# Patient Record
Sex: MALE
[De-identification: names, ages, dates, MRNs, and addresses within clinical notes are randomized per-mention and may not be internally consistent; named-entity substitution may affect disease eponyms.]

---

## 2019-07-05 ENCOUNTER — HOSPITAL ENCOUNTER (INPATIENT)
Dept: HOSPITAL 97 - ER | Age: 49
LOS: 2 days | Discharge: LEFT BEFORE DISCHARGE | DRG: 440 | End: 2019-07-07
Attending: HOSPITALIST | Admitting: INTERNAL MEDICINE
Payer: COMMERCIAL

## 2019-07-05 VITALS — BODY MASS INDEX: 30.3 KG/M2

## 2019-07-05 DIAGNOSIS — I25.10: ICD-10-CM

## 2019-07-05 DIAGNOSIS — K85.10: Primary | ICD-10-CM

## 2019-07-05 DIAGNOSIS — K70.40: ICD-10-CM

## 2019-07-05 DIAGNOSIS — I10: ICD-10-CM

## 2019-07-05 DIAGNOSIS — Z86.73: ICD-10-CM

## 2019-07-05 DIAGNOSIS — Z53.21: ICD-10-CM

## 2019-07-05 DIAGNOSIS — B19.20: ICD-10-CM

## 2019-07-05 LAB
ALBUMIN SERPL BCP-MCNC: 2.6 G/DL (ref 3.4–5)
ALP SERPL-CCNC: 122 U/L (ref 45–117)
ALT SERPL W P-5'-P-CCNC: 55 U/L (ref 12–78)
AST SERPL W P-5'-P-CCNC: 48 U/L (ref 15–37)
BUN BLD-MCNC: 15 MG/DL (ref 7–18)
GLUCOSE SERPLBLD-MCNC: 319 MG/DL (ref 74–106)
HCT VFR BLD CALC: 40.4 % (ref 39.6–49)
LIPASE SERPL-CCNC: 1533 U/L (ref 73–393)
LYMPHOCYTES # SPEC AUTO: 1 K/UL (ref 0.7–4.9)
METHAMPHET UR QL SCN: NEGATIVE
PMV BLD: 9.1 FL (ref 7.6–11.3)
POTASSIUM SERPL-SCNC: 4.1 MMOL/L (ref 3.5–5.1)
RBC # BLD: 4.52 M/UL (ref 4.33–5.43)
THC SERPL-MCNC: NEGATIVE NG/ML
UA DIPSTICK W REFLEX MICRO PNL UR: (no result)

## 2019-07-05 PROCEDURE — 85610 PROTHROMBIN TIME: CPT

## 2019-07-05 PROCEDURE — 84100 ASSAY OF PHOSPHORUS: CPT

## 2019-07-05 PROCEDURE — 93306 TTE W/DOPPLER COMPLETE: CPT

## 2019-07-05 PROCEDURE — 96374 THER/PROPH/DIAG INJ IV PUSH: CPT

## 2019-07-05 PROCEDURE — 87040 BLOOD CULTURE FOR BACTERIA: CPT

## 2019-07-05 PROCEDURE — 83735 ASSAY OF MAGNESIUM: CPT

## 2019-07-05 PROCEDURE — 83690 ASSAY OF LIPASE: CPT

## 2019-07-05 PROCEDURE — 84484 ASSAY OF TROPONIN QUANT: CPT

## 2019-07-05 PROCEDURE — 80076 HEPATIC FUNCTION PANEL: CPT

## 2019-07-05 PROCEDURE — 82140 ASSAY OF AMMONIA: CPT

## 2019-07-05 PROCEDURE — 82274 ASSAY TEST FOR BLOOD FECAL: CPT

## 2019-07-05 PROCEDURE — 80307 DRUG TEST PRSMV CHEM ANLYZR: CPT

## 2019-07-05 PROCEDURE — 85730 THROMBOPLASTIN TIME PARTIAL: CPT

## 2019-07-05 PROCEDURE — 36415 COLL VENOUS BLD VENIPUNCTURE: CPT

## 2019-07-05 PROCEDURE — 81003 URINALYSIS AUTO W/O SCOPE: CPT

## 2019-07-05 PROCEDURE — 85025 COMPLETE CBC W/AUTO DIFF WBC: CPT

## 2019-07-05 PROCEDURE — 80048 BASIC METABOLIC PNL TOTAL CA: CPT

## 2019-07-05 PROCEDURE — 80053 COMPREHEN METABOLIC PANEL: CPT

## 2019-07-05 PROCEDURE — 74177 CT ABD & PELVIS W/CONTRAST: CPT

## 2019-07-05 PROCEDURE — 99285 EMERGENCY DEPT VISIT HI MDM: CPT

## 2019-07-05 PROCEDURE — 93005 ELECTROCARDIOGRAM TRACING: CPT

## 2019-07-05 RX ADMIN — LACTULOSE SCH GM: 20 SOLUTION ORAL at 09:37

## 2019-07-05 RX ADMIN — LACTULOSE SCH GM: 20 SOLUTION ORAL at 12:19

## 2019-07-05 RX ADMIN — SODIUM CHLORIDE SCH: 0.9 INJECTION, SOLUTION INTRAVENOUS at 06:00

## 2019-07-05 RX ADMIN — Medication SCH ML: at 20:36

## 2019-07-05 RX ADMIN — Medication SCH ML: at 09:35

## 2019-07-05 RX ADMIN — SODIUM CHLORIDE SCH MG: 0.9 INJECTION, SOLUTION INTRAVENOUS at 09:35

## 2019-07-05 RX ADMIN — LACTULOSE SCH GM: 20 SOLUTION ORAL at 17:04

## 2019-07-05 RX ADMIN — MORPHINE SULFATE PRN MG: 2 INJECTION, SOLUTION INTRAMUSCULAR; INTRAVENOUS at 21:11

## 2019-07-05 RX ADMIN — MORPHINE SULFATE PRN MG: 2 INJECTION, SOLUTION INTRAMUSCULAR; INTRAVENOUS at 17:03

## 2019-07-05 RX ADMIN — Medication SCH MG: at 12:19

## 2019-07-05 RX ADMIN — LACTULOSE SCH GM: 20 SOLUTION ORAL at 22:39

## 2019-07-05 RX ADMIN — SODIUM CHLORIDE SCH MLS: 0.9 INJECTION, SOLUTION INTRAVENOUS at 21:12

## 2019-07-05 RX ADMIN — SODIUM CHLORIDE SCH MLS: 0.9 INJECTION, SOLUTION INTRAVENOUS at 12:25

## 2019-07-05 NOTE — P.PN
Subjective


Date of Service: 07/05/19


Chief Complaint: Abdominal pain





Patient is still complaining of abdominal pain admitted with presumed 

pancreatitis otherwise he appears to be fairly comfortable is lipase is very 

elevated patient has had some problems with his common bile duct status post 

stent placement history of hepatic encephalopathy





Review of Systems


is unable to be obtained





Physical Examination





- Vital Signs


Temperature: 97.3 F


Blood Pressure: 122/67


Pulse: 74


Respirations: 18


Pulse Ox (%): 98





- Physical Exam


General: Alert, In no apparent distress


Respiratory: Clear to auscultation bilaterally


Cardiovascular: No edema, Regular rate/rhythm


Gastrointestinal: Normal bowel sounds, Non-distended, Tenderness (Generalized 

tenderness)





- Studies


Laboratory Data (last 24 hrs)





07/05/19 04:08: Sodium 144, Potassium 4.1, BUN 15, Creatinine 0.69, Glucose 319 

H, Total Bilirubin 0.9, AST 48 H, ALT 55, Alkaline Phosphatase 122 H, Lipase 

1533 H


07/05/19 03:29: WBC 3.7 L, Hgb 13.8, Hct 40.4, Plt Count 106 L








Assessment & Plan





- Problems (Diagnosis)


(1) Pancreatitis


Current Visit: Yes   Status: Acute   


Plan: 


Patient is 49 years of age admitted with abdominal pain lipase is very elevated 

presumed pancreatitis all labs reviewed vital signs are all stable white count 

3.7 hemoglobin 13.8 renal function is normal.  Glucose elevated ammonia is also 

elevated presumed hepatic encephalopathy CT of the abdomen shows coli do 

cholelithiasis with mild been Re dilatation this is a stent in the common bile 

duct cirrhotic liver with splenomegaly status post TIPS


Qualifiers: 


   Chronicity: acute   Pancreatitis type: biliary 


Plan to discharge in: 48 Hours

## 2019-07-05 NOTE — ER
Nurse's Notes                                                                                     

 [facility]                                                                 

Name: Joe Archuleta                                                                              

Age: 49 yrs                                                                                       

Sex: Male                                                                                         

: 1970                                                                                   

MRN: [id number]                                                                                   

Arrival Date: 2019                                                                          

Time: 03:07                                                                                       

Account#: [id number]                                                                            

Bed 5                                                                                             

Private MD:                                                                                       

Diagnosis: Encephalopathy, unspecified;Acute pancreatitis                                         

                                                                                                  

Presentation:                                                                                     

                                                                                             

03:28 Presenting complaint: Patient states: confusion and abdominal pain x 2 days. Wife       aa1 

      states \E\she thinks his ammonia level might be elevated again. Transition of care:         

      patient was not received from another setting of care. Onset of symptoms was 2019. Risk Assessment: Do you want to hurt yourself or someone else? Patient reports no     

      desire to harm self or others. Initial Sepsis Screen: Does the patient meet any 2           

      criteria? No. Patient's initial sepsis screen is negative. Does the patient have a          

      suspected source of infection? No. Patient's initial sepsis screen is negative. Care        

      prior to arrival: None.                                                                     

03:28 Method Of Arrival: Wheelchair                                                           aa1 

03:28 Acuity: VALENT[place] 3                                                                           aa1 

                                                                                                  

Triage Assessment:                                                                                

03:30 General: Appears in no apparent distress. comfortable, Behavior is calm, cooperative,   aa1 

      appropriate for age.                                                                        

03:37 Pain: Complains of pain in headache. EENT: No signs and/or symptoms were reported       ak1 

      regarding the EENT system. Neuro: Level of Consciousness is awake, alert, obeys             

      commands, Oriented to person, place, time, situation,  are equal bilaterally Moves     

      all extremities. Gait is steady, Speech is normal, Facial symmetry appears normal.          

      Cardiovascular: No deficits noted. Respiratory: No deficits noted. GI: No signs and/or      

      symptoms were reported involving the gastrointestinal system. : No signs and/or           

      symptoms were reported regarding the genitourinary system. Derm: No signs and/or            

      symptoms reported regarding the dermatologic system. Musculoskeletal: No signs and/or       

      symptoms reported regarding the musculoskeletal system.                                     

                                                                                                  

Historical:                                                                                       

- Allergies:                                                                                      

03:30 No Known Allergies;                                                                     aa1 

- Home Meds:                                                                                      

03:30 lactulose 20 gram/30 mL Oral soln 60 mL 4 times per day for liver failure [Active];     aa1 

- PMHx:                                                                                           

03:30 Cirrhosis; CVA; esophageal varices; Hepatitis; Hyperlipidemia; Myocardial infarction;   aa1 

- PSHx:                                                                                           

03:30 Appendectomy;                                                                           aa1 

                                                                                                  

- Immunization history:: Flu vaccine is up to date.                                               

- Social history:: Smoking status: Patient/guardian denies using tobacco.                         

- Ebola Screening: : No symptoms or risks identified at this time.                                

                                                                                                  

                                                                                                  

Screenin:36 Abuse screen: Denies threats or abuse. Denies injuries from another. Nutritional        ak1 

      screening: No deficits noted. Tuberculosis screening: No symptoms or risk factors           

      identified. Fall Risk None identified.                                                      

                                                                                                  

Assessment:                                                                                       

03:33 Reassessment: Dr. Chamorro informed of pt request for pain medication for his headache, no ak1 

      new orders given at this time. Pt informed of need to wait per ERP.                         

03:39 Reassessment: Patient appears in no apparent distress at this time. No changes from     ak1 

      previously documented assessment. Patient and/or family updated on plan of care and         

      expected duration. Pain level reassessed. Patient is alert, oriented x 3, equal             

      unlabored respirations, skin warm/dry/pink. see triage assessment.                          

05:13 Reassessment: Patient appears in no apparent distress at this time. Patient and/or      ak1 

      family updated on plan of care and expected duration. Pain level reassessed. Patient is     

      alert, oriented x 3, equal unlabored respirations, skin warm/dry/pink. pt with steady       

      gait to ER restroom. pt and family informed of admission status and wait for hospital       

      room. Patient states feeling better. Patient states symptoms have improved.                 

05:32 Reassessment: report called to nurse Ellie for 431..                                     ea  

                                                                                                  

Vital Signs:                                                                                      

03:30  / 73; Pulse 74; Resp 20; Temp 98.1; Pulse Ox 97% on R/A; Weight 104.33 kg;       aa1 

      Height 6 ft. 1 in. (185.42 cm); Pain 10/10;                                                 

03:33  / 73; Pulse 69; Resp 16; Pulse Ox 97% on R/A;                                    ak1 

04:03  / 66; Pulse 70; Resp 16; Pulse Ox 96% on R/A;                                    ak1 

05:14  / 79; Pulse 72; Resp 16; Temp 98.2; Pulse Ox 96% on R/A; Pain 4/10;              ak1 

03:30 Body Mass Index 30.34 (104.33 kg, 185.42 cm)                                            aa1 

                                                                                                  

ED Course:                                                                                        

03:07 Patient arrived in ED.                                                                  ag3 

03:13 Andres Chamorro MD is Attending Physician.                                              gs  

03:19 Agueda Daniels, RN is Primary Nurse.                                                     ak1 

03:29 Triage completed.                                                                       aa1 

03:30 Arm band placed on right wrist.                                                         aa1 

03:33 Initial lab(s) drawn, by me, sent to lab. Inserted saline lock: 22 gauge in right hand, ak1 

      using aseptic technique. Blood collected.                                                   

03:36 Patient has correct armband on for positive identification. Placed in gown. Bed in low  ak1 

      position. Call light in reach. Side rails up X2. Pulse ox on. NIBP on.                      

04:56 Los Baron MD is Hospitalizing Provider.                                           gs  

05:13 No provider procedures requiring assistance completed. Patient admitted, IV remains in  ak1 

      place.                                                                                      

05:41 Inserted saline lock: 22 gauge in right antecubital area, using aseptic technique.      ea  

                                                                                                  

Administered Medications:                                                                         

05:24 Drug: NS 0.9% 1000 ml Route: IV; Rate: 125 ml/hr; Site: right hand;                     ea  

05:28 Follow up: IV Status: Infusion continued upon admission                                 ea  

05:24 Drug: fentaNYL (PF) 50 mcg Route: IVP; Site: right hand;                                ea  

05:28 Follow up: Response: No adverse reaction                                                ea  

                                                                                                  

                                                                                                  

Outcome:                                                                                          

04:57 Decision to Hospitalize by Provider.                                                    gs  

05:27 Admitted to Med/surg accompanied by tech, via wheelchair, room 431, with chart.         ea  

05:27 Condition: good                                                                             

05:27 Instructed on the need for admit.                                                           

06:10 Patient left the ED.                                                                    ea  

                                                                                                  

Signatures:                                                                                       

Siena Gan RN RN   aa1                                                  

Agueda Daniels RN RN   [facility]                                                  

Alma Rosa Zamora RN RN ea Starr, Gregory, MD MD                                                      

Chelly Bermeo                                 3                                                  

                                                                                                  

**************************************************************************************************

## 2019-07-05 NOTE — EDPHYS
Physician Documentation                                                                           

 Formerly [facility]                                                                 

Name: Joe Archuleta                                                                              

Age: 49 yrs                                                                                       

Sex: Male                                                                                         

: 1970                                                                                   

MRN: [id number]                                                                                   

Arrival Date: 2019                                                                          

Time: 03:07                                                                                       

Account#: [id number]                                                                            

Bed 5                                                                                             

Private MD:                                                                                       

ED Physician Andres Chamorro                                                                       

HPI:                                                                                              

                                                                                             

04:54 This 49 yrs old  Male presents to ER via Wheelchair with complaints of Altered  gs  

      Mental Status.                                                                              

04:54 The patient presents with confusion. Onset: The symptoms/episode began/occurred 5       gs  

      day(s) ago, and became worse and became persistent. Possible causes: liver. Associated      

      signs and symptoms: Pertinent negatives: vomiting. Current symptoms: In the emergency       

      department the patient's symptoms are unchanged from the initial presentation. The          

      patient has experienced similar episodes in the past, a few times. The patient has not      

      recently seen a physician.                                                                  

                                                                                                  

Historical:                                                                                       

- Allergies:                                                                                      

03:30 No Known Allergies;                                                                     aa1 

- Home Meds:                                                                                      

03:30 lactulose 20 gram/30 mL Oral soln 60 mL 4 times per day for liver failure [Active];     aa1 

- PMHx:                                                                                           

03:30 Cirrhosis; CVA; esophageal varices; Hepatitis; Hyperlipidemia; Myocardial infarction;   aa1 

- PSHx:                                                                                           

03:30 Appendectomy;                                                                           aa1 

                                                                                                  

- Immunization history:: Flu vaccine is up to date.                                               

- Social history:: Smoking status: Patient/guardian denies using tobacco.                         

- Ebola Screening: : No symptoms or risks identified at this time.                                

                                                                                                  

                                                                                                  

ROS:                                                                                              

04:54 All other systems are negative.                                                         gs  

                                                                                                  

Exam:                                                                                             

04:54 Head/Face:  Normocephalic, atraumatic. Eyes:  Pupils equal round and reactive to light, gs  

      extra-ocular motions intact.  Lids and lashes normal.  Conjunctiva and sclera are           

      non-icteric and not injected.  Cornea within normal limits.  Periorbital areas with no      

      swelling, redness, or edema. ENT:  Nares patent. No nasal discharge, no septal              

      abnormalities noted.  Tympanic membranes are normal and external auditory canals are        

      clear.  Oropharynx with no redness, swelling, or masses, exudates, or evidence of           

      obstruction, uvula midline.  Mucous membranes moist. Neck:  Trachea midline, no             

      thyromegaly or masses palpated, and no cervical lymphadenopathy.  Supple, full range of     

      motion without nuchal rigidity, or vertebral point tenderness.  No Meningismus.             

      Chest/axilla:  Normal chest wall appearance and motion.  Nontender with no deformity.       

      No lesions are appreciated. Cardiovascular:  Regular rate and rhythm with a normal S1       

      and S2.  No gallops, murmurs, or rubs.  Normal PMI, no JVD.  No pulse deficits.             

      Respiratory:  Lungs have equal breath sounds bilaterally, clear to auscultation and         

      percussion.  No rales, rhonchi or wheezes noted.  No increased work of breathing, no        

      retractions or nasal flaring. Abdomen/GI:  Soft, non-tender, with normal bowel sounds.      

      No distension or tympany.  No guarding or rebound.  No evidence of tenderness               

      throughout. Back:  No spinal tenderness.  No costovertebral tenderness.  Full range of      

      motion. Skin:  Warm, dry with normal turgor.  Normal color with no rashes, no lesions,      

      and no evidence of cellulitis. MS/ Extremity:  Pulses equal, no cyanosis.                   

      Neurovascular intact.  Full, normal range of motion. Neuro:  Awake and alert, GCS 15,       

      oriented to person, place, time, and situation.  Cranial nerves II-XII grossly intact.      

      Motor strength 5/5 in all extremities.  Sensory grossly intact.  Cerebellar exam            

      normal.  Normal gait.                                                                       

04:54 Constitutional: The patient appears alert, awake.                                           

                                                                                                  

Vital Signs:                                                                                      

03:30  / 73; Pulse 74; Resp 20; Temp 98.1; Pulse Ox 97% on R/A; Weight 104.33 kg;       aa1 

      Height 6 ft. 1 in. (185.42 cm); Pain 10/10;                                                 

03:33  / 73; Pulse 69; Resp 16; Pulse Ox 97% on R/A;                                    ak1 

04:03  / 66; Pulse 70; Resp 16; Pulse Ox 96% on R/A;                                    ak1 

05:14  / 79; Pulse 72; Resp 16; Temp 98.2; Pulse Ox 96% on R/A; Pain 4/10;              ak1 

03:30 Body Mass Index 30.34 (104.33 kg, 185.42 cm)                                            aa1 

                                                                                                  

MDM:                                                                                              

03:17 Patient medically screened.                                                             gs  

04:54 Differential Diagnosis: electrolyte abnormality, alcohol intoxication, volume           gs  

      depletion, ENCEPHALOPATHY. Data reviewed: vital signs, nurses notes, lab test               

      result(s), radiologic studies. Counseling: I had a detailed discussion with the patient     

      and/or guardian regarding: the historical points, exam findings, and any diagnostic         

      results supporting the discharge/admit diagnosis, the need to transfer to another           

      facility. Response to treatment: the patient's symptoms have mildly improved after          

      treatment.                                                                                  

                                                                                                  

                                                                                             

03:18 Order name: Basic Metabolic Panel                                                       gs  

                                                                                             

03:18 Order name: CBC with Diff                                                               gs  

                                                                                             

03:18 Order name: Hepatic Function; Complete Time: 04:52                                      gs  

                                                                                             

03:18 Order name: Lipase; Complete Time: 04:52                                                gs  

                                                                                             

03:18 Order name: AMMONIA; Complete Time: 04:34                                               gs  

                                                                                             

03:18 Order name: Basic Metabolic Panel; Complete Time: 04:52                                 EDMS

                                                                                             

03:18 Order name: IV Saline Lock; Complete Time: 03:32                                        gs  

                                                                                             

03:18 Order name: Labs collected and sent; Complete Time: 03:32                               gs  

                                                                                             

03:18 Order name: CBC with Automated Diff; Complete Time: 04:34                               EDMS

                                                                                             

05:15 Order name: Abdomen                                                                     EDMS

                                                                                                  

Administered Medications:                                                                         

05:24 Drug: NS 0.9% 1000 ml Route: IV; Rate: 125 ml/hr; Site: right hand;                     ea  

05:28 Follow up: IV Status: Infusion continued upon admission                                 ea  

05:24 Drug: fentaNYL (PF) 50 mcg Route: IVP; Site: right hand;                                ea  

05:28 Follow up: Response: No adverse reaction                                                ea  

                                                                                                  

                                                                                                  

Disposition:                                                                                      

19 04:57 Hospitalization ordered by [facility] for Inpatient Admission. Preliminary     

  diagnosis are Encephalopathy, unspecified, Acute pancreatitis.                                  

- Bed requested for Telemetry/MedSurg (Inpatient).                                                

- Status is Inpatient Admission.                                                              ea  

- Condition is Stable.                                                                            

- Problem is an acute exacerbation.                                                               

- Symptoms have improved.                                                                         

UTI on Admission? No                                                                              

                                                                                                  

                                                                                                  

                                                                                                  

Signatures:                                                                                       

Dispatcher [employer]                           EDSiena Salinas RN                  RN   aa1                                                  

Agueda Daniels RN                       RN   ak1                                                  

Ruthann Lynch RN RN cg Antunez, Elena, RN RN ea Starr, Gregory, MD MD gs                                                   

                                                                                                  

Corrections: (The following items were deleted from the chart)                                    

05:24 04:57 Hospitalization Ordered by [facility] MD for Inpatient Admission. Preliminary  cg  

      diagnosis is Encephalopathy, unspecified; Acute pancreatitis. Bed requested for             

      Telemetry/MedSurg (Inpatient). Status is Inpatient Admission. Condition is Stable.          

      Problem is an acute exacerbation. Symptoms have improved. UTI on Admission? No. gs          

06:10 05:24 2019 04:57 Hospitalization Ordered by [facility] MD for Inpatient        ea  

      Admission. Preliminary diagnosis is Encephalopathy, unspecified; Acute pancreatitis.        

      Bed requested for Telemetry/MedSurg (Inpatient). Status is Inpatient Admission.             

      Condition is Stable. Problem is an acute exacerbation. Symptoms have improved. UTI on       

      Admission? No. cg                                                                           

                                                                                                  

**************************************************************************************************

## 2019-07-05 NOTE — HP
Date of Admission:  07/05/2019



Reason For Admission:  

1.   Acute pancreatitis.

2.   Hepatitis C and alcoholic cirrhosis.

3.   Hepatic encephalopathy.



History Of Present Illness:  This is a 49-year-old gentleman who used to come 
to the hospital quite frequently a couple of years ago for multiple admissions 
for hepatic encephalopathy.  He had moved back to [place] and [place].  He 
has been on [place] Medicaid and just recently received disability.  At that 
time, he said they decided to move back here.  He had been out drinking for the 
4th holiday.  He states he only drank 2 beers and shortly thereafter he started 
having abdominal pain.  His abdominal discomfort continued to worsen.  He came 
into the ER for evaluation.  His workup revealed an elevated lipase level.  He 
also had elevated ammonia level.  The patient's ammonia was 132 and the patient'
s lipase was 1533.  Coags have not been done.  Albumin level was 3.6.  Total 
bilirubin is 0.9.  The patient was admitted to the hospital for further 
evaluation.  The patient will need inpatient stay for hepatic encephalopathy 
and acute pancreatitis.



Review of Systems:

Ten-point review of systems is otherwise unremarkable.



Past Medical History:  Hepatitis C, liver cirrhosis, hypertension coronary 
artery disease, prior stroke, treatment of TB many years ago, anxiety disorder.



Past Surgical History:  Appendectomy, cholecystectomy, he has had hernia 
repair.  He has had coronary artery stent placement and colonoscopy.



Allergies:  NO KNOWN DRUG ALLERGIES.



Home Medications:  Have been reviewed and are in the chart.



Family History:  Noncontributory.



Social History:  He still drinks.  Smokes occasionally.  Denies any other drug 
use.



Physical Examination:

Vital Signs:  His initial vitals revealed a temp of 99.6, heart rate was 90, 
respirations 16, blood pressure was 130/70, patient was saturating 97% on room 
air. 

General:  The patient is lying in bed.  He looks comfortable, in no distress.  
He is having some abdominal discomfort.  He is awake, alert, oriented to person 
and to place but not to time. 

HEENT:  Normocephalic, atraumatic.  Pupils are equal and reactive.  Extraocular 
muscles are intact.  There is no JVP.  No bruits.  No thyromegaly.  TMs normal. 

Cardiovascular:  Regular rate and rhythm.  No murmur. 

Lungs:  Clear bilaterally. 

Abdomen:  Soft, nontender, nondistended.  Bowel sounds positive. 

Extremities:  No clubbing, no cyanosis, no edema. 

Neurologic:  Cranial nerves 2 through 12 are intact.  Motor is 5/5.  Sensation 
intact to light touch. 

Skin:  No deformities.



Laboratory Data:  Labs have been reviewed.



Assessment:  

1.   Hepatitis C and alcoholic liver cirrhosis, now with hepatic encephalopathy.

2.   The patient with acute pancreatitis.

3.   The patient with a history of coronary artery disease.

4.   The patient with a history of prior cerebrovascular accident.

5.   The patient with history of hypertension.



Plan:  

1.   Plan at this time is to continue with IV hydration and pain control.

2.   Continue lactulose.

3.   Continue rifaximin with lactulose.

4.   N.p.o. and IV hydration.

5.   We will get a dedicated CT scan of the abdomen with IV contrast.

6.   Monitor liver function testing and get coagulation profile.

7.   We will hydrate the patient, but he has a questionable history of CHF and 
he states he has been told his heart is weak.  We had an echocardiogram done on 
him in the past, but the last one was done, there was none on the computer, so 
we will go ahead and get one repeated so that can guide us on how aggressively 
to hydrate him for his pancreatitis especially if his symptoms get worse.

8.   Continue GI prophylaxis with Protonix and DVT prophylaxis with Lovenox.  
He does have some thrombocytopenia, so we will monitor this closely.





ARMIN

DD:  07/05/2019 05:55:49   Voice ID:  [id number]

MTDD

## 2019-07-05 NOTE — RAD REPORT
EXAM DESCRIPTION:  CT - Abdomen   Pelvis W Contrast - 7/5/2019 6:43 am

 

CLINICAL HISTORY:  The patient is 49 years old and is Male; pancreatitis abdominal pain x2 days

 

TECHNIQUE:  Axial computed tomography images of the abdomen and pelvis with intravenous contrast.   S
agittal and coronal reformatted images were created and reviewed.   This CT exam was performed using 
one or more of the following dose reduction techniques:   automated exposure control, adjustment of t
he mA and/or kV according to patient size, and/or use of iterative reconstruction technique.

 

COMPARISON:  CT of the abdomen and pelvis June 22, 2016.

 

FINDINGS:  LUNG BASES:   Unremarkable.   No mass.   No consolidation.

ABDOMEN:

   LIVER:   The liver has a nodular contour.

   GALLBLADDER AND BILE DUCTS:   A 0.5 cm gallstone is present within the common bile duct. This is u
nchanged from prior exam.   Surgical clips are present in the right upper quadrant, consistent with p
revious cholecystectomy.   Mild prominence of the common bile duct is present measuring 0.9 cm.

   PANCREAS:   No ductal dilation.   No mass.

   SPLEEN:   The spleen is enlarged.

   ADRENALS:   Unremarkable.   No mass.

   KIDNEYS AND URETERS:   Unremarkable.   No solid mass.   No hydronephrosis.

   STOMACH AND BOWEL:   The stomach is minimally distended with food contents. The small bowel is nor
mal in caliber. Stool is present throughout the colon. There is no mucosal thickening or evidence of 
bowel obstruction.

PELVIS:

   APPENDIX:   No findings to suggest acute appendicitis.

   BLADDER:   The bladder is not well distended.

   REPRODUCTIVE:   Unremarkable as visualized.

ABDOMEN and PELVIS:

   INTRAPERITONEAL SPACE:   Unremarkable.   No free air.   No significant fluid collection.

   BONES/JOINTS:   No acute fracture.

   SOFT TISSUES:   A moderate-sized fat-containing ventral wall hernia is present.

   VASCULATURE:   Multiple vascular coils are present within the mid upper abdomen   Multiple enlarge
d collateral vessels are present within the left upper quadrant.

   LYMPH NODES:   Unremarkable. No enlarged lymph nodes.

   OTHER FINDINGS:   A TIPS is in place.

 

IMPRESSION:  1.   Choledocholithiasis with mild biliary dilatation. The stent within the common bile 
duct near the ampulla was present on prior exam, June 22, 2016. However, there has been slight interv
al increase in the biliary dilatation.

2.   Cirrhotic liver, splenomegaly, and TIPS.

3.   Chronic findings as detailed above.

 

Electronically signed by:   Lynda Perrin MD   7/5/2019 6:34 AM CDT Workstation: [id number]

 

 

Due to temporary technical issues with the PACS/Fluency reporting system, reports are being signed by
 the in house radiologist as a courtesy to ensure prompt reporting. The interpreting radiologist is f
ully responsible for the content of the report.

## 2019-07-06 LAB
ALBUMIN SERPL BCP-MCNC: 2.4 G/DL (ref 3.4–5)
ALP SERPL-CCNC: 111 U/L (ref 45–117)
ALT SERPL W P-5'-P-CCNC: 51 U/L (ref 12–78)
AST SERPL W P-5'-P-CCNC: 51 U/L (ref 15–37)
BLD SMEAR INTERP: (no result)
BUN BLD-MCNC: 11 MG/DL (ref 7–18)
GLUCOSE SERPLBLD-MCNC: 164 MG/DL (ref 74–106)
HCT VFR BLD CALC: 37.8 % (ref 39.6–49)
INR BLD: 1.19
LIPASE SERPL-CCNC: 542 U/L (ref 73–393)
LYMPHOCYTES # SPEC AUTO: 0.9 K/UL (ref 0.7–4.9)
MAGNESIUM SERPL-MCNC: 1.7 MG/DL (ref 1.8–2.4)
MORPHOLOGY BLD-IMP: (no result)
PMV BLD: 9.5 FL (ref 7.6–11.3)
POTASSIUM SERPL-SCNC: 3.9 MMOL/L (ref 3.5–5.1)
RBC # BLD: 4.3 M/UL (ref 4.33–5.43)

## 2019-07-06 RX ADMIN — Medication SCH MG: at 08:41

## 2019-07-06 RX ADMIN — MORPHINE SULFATE PRN MG: 2 INJECTION, SOLUTION INTRAMUSCULAR; INTRAVENOUS at 20:47

## 2019-07-06 RX ADMIN — LACTULOSE SCH GM: 20 SOLUTION ORAL at 18:11

## 2019-07-06 RX ADMIN — SODIUM CHLORIDE SCH MG: 0.9 INJECTION, SOLUTION INTRAVENOUS at 08:40

## 2019-07-06 RX ADMIN — SODIUM CHLORIDE SCH MLS: 0.9 INJECTION, SOLUTION INTRAVENOUS at 15:53

## 2019-07-06 RX ADMIN — LACTULOSE SCH GM: 20 SOLUTION ORAL at 11:44

## 2019-07-06 RX ADMIN — LACTULOSE SCH GM: 20 SOLUTION ORAL at 10:34

## 2019-07-06 RX ADMIN — Medication SCH ML: at 20:39

## 2019-07-06 RX ADMIN — Medication SCH ML: at 08:42

## 2019-07-06 RX ADMIN — LACTULOSE SCH GM: 20 SOLUTION ORAL at 05:38

## 2019-07-06 RX ADMIN — MORPHINE SULFATE PRN MG: 2 INJECTION, SOLUTION INTRAMUSCULAR; INTRAVENOUS at 08:41

## 2019-07-06 RX ADMIN — MORPHINE SULFATE PRN MG: 2 INJECTION, SOLUTION INTRAMUSCULAR; INTRAVENOUS at 05:37

## 2019-07-06 RX ADMIN — MORPHINE SULFATE PRN MG: 2 INJECTION, SOLUTION INTRAMUSCULAR; INTRAVENOUS at 14:48

## 2019-07-06 RX ADMIN — SODIUM CHLORIDE SCH MLS: 0.9 INJECTION, SOLUTION INTRAVENOUS at 05:38

## 2019-07-06 NOTE — P.PN
Subjective


Date of Service: 07/06/19


Chief Complaint: Pancreatitis


Patient is still complaining of abdominal pain although is improved since 

yesterday patient has been spontaneously eating and drinking and he went 

outside of the hospital was walking around in the parking lot his wife 

mentioned that he needed to go out and walk still feels a bit nauseated





Review of Systems


General: Weakness


Gastrointestinal: Nausea, Abdominal Pain





Physical Examination





- Vital Signs


Temperature: 97.1 F


Blood Pressure: 132/71


Pulse: 58


Respirations: 15


Pulse Ox (%): 96





- Physical Exam


General: Alert, Oriented x3


Neck: Supple


Respiratory: Clear to auscultation bilaterally


Cardiovascular: No edema, Normal S1 S2


Gastrointestinal: Normal bowel sounds, No rebound, No guarding, Other, Masses (

Mild abdominal tenderness)





Assessment & Plan





- Problems (Diagnosis)


(1) Pancreatitis


Current Visit: Yes   Status: Acute   


Plan: 


Patient admitted with pancreatitis he is improving lipase is declining patient 

to start on a liquid diet labs reviewed ammonia level is still high vital signs 

stable


Qualifiers: 


   Chronicity: acute   Pancreatitis type: biliary 


Plan to discharge in: 48 Hours

## 2019-07-07 VITALS — SYSTOLIC BLOOD PRESSURE: 131 MMHG | TEMPERATURE: 97.9 F | DIASTOLIC BLOOD PRESSURE: 72 MMHG

## 2019-07-07 VITALS — OXYGEN SATURATION: 97 %

## 2019-07-07 LAB
BUN BLD-MCNC: 7 MG/DL (ref 7–18)
GLUCOSE SERPLBLD-MCNC: 129 MG/DL (ref 74–106)
LIPASE SERPL-CCNC: 210 U/L (ref 73–393)
MAGNESIUM SERPL-MCNC: 1.8 MG/DL (ref 1.8–2.4)
POTASSIUM SERPL-SCNC: 3.7 MMOL/L (ref 3.5–5.1)

## 2019-07-07 RX ADMIN — LACTULOSE SCH GM: 20 SOLUTION ORAL at 04:47

## 2019-07-07 RX ADMIN — SODIUM CHLORIDE SCH MG: 0.9 INJECTION, SOLUTION INTRAVENOUS at 08:00

## 2019-07-07 RX ADMIN — LACTULOSE SCH: 20 SOLUTION ORAL at 11:00

## 2019-07-07 RX ADMIN — SODIUM CHLORIDE SCH MLS: 0.9 INJECTION, SOLUTION INTRAVENOUS at 04:48

## 2019-07-07 RX ADMIN — Medication SCH MG: at 07:59

## 2019-07-07 RX ADMIN — Medication SCH ML: at 08:01

## 2019-07-07 RX ADMIN — MORPHINE SULFATE PRN MG: 2 INJECTION, SOLUTION INTRAMUSCULAR; INTRAVENOUS at 04:47

## 2019-07-07 NOTE — P.DS
Admission Date: 07/05/19


Discharge Date: 07/07/19


Disposition: AMA-LEFT AGAINST MEDICAL ADVIC


Discharge Condition: GOOD


Reason for Admission: Pancreatitis





- Problems


(1) Pancreatitis


Current Visit: Yes   Status: Acute   


Qualifiers: 


   Chronicity: acute   Pancreatitis type: biliary 


Brief History of Present Illness: 





Patient admitted with abdominal pain was found to have pancreatitis


Hospital Course: 





This morning he felt well although he has been NPO patient left AMA without 

advancing his diet on examination this morning he was alert oriented responsive 

cooperative no obvious abdominal tenderness no rebound or guarding bowel sounds 

positive this to follow up with his primary care physician


Vital Signs/Physical Exam: 














Temp Pulse Resp BP Pulse Ox


 


 97.9 F   59   18   131/72   97 


 


 07/07/19 10:05  07/07/19 10:05  07/07/19 10:05  07/07/19 10:05  07/07/19 10:05








Laboratory Data at Discharge: 














WBC  3.4 K/uL (4.3-10.9)  L  07/06/19  05:22    


 


Hgb  13.3 g/dL (13.6-17.9)  L  07/06/19  05:22    


 


Hct  37.8 % (39.6-49.0)  L  07/06/19  05:22    


 


Plt Count  99 K/uL (152-406)  L  07/06/19  05:22    


 


PT  13.9 SECONDS (9.5-12.5)  H  07/06/19  05:22    


 


INR  1.19   07/06/19  05:22    


 


APTT  40.9 SECONDS (24.3-36.9)  H  07/06/19  05:22    


 


Sodium  143 mmol/L (136-145)   07/07/19  06:12    


 


Potassium  3.7 mmol/L (3.5-5.1)   07/07/19  06:12    


 


BUN  7 mg/dL (7-18)   07/07/19  06:12    


 


Creatinine  0.63 mg/dL (0.55-1.3)   07/07/19  06:12    


 


Glucose  129 mg/dL ()  H  07/07/19  06:12    


 


Phosphorus  2.9 mg/dL (2.5-4.9)   07/06/19  05:22    


 


Magnesium  1.8 mg/dL (1.8-2.4)   07/07/19  06:12    


 


Total Bilirubin  1.2 mg/dL (0.2-1.0)  H  07/06/19  05:22    


 


AST  51 U/L (15-37)  H  07/06/19  05:22    


 


ALT  51 U/L (12-78)   07/06/19  05:22    


 


Alkaline Phosphatase  111 U/L ()   07/06/19  05:22    


 


Troponin I  < 0.02 ng/mL (0.0-0.045)   07/06/19  07:36    


 


Lipase  210 U/L ()   07/07/19  06:12    








Home Medications: 








Lactulose [Cephulac*] 20 gm PO QID 06/30/16

## 2019-07-07 NOTE — P.PN
Subjective


Date of Service: 07/07/19


Chief Complaint: Pancreatitis





Patient has improved significantly his abdominal pain has decreased plan to 

advance his diet lipase is decreased





Review of Systems


Unremarkable





Physical Examination





- Vital Signs


Temperature: 97.9 F


Blood Pressure: 131/72


Pulse: 59


Respirations: 18


Pulse Ox (%): 97





- Physical Exam


General: Alert, In no apparent distress, Oriented x3


Respiratory: Clear to auscultation bilaterally


Gastrointestinal: Normal bowel sounds, Soft and benign, No tenderness





Assessment & Plan





- Problems (Diagnosis)


(1) Pancreatitis


Current Visit: Yes   Status: Acute   


Plan: 


Patient admitted with pancreatitis is currently doing much better blood 

cultures are all negative no evidence of sepsis advance diet possible discharge 

tomorrow


Qualifiers: 


   Chronicity: acute   Pancreatitis type: biliary 


Discharge Plan: Home


Plan to discharge in: 24 Hours

## 2019-07-07 NOTE — EKG
Test Date:    2019-07-06               Test Time:    07:21:11

Technician:   JAY                                   

                                                     

MEASUREMENT RESULTS:                                       

Intervals:                                           

Rate:         59                                     

FL:           124                                    

QRSD:         98                                     

QT:           484                                    

QTc:          479                                    

Axis:                                                

P:            12                                     

FL:           124                                    

QRS:          81                                     

T:            68                                     

                                                     

INTERPRETIVE STATEMENTS:                                       

                                                     

Sinus bradycardia

Possible Right ventricular hypertrophy

Abnormal ECG

Compared to ECG 07/24/2016 00:21:28

Sinus rhythm no longer present



Electronically Signed On 07-07-19 08:42:36 CDT by Isiah Cook

## 2019-07-08 NOTE — ECHO
HEIGHT: 6 ft 1 in   WEIGHT: 230 lb 0 oz   DATE OF STUDY: 07/05/2019   REFER DR: Los Baron MD

2-DIMENSIONAL: YES

     M.MODE: YES

 DOPPLER: YES

COLOR FLOW: YES



                    TDS:  NO

PORTABLE: NO

 DEFINITY:  NO

BUBBLE STUDY: NO





DIAGNOSIS:  CONGESTIVE HEART FAILURE



CARDIAC HISTORY:  

CATHERIZATION: NO

SURGERY: NO

PROSTHETIC VALVE: NO

PACEMAKER: NO





MEASUREMENTS (cm)

    DIASTOLIC (NORMALS)                 SYSTOLIC (NORMALS)

IVSd                 1.1 (0.6-1.2)                    LA Diam       (1.9-4.0)     LVEF     
    53%  

LVIDd               4.5 (3.5-5.7)                        LVIDs      3.2 (2.0-3.5)     %FS  
        27%

LVPWd             1.2 (0.6-1.2)

Ao Diam           2.6 (2.0-3.7)



2 DIMENSIONAL ASSESSMENT:

RIGHT ATRIUM:                   NORMAL

LEFT ATRIUM:       NORMAL



RIGHT VENTRICLE:            NORMAL

LEFT VENTRICLE: NORMAL



TRICUSPID VALVE:             NORMAL

MITRAL VALVE:     NORMAL



PULMONIC VALVE:             NORMAL

AORTIC VALVE:     SCLEROSIS



PERICARDIAL EFFUSION: NONE

AORTIC ROOT:      NORMAL





LEFT VENTRICULAR WALL MOTION:     NORMAL



DOPPLER/COLOR FLOW:     NORMAL



COMMENTS:      AORTIC SCLEROSIS WITH NO STENOSIS. NORMAL LEFT VENTRICULAR SIZE AND 
FUNCTION. NO WALL MOTION ABNORMALITY. NO EFFUSION.



TECHNOLOGIST:   AMANDA BRANTLEY

## 2019-09-02 ENCOUNTER — HOSPITAL ENCOUNTER (EMERGENCY)
Dept: HOSPITAL 97 - ER | Age: 49
LOS: 1 days | Discharge: ANOTHER HOSPITAL | End: 2019-09-03
Payer: COMMERCIAL

## 2019-09-02 VITALS — SYSTOLIC BLOOD PRESSURE: 82 MMHG | DIASTOLIC BLOOD PRESSURE: 29 MMHG | OXYGEN SATURATION: 98 %

## 2019-09-02 DIAGNOSIS — S82.202A: ICD-10-CM

## 2019-09-02 DIAGNOSIS — S82.402A: ICD-10-CM

## 2019-09-02 DIAGNOSIS — S42.009A: ICD-10-CM

## 2019-09-02 DIAGNOSIS — S22.21XA: ICD-10-CM

## 2019-09-02 DIAGNOSIS — S06.6X0A: Primary | ICD-10-CM

## 2019-09-02 DIAGNOSIS — E78.5: ICD-10-CM

## 2019-09-02 DIAGNOSIS — I25.2: ICD-10-CM

## 2019-09-02 DIAGNOSIS — S32.82XA: ICD-10-CM

## 2019-09-02 DIAGNOSIS — Z72.0: ICD-10-CM

## 2019-09-02 DIAGNOSIS — S42.109A: ICD-10-CM

## 2019-09-02 DIAGNOSIS — S32.059A: ICD-10-CM

## 2019-09-02 DIAGNOSIS — S32.049A: ICD-10-CM
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
## 2019-09-02 DIAGNOSIS — E11.9: ICD-10-CM

## 2019-09-02 DIAGNOSIS — S02.2XXA: ICD-10-CM

## 2019-09-02 DIAGNOSIS — S72.91XA: ICD-10-CM

## 2019-09-02 DIAGNOSIS — S02.609A: ICD-10-CM

## 2019-09-02 DIAGNOSIS — F10.20: ICD-10-CM

## 2019-09-02 LAB
BUN BLD-MCNC: 11 MG/DL (ref 7–18)
GLUCOSE SERPLBLD-MCNC: 425 MG/DL (ref 74–106)
HCT VFR BLD CALC: 43.9 % (ref 39.6–49)
LYMPHOCYTES # SPEC AUTO: 5.6 K/UL (ref 0.7–4.9)
MORPHOLOGY BLD-IMP: (no result)
PMV BLD: 9.5 FL (ref 7.6–11.3)
POTASSIUM SERPL-SCNC: 4 MMOL/L (ref 3.5–5.1)
RBC # BLD: 4.71 M/UL (ref 4.33–5.43)

## 2019-09-02 PROCEDURE — 70450 CT HEAD/BRAIN W/O DYE: CPT

## 2019-09-02 PROCEDURE — 36430 TRANSFUSION BLD/BLD COMPNT: CPT

## 2019-09-02 PROCEDURE — 29515 APPLICATION SHORT LEG SPLINT: CPT

## 2019-09-02 PROCEDURE — 73552 X-RAY EXAM OF FEMUR 2/>: CPT

## 2019-09-02 PROCEDURE — 86901 BLOOD TYPING SEROLOGIC RH(D): CPT

## 2019-09-02 PROCEDURE — 96375 TX/PRO/DX INJ NEW DRUG ADDON: CPT

## 2019-09-02 PROCEDURE — 74177 CT ABD & PELVIS W/CONTRAST: CPT

## 2019-09-02 PROCEDURE — 71045 X-RAY EXAM CHEST 1 VIEW: CPT

## 2019-09-02 PROCEDURE — 36415 COLL VENOUS BLD VENIPUNCTURE: CPT

## 2019-09-02 PROCEDURE — 86850 RBC ANTIBODY SCREEN: CPT

## 2019-09-02 PROCEDURE — 86900 BLOOD TYPING SEROLOGIC ABO: CPT

## 2019-09-02 PROCEDURE — 51702 INSERT TEMP BLADDER CATH: CPT

## 2019-09-02 PROCEDURE — 31500 INSERT EMERGENCY AIRWAY: CPT

## 2019-09-02 PROCEDURE — 80048 BASIC METABOLIC PNL TOTAL CA: CPT

## 2019-09-02 PROCEDURE — 73590 X-RAY EXAM OF LOWER LEG: CPT

## 2019-09-02 PROCEDURE — 85025 COMPLETE CBC W/AUTO DIFF WBC: CPT

## 2019-09-02 PROCEDURE — 96374 THER/PROPH/DIAG INJ IV PUSH: CPT

## 2019-09-02 PROCEDURE — 72125 CT NECK SPINE W/O DYE: CPT

## 2019-09-02 PROCEDURE — 94002 VENT MGMT INPAT INIT DAY: CPT

## 2019-09-02 PROCEDURE — 73110 X-RAY EXAM OF WRIST: CPT

## 2019-09-02 PROCEDURE — 96361 HYDRATE IV INFUSION ADD-ON: CPT

## 2019-09-02 PROCEDURE — 99291 CRITICAL CARE FIRST HOUR: CPT

## 2019-09-02 PROCEDURE — 71260 CT THORAX DX C+: CPT

## 2019-09-02 PROCEDURE — 72170 X-RAY EXAM OF PELVIS: CPT

## 2019-09-02 NOTE — XMS REPORT
Patient Summary Document

 Created on:2019



Patient:TARIK BARRON

Sex:Male

:1970

External Reference #:[id number]





Demographics







 Address  [address]



   [place] [address]

 

 Home Phone  [phone]

 

 Email Address  NONE

 

 Preferred Language  Unknown

 

 Marital Status  Unknown

 

 Religion Affiliation  Unknown

 

 Race  Unknown

 

 Additional Race(s)  Unavailable

 

 Ethnic Group  Unknown









Author







 Organization  [facility]

 

 Address  [address] [place] [address] [place] [address]

 

 Phone  [phone]









Care Team Providers







 Name  Role  Phone

 

 Unavailable  Unavailable  Unavailable









Problems

This patient has no known problems.



Allergies, Adverse Reactions, Alerts

This patient has no known allergies or adverse reactions.



Medications

This patient has no known medications.

## 2019-09-02 NOTE — XMS REPORT
Summary of Care

 Created on:2019



Patient:Joe Archuleta

Sex:Male

:1970

External Reference #:[id number]





Demographics







 Address  [address]



   [place] [address]

 

 Mobile Phone  [phone]

 

 Home Phone  [phone]

 

 Phone  [phone]

 

 Email Address  [email or web address]

 

 Preferred Language  English

 

 Marital Status  

 

 Orthodox Affiliation  Unknown

 

 Race  White

 

 Ethnic Group   or 









Author







 Organization  [facility]

 

 Address  [address] [place] [address]









Support







 Name  Relationship  Address  Phone

 

 Chris Archuleta  Unavailable  [address]  [phone]



     [place] [address]  

 

 Mandie Archuleta  Unavailable  [address]  [phone]



     [place] [address]  









Care Team Providers







 Name  Role  Phone

 

 Veronica Huff MD  Primary Care Provider  [phone]









Reason for Visit







 Reason  Comments

 

 Abdominal Pain  



Auth/Cert





 Status  Reason  Specialty  Diagnoses /  Referred By  Referred To



       Procedures  Contact  Contact

 

     Emergency Medicine      [facility]



           Dept







           [address] [place] [address]







           Phone:



           [phone]







           Fax: [phone]









Encounter Details







 Date  Type  Department  Care Team  Description

 

 2019  Emergency  [facility]  Milly Denise,  Other cirrhosis of



     Department



  DO



  liver (Primary Dx)



     [address] [place] [address] Dr



  [address] [place] [address] [id number] [place] [address]



  



     [phone] (Fax)  







Allergies

No Known Allergiesdocumented as of this encounter (statuses as of 2019)



Medications







 Medication  Sig  Dispensed  Refills  Start Date  End Date  Status

 

 lactulose 10 gram/15 mL  Take 30 mL by    0      Active



 solution  mouth 3 (three)          



   times daily as          



   needed for          



   Constipation.          

 

 citalopram 20 mg tablet  Take 20 mg by    0      Active



   mouth daily.          

 

 Insulin Needles,  Use as directed    0      Active



 Disposable, 32 gauge x            



 5/32" Ndle            

 

 glimepiride 1 mg tablet  Take 1 mg by    0      Active



   mouth daily          



   with breakfast.          

 

 esomeprazole (NEXIUM) 20  Take 20 mg by  15 capsule  0  2019    Active



 mg capsuleIndications:  mouth daily          



 Gastrointestinal  before a meal.          



 hemorrhage, unspecified            



 gastrointestinal            



 hemorrhage type            

 

 Insulin Glargine (LANTUS  inject 15 Units  3 mL  0  2019    Active



 SOLOSTAR U-100 INSULIN)  under the skin          



 100 unit/mL (3 mL)  at bedtime as          



 injectionIndications:  needed (basal          



 Hyperglycemia  insulin).          

 

 blood sugar diagnostic  Use as  100 Strip  0  2019    Active



 (CONTOUR NEXT TEST  directed, once          



 STRIPS)  a day to          



 stripIndications:  monitor blood          



 Hyperglycemia  glucose for ICD          



   code E11.9          



documented as of this encounter (statuses as of 2019)



Active Problems







 Problem  Noted Date

 

 Hyperglycemia  2019

 

 Alcoholic cirrhosis  2019

 

 Chronic hepatitis C without hepatic coma  2019

 

 Chronic abdominal pain  2019

 

 Anxiety  2019

 

 Diabetes mellitus type 2 with complications, uncontrolled  2019

 

 Hyperammonemia  2019

 

 Obesity (BMI 30-39.9)  2019

 

 Hepatic encephalopathy  2015

 

 S/P TIPS (transjugular intrahepatic portosystemic shunt)  2015

 

 History of CVA (cerebrovascular accident)  2015

 

 History of MI (myocardial infarction)  2015



documented as of this encounter (statuses as of 2019)



Resolved Problems







 Problem  Noted Date  Resolved Date

 

 History of alcoholism  2019

 

 Hospice care patient  2015



documented as of this encounter (statuses as of 2019)



Immunizations







 Name  Administration Dates  Next Due

 

 Influenza Virus Vaccine Quad IM 3+ YRS  2015  

 

 Pneumococcal Polysaccharide, PPSV23 (PNEUMOVAX)  2015  



documented as of this encounter



Social History







 Tobacco Use  Types  Packs/Day  Years Used  Date

 

 Never Smoker        









 Smokeless Tobacco: Never Used      









 Alcohol Use  Drinks/Week  oz/Week  Comments

 

 Not Currently      









 Education  Answer  Date Recorded

 

 What is the highest level of school you have completed or the  9th grade  



 highest degree you have received?    









 Financial Resource Strain  Answer  Date Recorded

 

 How hard is it for you to pay for the very basics like  Somewhat hard  2019



 food, housing, medical care, and heating?    









 Food Insecurity  Answer  Date Recorded

 

 Within the past 12 months, you worried that your food would  Never true  2019



 run out before you got money to buy more.    

 

 Within the past 12 months, the food you bought just didn't  Never true  2019



 last and you didn't have money to get more.    









 Transportation Needs  Answer  Date Recorded

 

 In the past 12 months, has lack of transportation kept you from  No  2019



 medical appointments or from getting medications?    

 

 In the past 12 months, has lack of transportation kept you from  No  2019



 meetings, work, or getting things needed for daily living?    









 Sex Assigned at Birth  Date Recorded

 

 Not on file  









 Job Start Date  Occupation  Industry

 

 Not on file  Not on file  Not on file









 Travel History  Travel Start  Travel End









 No recent travel history available.



documented as of this encounter



Last Filed Vital Signs







 Vital Sign  Reading  Time Taken  Comments

 

 Blood Pressure  137/65  2019  5:31 PM CDT  

 

 Pulse  75  2019  5:31 PM CDT  

 

 Temperature  36.8 C (98.2 F)  2019  5:31 PM CDT  

 

 Respiratory Rate  18  2019  5:31 PM CDT  

 

 Oxygen Saturation  96%  2019  5:31 PM CDT  

 

 Inhaled Oxygen Concentration  -  -  

 

 Weight  104.3 kg (230 lb)  2019  5:31 PM CDT  

 

 Height  185.4 cm (6' 1")  2019  5:31 PM CDT  

 

 Body Mass Index  30.34  2019  5:31 PM CDT  



documented in this encounter



Plan of Treatment







 Date  Type  Specialty  Care Team  Description

 

 2019  Office Visit  Cardiology  Michael Wright MD



  



       [address] [place] [address] [place] [phone] (Fax)  









 Health Maintenance  Due Date  Last Done  Comments

 

 EYE EXAM  1981    

 

 LDL-C  1981    

 

 URINE MICROALBUMIN  1981    

 

 FOOT EXAM  1989    

 

 DTaP,Tdap,and Td Vaccines (1 -  1990    



 Tdap)      

 

 INFLUENZA VACCINE (#1)  2019  

 

 HgA1C  2020, 2019  

 

 CREATININE (SERUM)  2020, 2019,  



     2019, Additional history  



     exists  

 

 PNEUMOCOCCAL 0-64 YEARS COMBINED  Completed  2015  



 SERIES      



documented as of this encounter



Procedures







 Procedure Name  Priority  Date/Time  Associated  Comments



       Diagnosis  

 

 POCT GLUCOSE  Routine  2019  6:25    Results for this



 (AUTOMATED)    PM CDT    procedure are in



         the results



         section.

 

 BASIC METABOLIC PANEL  STAT  2019  5:47  Other cirrhosis of  Results for 
this



 (NA, K, CL, CO2,    PM CDT  liver  procedure are in



 GLUCOSE, BUN,        the results



 CREATININE, CA)        section.

 

 HEPATIC FUNCTION  STAT  2019  5:47  Other cirrhosis of  Results for this



 PANEL (69967)    PM CDT  liver  procedure are in



 (ALB,T.PRO,BILI        the results



 T,BU/BC,ALT,AST,ALK        section.



 PHOS)        

 

 AMMONIA, PLASMA  STAT  2019  5:47  Other cirrhosis of  Results for this



     PM CDT  liver  procedure are in



         the results



         section.

 

 CBC WITH DIFFERENTIAL  STAT  2019  5:46  Other cirrhosis of  Results for 
this



     PM CDT  liver  procedure are in



         the results



         section.

 

 CBC WITH DIFF  Routine  2019  5:46  Other cirrhosis of  Results for this



     PM CDT  liver  procedure are in



         the results



         section.

 

 CONSENT/REFUSAL FOR  Routine  2019  5:25    



 DIAGNOSIS AND    PM CDT    



 TREATMENT        



documented in this encounter



Results

POCT GLUCOSE (AUTOMATED) (2019  6:25 PM CDT)





 Component  Value  Ref Range  Performed At  Pathologist Signature

 

 POCT GLU  413 (H)  70 - 110 mg/dL  [facility]  



       LABORATORY  









 Specimen

 

 Blood









 Performing Organization  Address  City/[facility]  Phone Number

 

 [facility]  CLIA: [id number], [address]  [place] [address]  



 LABORATORY  [address]    



AMMONIA, PLASMA (2019  5:47 PM CDT)





 Component  Value  Ref Range  Performed At  Pathologist Signature

 

 AMMONIA  97 (H)  9 - 33 umol/L  [facility]  



       LABORATORY  









 Specimen

 

 Blood - VENOUS









 Performing Organization  Address  City/[facility]/Zipcode  Phone Number

 

 [facility]  CLIA: [id number], [address]  [place] [address]  



 LABORATORY  [address]    



Hepatic Function Panel (ALB, T.PRO, BILI T, BU/BC, ALT, AST, ALK PHOS) (2019  5:47 PM CDT)





 Component  Value  Ref Range  Performed At  Pathologist Signature

 

 TOTAL BILI  1.6 (H)  0.1 - 1.1 mg/dL  [facility]  



       LABORATORY  

 

 BILI UNCON  1.5 (H)  0.1 - 1.1 mg/dL  [facility]  



       LABORATORY  

 

 BILI CONJ  0.0  0.0 - 0.3 mg/dL  [facility]  



       LABORATORY  

 

 T PROTEIN  6.9  6.3 - 8.2 g/dL  [facility]  



       LABORATORY  

 

 ALBUMIN  3.2 (L)  3.5 - 5.0 g/dL  [facility]  



       LABORATORY  

 

 ALK PHOS  167 (H)  34 - 122 U/L  [facility]  



       LABORATORY  

 

 ALT(SGPT)  63 (H)  9 - 51 U/L  [facility]  



       LABORATORY  

 

 AST(SGOT)  63 (H)  13 - 40 U/L  [facility]  



       LABORATORY  









 Specimen

 

 Blood - VENOUS









 Performing Organization  Address  City/State/Zipcode  Phone Number

 

 [facility]  CLIA: [id number], [address]  [place] [address]  



 LABORATORY  [address]    



Basic Metabolic Panel (NA, K, CL, CO2, GLUCOSE, BUN, CREATININE, CA) (2019  5:47 PM CDT)





 Component  Value  Ref Range  Performed At  Pathologist Signature

 

 NA  140  135 - 145  [facility]  



     mmol/L  [facility] LABORATORY  

 

 K  3.9  3.5 - 5.0  [facility]  



     mmol/L  [facility] LABORATORY  

 

 CL  108  98 - 108 mmol/L  [facility] LABORATORY  

 

 CO2 TOTAL  23  23 - 31 mmol/L  [facility] LABORATORY  

 

 AGAP  9  2 - 16  [facility] LABORATORY  

 

 BUN  12  7 - 23 mg/dL  [facility] LABORATORY  

 

 GLUCOSE  399 (H)  70 - 110 mg/dL  [facility] LABORATORY  

 

 CREATININE  0.67  0.60 - 1.25  [facility]  



     mg/dL  [facility] LABORATORY  

 

 CALCIUM  8.5 (L)  8.6 - 10.6  [facility]  



     mg/dL  [facility] LABORATORY  

 

 eGFR Calculation  126.6  mL/min/1.73m2  [facility]  



 (Non-[facility] LABORATORY  



 American)        

 

 eGFR Calculation  153.4  mL/min/1.73m2  [facility]  



 ()      [facility] LABORATORY  









 Specimen

 

 Blood - VENOUS









 Narrative  Performed At

 

 Association of Glomerular Filtration Rate (GFR)  [facility] 
LABORATORY



 and Staging of Kidney Disease*



  



 +-----------------------+---------------------+-  



 ------------------------+



  



 | GFR (mL/min/1.73 m2)| With Kidney  



 Damage|Without Kidney Damage



  



 +-----------------------+---------------------+-  



 ------------------------+



  



 |>90|  



 Stage one|  



 Normal



  



 +-----------------------+---------------------+-  



 ------------------------+



  



 |60-89|S  



 tage two| Decreased  



 GFR 



  



 +-----------------------+---------------------+-  



 ------------------------+



  



 |30-59|S  



 tage three| Stage  



 three 



  



 +-----------------------+---------------------+-  



 ------------------------+



  



 |15-29|S  



 tage four | Stage  



 four



  



 +-----------------------+---------------------+-  



 ------------------------+



  



 |<15 (or dialysis)|Stage  



 five | Stage  



 five



  



 +-----------------------+---------------------+-  



 ------------------------+



  



 *Each stage assumes the associated GFR level has  



 been in effect for at least three  



 months.Stages 1 to 5, with or without kidney  



 disease, indicate chronic kidney disease.



  



 Notes: Determination of stages one and two (with  



 eGFR >59mL/min/1.73 m2) requires estimation of  



 kidney damage for at least three months as  



 defined by structural or functional  



 abnormalities of the kidney, manifested by  



 either:



  



 Pathological abnormalities or Markers of kidney  



 damage (including abnormalities in the  



 composition of the blood or urine or  



 abnormalities in imaging tests).  









 Performing Organization  Address  City/State/Zipcode  Phone Number

 

 [facility]  CLIA: [id number], [address]  [place] [address]  



 LABORATORY  [address]    



CBC WITH DIFFERENTIAL (2019  5:46 PM CDT)





 Component  Value  Ref Range  Performed At  Pathologist



         Signature

 

 WBC  3.87 (L)  4.20 - 10.70  [facility]  



     10*3/L  [facility]  



       LABORATORY  

 

 RBC  4.56  4.26 - 5.52  [facility]  



     10*6/L  [facility]  



       LABORATORY  

 

 HGB  14.1  12.2 - 16.4  [facility]  



     g/dL  [facility]  



       LABORATORY  

 

 HCT  40.7  38.4 - 49.3 %  [facility]  



       LABORATORY  

 

 MCV  89.3  81.7 - 95.6  [facility]  



       LABORATORY  

 

 MCH  30.9  26.1 - 32.7  [facility]  



       LABORATORY  

 

 MCHC  34.6  31.2 - 35.0  [facility]  



     g/dL  [facility]  



       LABORATORY  

 

 RDW-SD  44.7  38.5 - 51.6  [facility]  



       LABORATORY  

 

 RDW-CV  13.8  12.1 - 15.4 %  [facility]  



       LABORATORY  

 

 PLT  93 (L)  150 - 328  [facility]  



     10*3/L  [facility]  



       LABORATORY  

 

 MPV  11.3  9.8 - 13.0 fL  [facility]  



       LABORATORY  

 

 IPF %  3.0Comment: Platelet  1.2 - 10.7 %  [facility]  



   count measured by    HOSPITAL  



   fluorescence method.    LABORATORY  

 

 NRBC/100 WBC  0.0  0.0 - 10.0  [facility]  



     /100 WBCs  [facility]  



       LABORATORY  

 

 NRBC x10^3  <0.01  10*3/L  [facility]  



       LABORATORY  

 

 GRAN MAT (NEUT) %  56.4  %  [facility]  



       LABORATORY  

 

 IMM GRAN %  0.50  %  [facility]  



       LABORATORY  

 

 LYMPH %  26.1  %  [facility]  



       LABORATORY  

 

 MONO %  13.2  %  [facility]  



       LABORATORY  

 

 EOS %  2.8  %  [facility]  



       LABORATORY  

 

 BASO %  1.0  %  [facility]  



       LABORATORY  

 

 GRAN MAT  2.18  1.99 - 6.95  [facility]  



 x10^3(ANC)    10*3/uL  [facility]  



       LABORATORY  

 

 IMM GRAN x10^3  <0.03  0.00 - 0.06  [facility]  



     10*3/uL  HOSPITAL  



       LABORATORY  

 

 LYMPH x10^3  1.01 (L)  1.09 - 3.23  [facility]  



     10*3/uL  [facility]  



       LABORATORY  

 

 MONO x10^3  0.51  0.36 - 1.02  [facility]  



     10*3/uL  [facility]  



       LABORATORY  

 

 EOS x10^3  0.11  0.06 - 0.53  [facility]  



     10*3/uL  [facility]  



       LABORATORY  

 

 BASO x10^3  0.04  0.01 - 0.09  [facility]  



     10*3/uL  [facility]  



       LABORATORY  









 Specimen

 

 Blood - VENOUS









 Performing Organization  Address  City/State/Zipcode  Phone Number

 

 [facility]  CLIA: [id number], [address]  [place] [address]  



 LABORATORY  [address]    



documented in this encounter



Visit Diagnoses







 Diagnosis

 

 Other cirrhosis of liver - Primary



documented in this encounter



Administered Medications







 Medication Order  MAR Action  Action Date  Dose  Rate  Site

 

 insulin regular human (HUMULIN  Given  2019  6:26 PM CDT  10 Units    



 R) injection 10 Units



          



 10 Units, Slow IV Push, ONCE, 1          



 dose, Sun 19 at 1930, ASAP          









   









 lactulose (CEPHULAC) solution 60 mL



  Given  2019  6:28 PM CDT  60 mL    



 60 mL, Oral, ONCE, 1 dose, Sun 19 at          



 1930, ASAP          









   



documented in this encounter



Insurance







 Payer  Benefit Plan /  Subscriber ID  Effective Dates  Phone  Address  Type



   Group          

 

 [facility]  MEDICAID OF  xxxxxxxxx  2019-Present  [phone]  [address]  Medicaid



   [place]        [address]



  



           [place]  



           [address]  









 Guarantor Name  Account Type  Relation to  Date of Birth  Phone  Billing



     Patient      Address

 

 Joe Archuleta  Personal/Family  Self  1970  [phone]  [address]







         (Home)  [address]







           [place]



           [address]



documented as of this encounter

## 2019-09-02 NOTE — XMS REPORT
Summary of Care

 Created on:2019



Patient:Joe Archuleta

Sex:Male

:1970

External Reference #:[id number]





Demographics







 Address  [address]



   [place] [address]

 

 Mobile Phone  [phone]

 

 Home Phone  [phone]

 

 Phone  [phone]

 

 Preferred Language  English

 

 Marital Status  

 

 Adventism Affiliation  Unknown

 

 Race  White

 

 Ethnic Group   or 









Author







 Organization  [facility]

 

 Address  [address] [place] [address]









Support







 Name  Relationship  Address  Phone

 

 Mandie Blair  Unavailable  [address]  [phone]



     [place] [address]  









Care Team Providers







 Name  Role  Phone

 

 Veronica Huff MD  Primary Care Provider  [phone]









Reason for Visit







 Reason  Comments

 

 [facility]







Encounter Details







 Date  Type  Department  Care Team  Description

 

 2019  Patient Outreach  [facility] Family  Karen Jim,  [facility]



     Medicine [facility]



  ([facility])



     [address] [address]



  



     [id number]



  [phone]    







Allergies

No Known Allergiesdocumented as of this encounter (statuses as of 2019)



Medications







 Medication  Sig  Dispensed  Refills  Start Date  End Date  Status

 

 furosemide 40 mg  TAKE 1 TABLET BY    0  2019    Active



 tablet  MOUTH ONCE DAILY          

 

 spironolactone 100 mg  TAKE 1 TABLET BY    1  2019    Active



 tablet  MOUTH ONCE DAILY          

 

 Insulin Glargine  inject 15 Units  6 mL  2019    Active



 (LANTUS SOLOSTAR  under the skin at          



 U-100 INSULIN) 100  bedtime.          



 unit/mL (3 mL)            



 injectionIndications:            



 Diabetes mellitus            



 type 2 with            



 complications,            



 uncontrolled            

 

 Insulin Needles,  Use as directed  100 Each  2019    Active



 Disposable, (PEN  daily to inject          



 NEEDLE) 32 gauge x  insulin          



 "            



 NdleIndications:            



 Diabetes mellitus            



 type 2 with            



 complications,            



 uncontrolled            

 

 glimepiride 1 mg  Take 1 tablet by  30 tablet  2019    Active



 tabletIndications:  mouth daily with          



 Diabetes mellitus  breakfast.          



 type 2 with            



 complications,            



 uncontrolled            

 

 pantoprazole 40 mg EC  Take 1 tablet by    0  2019    Active



 tablet  mouth daily.          

 

 lactulose 10 gram/15  Take 30 mL by mouth  2700 mL  1  2019    Active



 mL  3 (three) times          



 solutionIndications:  daily as needed for          



 Hepatic  Constipation or          



 encephalopathy  Encephalophathy.          

 

 ENEMA BAG  Use as directed  1 Each  5  2019    Active



 MiscIndications:            



 Hepatic            



 encephalopathy,            



 Hyperammonemia            

 

 citalopram 20 mg  Take 1 tablet by  30 tablet  2  2019    Active



 tabletIndications:  mouth daily.          



 Anxiety            



documented as of this encounter (statuses as of 2019)



Active Problems







 Problem  Noted Date

 

 Alcoholic cirrhosis  2019

 

 Chronic hepatitis C without hepatic coma  2019

 

 Chronic abdominal pain  2019

 

 Anxiety  2019

 

 Diabetes mellitus type 2 with complications, uncontrolled  2019

 

 Hyperammonemia  2019

 

 Obesity (BMI 30-39.9)  2019

 

 Hepatic encephalopathy  2015

 

 S/P TIPS (transjugular intrahepatic portosystemic shunt)  2015

 

 History of CVA (cerebrovascular accident)  2015

 

 History of MI (myocardial infarction)  2015



documented as of this encounter (statuses as of 2019)



Resolved Problems







 Problem  Noted Date  Resolved Date

 

 History of alcoholism  2019

 

 Hospice care patient  2015



documented as of this encounter (statuses as of 2019)



Immunizations







 Name  Administration Dates  Next Due

 

 Influenza Virus Vaccine Quad IM 3+ YRS  2015  

 

 Pneumococcal Polysaccharide, PPSV23 (PNEUMOVAX)  2015  



documented as of this encounter



Social History







 Tobacco Use  Types  Packs/Day  Years Used  Date

 

 Never Smoker        









 Smokeless Tobacco: Never Used      









 Alcohol Use  Drinks/Week  oz/Week  Comments

 

 Not Currently      









 Education  Answer  Date Recorded

 

 What is the highest level of school you have completed or the  9th grade  



 highest degree you have received?    









 Financial Resource Strain  Answer  Date Recorded

 

 How hard is it for you to pay for the very basics like  Somewhat hard  2019



 food, housing, medical care, and heating?    









 Food Insecurity  Answer  Date Recorded

 

 Within the past 12 months, you worried that your food would  Never true  2019



 run out before you got money to buy more.    

 

 Within the past 12 months, the food you bought just didn't  Never true  2019



 last and you didn't have money to get more.    









 Transportation Needs  Answer  Date Recorded

 

 In the past 12 months, has lack of transportation kept you from  No  2019



 medical appointments or from getting medications?    

 

 In the past 12 months, has lack of transportation kept you from  No  2019



 meetings, work, or getting things needed for daily living?    









 Sex Assigned at Birth  Date Recorded

 

 Not on file  









 Job Start Date  Occupation  Industry

 

 Not on file  Not on file  Not on file









 Travel History  Travel Start  Travel End









 No recent travel history available.



documented as of this encounter



Last Filed Vital Signs

Not on filedocumented in this encounter



Progress Notes

Karen iJm, JUNIOR - 2019  3:55 PM CDTSocial Work Note



LMSW received a referral to get patient set up with home health as soon as 
possible.



LMSW reviewed patient chart and patient has traditional Medicaid.



LMSW was informed that patient is choosing to go with [facility] ([phone]
[phone] / fax [phone]).



Care Management Patient Choice Notification

Ambulatory Social Work



Patient's provider has recommended post-acute care, home health services, and/
or durable medical equipment.  Based on where Patient resides, and agency 
service areas, a list was generated from:



 Your insurance company's in-network provider list

 www.Medicare.gov



If post-acute services were recommended, Patient was informed of the following 
disclosure: [facility] is affiliated with the following facilities/agencies:

 [facility] (skilled nursing and rehabilitation)

On 2019, Patient chose the following agencies to provide services:



* [facility] ([phone] / fax [phone])



LMSW verified that [facility] takes patient's insurance and covers patient's 
area.



LMSW faxed over the referral to [facility] from the 19 office visit.



OZZYSW was contacted back by Kesha with [facility] and informed they do not 
have a SN to cover that area and cannot accept the referral at this time.



OZZYSW spoke with patient's spouse Mandie ([phone]) and discussed new 
DME location options.  It was during this time patient's spouse started 
requesting hospice for patient.  LMSW informed her that was not what MD had 
ordered and that would have to be a discussion with the MD.  She agreed to call 
the clinic and discuss this matter further with MD / MD staff.



Please send new ambulatory SW referral if hospice is being requested by MD.



Please consult [facility] if additional needs arise.



Karen Jim LMSW

Social Work - Care Management

[phone] cell



Electronically signed by Karen Jim LMSW at 2019  4:44 PM 
CDTdocumented in this encounter



Plan of Treatment







 Date  Type  Specialty  Care Team  Description

 

 2019  Office Visit  Family Medicine  Veronica Huff MD



  



       [address] [place] [address] [place] [address] [phone] (Fax)  









 Health Maintenance  Due Date  Last Done  Comments

 

 EYE EXAM  1981    

 

 LDL-C  1981    

 

 URINE MICROALBUMIN  1981    

 

 FOOT EXAM  1989    

 

 DTaP,Tdap,and Td Vaccines (1 -  1990    



 Tdap)      

 

 INFLUENZA VACCINE  2019  

 

 HgA1C  2020  

 

 CREATININE (SERUM)  2020, 2019,  



     2019, Additional history  



     exists  

 

 PNEUMOCOCCAL 0-64 YEARS COMBINED  Completed  2015  



 SERIES      



documented as of this encounter



Results

Not on filedocumented in this encounter



Insurance







 Payer  Benefit Plan /  Subscriber ID  Effective Dates  Phone  Address  Type



   Group          

 

 [facility]  MEDICAID OF  xxxxxxxxx  2019-Present  [phone]  [address]  [place]icaid [place] [id number] [place], [place]  



           [address]  



documented as of this encounter

## 2019-09-02 NOTE — XMS REPORT
Summary of Care

 Created on:2019



Patient:Joe Archuleta

Sex:Male

:1970

External Reference #:[id number]





Demographics







 Address  [address]



   [place] [address]

 

 Mobile Phone  [phone]

 

 Home Phone  [phone]

 

 Phone  [phone]

 

 Preferred Language  English

 

 Marital Status  

 

 Hindu Affiliation  Unknown

 

 Race  White

 

 Ethnic Group   or 









Author







 Organization  [facility]

 

 Address  [address] [place], [place] [id number]









Support







 Name  Relationship  Address  Phone

 

 Mandie Blair  Unavailable  [address]  [phone]



     [place] [id number]  









Care Team Providers







 Name  Role  Phone

 

 Veronica Huff MD  Primary Care Provider  [phone]









Encounter Details







 Date  Type  Department  Care Team  Description

 

 2019  Orders Only  [facility]



  Doctor Unassigned, No  



     [address] [facility]



  Name



  



     [place] [id number]  [address] [place] [address]  







Allergies

No Known Allergiesdocumented as of this encounter (statuses as of 2019)



Medications







 Medication  Sig  Dispensed  Refills  Start Date  End Date  Status

 

 furosemide 40 mg  TAKE 1 TABLET BY    0  2019    Active



 tablet  MOUTH ONCE DAILY          

 

 spironolactone 100 mg  TAKE 1 TABLET BY    1  2019    Active



 tablet  MOUTH ONCE DAILY          

 

 Insulin Glargine  inject 15 Units  6 mL  2019    Active



 (LANTUS SOLOSTAR  under the skin at          



 U-100 INSULIN) 100  bedtime.          



 unit/mL (3 mL)            



 injectionIndications:            



 Diabetes mellitus            



 type 2 with            



 complications,            



 uncontrolled            

 

 Insulin Needles,  Use as directed  100 Each  2019    Active



 Disposable, (PEN  daily to inject          



 NEEDLE) 32 gauge x  insulin          



 "            



 NdleIndications:            



 Diabetes mellitus            



 type 2 with            



 complications,            



 uncontrolled            

 

 glimepiride 1 mg  Take 1 tablet by  30 tablet  2019    Active



 tabletIndications:  mouth daily with          



 Diabetes mellitus  breakfast.          



 type 2 with            



 complications,            



 uncontrolled            

 

 pantoprazole 40 mg EC  Take 1 tablet by    0  2019    Active



 tablet  mouth daily.          

 

 lactulose 10 gram/15  Take 30 mL by mouth  2700 mL  2019    Active



 mL  3 (three) times          



 solutionIndications:  daily as needed for          



 Hepatic  Constipation or          



 encephalopathy  Encephalophathy.          

 

 ENEMA BAG  Use as directed  1 Each  5  2019    Active



 MiscIndications:            



 Hepatic            



 encephalopathy,            



 Hyperammonemia            

 

 citalopram 20 mg  Take 1 tablet by  30 tablet  2  2019    Active



 tabletIndications:  mouth daily.          



 Anxiety            



documented as of this encounter (statuses as of 2019)



Active Problems







 Problem  Noted Date

 

 Alcoholic cirrhosis  2019

 

 Chronic hepatitis C without hepatic coma  2019

 

 Chronic abdominal pain  2019

 

 Anxiety  2019

 

 Diabetes mellitus type 2 with complications, uncontrolled  2019

 

 Hyperammonemia  2019

 

 Obesity (BMI 30-39.9)  2019

 

 Hepatic encephalopathy  2015

 

 S/P TIPS (transjugular intrahepatic portosystemic shunt)  2015

 

 History of CVA (cerebrovascular accident)  2015

 

 History of MI (myocardial infarction)  2015



documented as of this encounter (statuses as of 2019)



Resolved Problems







 Problem  Noted Date  Resolved Date

 

 History of alcoholism  2019

 

 Hospice care patient  2015



documented as of this encounter (statuses as of 2019)



Immunizations







 Name  Administration Dates  Next Due

 

 Influenza Virus Vaccine Quad IM 3+ YRS  2015  

 

 Pneumococcal Polysaccharide, PPSV23 (PNEUMOVAX)  2015  



documented as of this encounter



Social History







 Tobacco Use  Types  Packs/Day  Years Used  Date

 

 Never Smoker        









 Smokeless Tobacco: Never Used      









 Alcohol Use  Drinks/Week  oz/Week  Comments

 

 Not Currently      









 Education  Answer  Date Recorded

 

 What is the highest level of school you have completed or the  9th grade  



 highest degree you have received?    









 Financial Resource Strain  Answer  Date Recorded

 

 How hard is it for you to pay for the very basics like  Somewhat hard  2019



 food, housing, medical care, and heating?    









 Food Insecurity  Answer  Date Recorded

 

 Within the past 12 months, you worried that your food would  Never true  2019



 run out before you got money to buy more.    

 

 Within the past 12 months, the food you bought just didn't  Never true  2019



 last and you didn't have money to get more.    









 Transportation Needs  Answer  Date Recorded

 

 In the past 12 months, has lack of transportation kept you from  No  2019



 medical appointments or from getting medications?    

 

 In the past 12 months, has lack of transportation kept you from  No  2019



 meetings, work, or getting things needed for daily living?    









 Sex Assigned at Birth  Date Recorded

 

 Not on file  









 Job Start Date  Occupation  Industry

 

 Not on file  Not on file  Not on file









 Travel History  Travel Start  Travel End









 No recent travel history available.



documented as of this encounter



Last Filed Vital Signs

Not on filedocumented in this encounter



Plan of Treatment







 Date  Type  Specialty  Care Team  Description

 

 2019  Office Visit  Family Medicine  Veronica Huff MD



  



       [address] [place] [address] [place] [id number] [phone] (Fax)  









 Health Maintenance  Due Date  Last Done  Comments

 

 EYE EXAM  1981    

 

 LDL-C  1981    

 

 URINE MICROALBUMIN  1981    

 

 FOOT EXAM  1989    

 

 DTaP,Tdap,and Td Vaccines (1 -  1990    



 Tdap)      

 

 INFLUENZA VACCINE  2019  

 

 HgA1C  2020  

 

 CREATININE (SERUM)  2020, 2019,  



     2019, Additional history  



     exists  

 

 PNEUMOCOCCAL 0-64 YEARS COMBINED  Completed  2015  



 SERIES      



documented as of this encounter



Procedures







 Procedure Name  Priority  Date/Time  Associated Diagnosis  Comments

 

 CONSENT/REFUSAL FOR  Routine  2019  6:34 PM CDT    



 DIAGNOSIS AND TREATMENT        



documented in this encounter



Results

Not on filedocumented in this encounter



Insurance







 Payer  Benefit Plan /  Subscriber ID  Effective Dates  Phone  Address  Type



   Group          

 

 [employer] MEDICAID OF  xxxxxxxxx  2019-Present  [phone]  [address]  [place]icaid [place]        [id number] [place] [address]  



           [id number]  



documented as of this encounter

## 2019-09-02 NOTE — XMS REPORT
Summary of Care

 Created on:2019



Patient:Joe Archuleta

Sex:Male

:1970

External Reference #:[id number]





Demographics







 Address  [address]



   [place] [address]

 

 Mobile Phone  [phone]

 

 Home Phone  [phone]

 

 Phone  [phone]

 

 Email Address  [email or web address]

 

 Preferred Language  English

 

 Marital Status  

 

 Scientology Affiliation  Unknown

 

 Race  White

 

 Ethnic Group   or 









Author







 Organization  [facility]

 

 Address  [address] [place] [address]









Support







 Name  Relationship  Address  Phone

 

 Chris Archuleta  Unavailable  Unavailable  [phone]









Care Team Providers







 Name  Role  Phone

 

 Veronica Huff MD  Primary Care Provider  [phone]









Reason for Visit







 Reason  Comments

 

 Vomiting Blood  x1 episode



Auth/Cert





 Status  Reason  Specialty  Diagnoses /  Referred By  Referred To



       Procedures  Contact  Contact

 

     Emergency Medicine      [facility]



           Dept







           [address] [place] [address]



           [place]



           [address]







           Phone:



           [phone]







           Fax: [phone]









Encounter Details







 Date  Type  Department  Care Team  Description

 

 2019  Emergency  [facility]  Laura Richard S,  Gastrointestinal 
hemorrhage, unspecified gastrointestinal hemorrhage type (Primary Dx);



     Department



  PAC



  Hematemesis without nausea



     [address] [place] [address] [place] [address]



  [place], [place] [address] [phone] (Fax)  







Allergies

No Known Allergiesdocumented as of this encounter (statuses as of 2019)



Medications







 Medication  Sig  Dispensed  Refills  Start Date  End Date  Status

 

 lactulose 10 gram/15 mL  Take 30 mL by    0      Active



 solution  mouth 3 (three)          



   times daily as          



   needed for          



   Constipation.          

 

 citalopram 20 mg tablet  Take 20 mg by    0      Active



   mouth daily.          

 

 Insulin Glargine (LANTUS  inject 15 Units    0      Active



 SOLOSTAR U-100 INSULIN)  under the skin          



 100 unit/mL (3 mL)  at bedtime as          



 injection  needed.          

 

 Insulin Needles,  Use as directed    0      Active



 Disposable, 32 gauge x            



 5/32" Ndle            

 

 glimepiride 1 mg tablet  Take 1 mg by    0      Active



   mouth daily          



   with breakfast.          

 

 blood sugar diagnostic  Use as  100 Strip  0  2019    Active



 (CONTOUR NEXT TEST  directed, once          



 STRIPS) strip  a day to          



   monitor blood          



   glucose for ICD          



   code E11.9          

 

 esomeprazole (NEXIUM) 20  Take 20 mg by  15 capsule  0  2019    Active



 mg capsuleIndications:  mouth daily          



 Gastrointestinal  before a meal.          



 hemorrhage, unspecified            



 gastrointestinal            



 hemorrhage type            



documented as of this encounter (statuses as of 2019)



Active Problems







 Problem  Noted Date

 

 Hyperglycemia  2019

 

 Alcoholic cirrhosis  2019

 

 Chronic hepatitis C without hepatic coma  2019

 

 Chronic abdominal pain  2019

 

 Anxiety  2019

 

 Diabetes mellitus type 2 with complications, uncontrolled  2019

 

 Hyperammonemia  2019

 

 Obesity (BMI 30-39.9)  2019

 

 Hepatic encephalopathy  2015

 

 S/P TIPS (transjugular intrahepatic portosystemic shunt)  2015

 

 History of CVA (cerebrovascular accident)  2015

 

 History of MI (myocardial infarction)  2015



documented as of this encounter (statuses as of 2019)



Resolved Problems







 Problem  Noted Date  Resolved Date

 

 History of alcoholism  2019

 

 Hospice care patient  2015



documented as of this encounter (statuses as of 2019)



Immunizations







 Name  Administration Dates  Next Due

 

 Influenza Virus Vaccine Quad IM 3+ YRS  2015  

 

 Pneumococcal Polysaccharide, PPSV23 (PNEUMOVAX)  2015  



documented as of this encounter



Social History







 Tobacco Use  Types  Packs/Day  Years Used  Date

 

 Never Smoker        









 Smokeless Tobacco: Never Used      









 Alcohol Use  Drinks/Week  oz/Week  Comments

 

 Not Currently      









 Education  Answer  Date Recorded

 

 What is the highest level of school you have completed or the  9th grade  



 highest degree you have received?    









 Financial Resource Strain  Answer  Date Recorded

 

 How hard is it for you to pay for the very basics like  Somewhat hard  2019



 food, housing, medical care, and heating?    









 Food Insecurity  Answer  Date Recorded

 

 Within the past 12 months, you worried that your food would  Never true  2019



 run out before you got money to buy more.    

 

 Within the past 12 months, the food you bought just didn't  Never true  2019



 last and you didn't have money to get more.    









 Transportation Needs  Answer  Date Recorded

 

 In the past 12 months, has lack of transportation kept you from  No  2019



 medical appointments or from getting medications?    

 

 In the past 12 months, has lack of transportation kept you from  No  2019



 meetings, work, or getting things needed for daily living?    









 Sex Assigned at Birth  Date Recorded

 

 Not on file  









 Job Start Date  Occupation  Industry

 

 Not on file  Not on file  Not on file









 Travel History  Travel Start  Travel End









 No recent travel history available.



documented as of this encounter



Last Filed Vital Signs







 Vital Sign  Reading  Time Taken  Comments

 

 Blood Pressure  114/60  2019  7:00 PM CDT  

 

 Pulse  67  2019  7:00 PM CDT  

 

 Temperature  36.6 C (97.8 F)  2019  4:57 PM CDT  

 

 Respiratory Rate  16  2019  7:00 PM CDT  

 

 Oxygen Saturation  94%  2019  7:00 PM CDT  

 

 Inhaled Oxygen Concentration  -  -  

 

 Weight  104.3 kg (230 lb)  2019  4:57 PM CDT  

 

 Height  -  -  

 

 Body Mass Index  30.34  2019  4:07 PM CDT  



documented in this encounter



Discharge Instructions

Laura Goetz, PAC - 2019Take the prescription medication 
daily and follow up with a GI specialist for further evaluation of GI bleeding. 
Return to the ER if you start vomiting large amounts of blood, if you develop 
black tar like stool, or if you have dizziness, chest pain or shortness of 
breath.

AttachmentsThe following attachments cannot be sent through Care 
Everywhere.Upper GI Bleeding (Stable) (English)documented in this encounter



Plan of Treatment







 Health Maintenance  Due Date  Last Done  Comments

 

 EYE EXAM  1981    

 

 LDL-C  1981    

 

 URINE MICROALBUMIN  1981    

 

 FOOT EXAM  1989    

 

 DTaP,Tdap,and Td Vaccines (1 -  1990    



 Tdap)      

 

 INFLUENZA VACCINE (#1)  2019  

 

 HgA1C  2020, 2019  

 

 CREATININE (SERUM)  2020, 2019,  



     2019, Additional history  



     exists  

 

 PNEUMOCOCCAL 0-64 YEARS COMBINED  Completed  2015  



 SERIES      



documented as of this encounter



Procedures







 Procedure Name  Priority  Date/Time  Associated Diagnosis  Comments

 

 CBC WITH DIFFERENTIAL  STAT  2019  5:54  Hematemesis without  Results 
for this



     PM CDT  nausea  procedure are in



         the results



         section.

 

 ACTIVATED PARTIAL  STAT  2019  5:54  Hematemesis without  Results for 
this



 THRMPLAS JOESPH    PM CDT  nausea  procedure are in



         the results



         section.

 

 PROTHROMBIN TIME /  STAT  2019  5:54  Hematemesis without  Results for 
this



 INR    PM CDT  nausea  procedure are in



         the results



         section.

 

 CBC WITH DIFF  STAT  2019  5:54  Hematemesis without  Results for this



     PM CDT  nausea  procedure are in



         the results



         section.

 

 COMP. METABOLIC PANEL  STAT  2019  5:54  Hematemesis without  Results 
for this



 ([address])    PM CDT  nausea  procedure are in



         the results



         section.



documented in this encounter



Results

CBC WITH DIFFERENTIAL (2019  5:54 PM CDT)





 Component  Value  Ref Range  Performed At  Pathologist



         Signature

 

 WBC  2.58 (L)  4.20 - 10.70  [facility]  



     10*3/L  [facility]  



       LABORATORY  

 

 RBC  3.96 (L)  4.26 - 5.52  [facility]  



     10*6/L  [facility]  



       LABORATORY  

 

 HGB  12.4  12.2 - 16.4  [facility]  



     g/dL  [facility]  



       LABORATORY  

 

 HCT  35.7 (L)  38.4 - 49.3 %  [facility]  



       LABORATORY  

 

 MCV  90.2  81.7 - 95.6  [facility]  



       LABORATORY  

 

 MCH  31.3  26.1 - 32.7  [facility]  



       LABORATORY  

 

 MCHC  34.7  31.2 - 35.0  [facility]  



     g/dL  [facility]  



       LABORATORY  

 

 RDW-SD  47.5  38.5 - 51.6  [facility]  



       LABORATORY  

 

 RDW-CV  14.5  12.1 - 15.4 %  [facility]  



       LABORATORY  

 

 PLT  76 (L)  150 - 328  [facility]  



     10*3/L  [facility]  



       LABORATORY  

 

 MPV  11.3  9.8 - 13.0 fL  [facility]  



       LABORATORY  

 

 IPF %  3.2Comment: Platelet  1.2 - 10.7 %  [facility]  



   count measured by    HOSPITAL  



   fluorescence method.    LABORATORY  

 

 NRBC/100 WBC  0.0  0.0 - 10.0  [facility]  



     /100 WBCs  [facility]  



       LABORATORY  

 

 NRBC x10^3  <0.01  10*3/L  [facility]  



       LABORATORY  

 

 GRAN MAT (NEUT) %  58.5  %  [facility]  



       LABORATORY  

 

 IMM GRAN %  0.00  %  [facility]  



       LABORATORY  

 

 LYMPH %  22.9  %  [facility]  



       LABORATORY  

 

 MONO %  14.3  %  [facility]  



       LABORATORY  

 

 EOS %  3.1  %  [facility]  



       LABORATORY  

 

 BASO %  1.2  %  [facility]  



       LABORATORY  

 

 GRAN MAT  1.51 (L)  1.99 - 6.95  [facility]  



 x10^3(ANC)    10*3/uL  HOSPITAL  



       LABORATORY  

 

 IMM GRAN x10^3  <0.03  0.00 - 0.06  [facility]  



     10*3/uL  HOSPITAL  



       LABORATORY  

 

 LYMPH x10^3  0.59 (L)  1.09 - 3.23  [facility]  



     10*3/uL  HOSPITAL  



       LABORATORY  

 

 MONO x10^3  0.37  0.36 - 1.02  [facility]  



     10*3/uL  [facility]  



       LABORATORY  

 

 EOS x10^3  0.08  0.06 - 0.53  [facility]  



     10*3/uL  [facility]  



       LABORATORY  

 

 BASO x10^3  0.03  0.01 - 0.09  [facility]  



     10*3/uL  [facility]  



       LABORATORY  









 Specimen

 

 Blood - VENOUS









 Performing Organization  Address  City/State/Zipcode  Phone Number

 

 [facility]  CLIA: [id number], [address]  [place] [address]  



 LABORATORY  [address]    



COMP. METABOLIC PANEL (37635) (2019  5:54 PM CDT)





 Component  Value  Ref Range  Performed At  Pathologist



         Signature

 

 NA  144  135 - 145  [facility]  



     mmol/L  [facility] LABORATORY  

 

 K  3.9  3.5 - 5.0  [facility]  



     mmol/L  [facility] LABORATORY  

 

 CL  113 (H)  98 - 108 mmol/L  [facility] LABORATORY  

 

 CO2 TOTAL  26  23 - 31 mmol/L  [facility] LABORATORY  

 

 AGAP  5  2 - 16  [facility] LABORATORY  

 

 BUN  13  7 - 23 mg/dL  [facility] LABORATORY  

 

 GLUCOSE  228 (H)  70 - 110 mg/dL  [facility] LABORATORY  

 

 CREATININE  0.54 (L)  0.60 - 1.25  [facility]  



     mg/dL  [facility] LABORATORY  

 

 TOTAL BILI  1.9 (H)  0.1 - 1.1 mg/dL  [facility] LABORATORY  

 

 CALCIUM  8.7  8.6 - 10.6  [facility]  



     mg/dL  [facility] LABORATORY  

 

 T PROTEIN  5.8 (L)  6.3 - 8.2 g/dL  [facility] LABORATORY  

 

 ALBUMIN  2.7 (L)  3.5 - 5.0 g/dL  [facility] LABORATORY  

 

 ALK PHOS  132 (H)  34 - 122 U/L  [facility] LABORATORY  

 

 ALT(SGPT)  65 (H)  9 - 51 U/L  [facility] LABORATORY  

 

 AST(SGOT)  78 (H)  13 - 40 U/L  [facility] LABORATORY  

 

 eGFR Calculation  162.4  mL/min/1.73m2  [facility]  



 (Non-[facility] LABORATORY  



 American)        

 

 eGFR Calculation  196.8  mL/min/1.73m2  [facility]  



 ()      [facility] LABORATORY  









 Specimen

 

 Blood - VENOUS









 Narrative  Performed At

 

 Association of Glomerular Filtration Rate (GFR)  [facility] 
LABORATORY



 and Staging of Kidney Disease*



  



 +-----------------------+---------------------+-  



 ------------------------+



  



 | GFR (mL/min/1.73 m2)| With Kidney  



 Damage|Without Kidney Damage



  



 +-----------------------+---------------------+-  



 ------------------------+



  



 |>90|  



 Stage one|  



 Normal



  



 +-----------------------+---------------------+-  



 ------------------------+



  



 |60-89|S  



 tage two| Decreased  



 GFR 



  



 +-----------------------+---------------------+-  



 ------------------------+



  



 |30-59|S  



 tage three| Stage  



 three 



  



 +-----------------------+---------------------+-  



 ------------------------+



  



 |15-29|S  



 tage four | Stage  



 four



  



 +-----------------------+---------------------+-  



 ------------------------+



  



 |<15 (or dialysis)|Stage  



 five | Stage  



 five



  



 +-----------------------+---------------------+-  



 ------------------------+



  



 *Each stage assumes the associated GFR level has  



 been in effect for at least three  



 months.Stages 1 to 5, with or without kidney  



 disease, indicate chronic kidney disease.



  



 Notes: Determination of stages one and two (with  



 eGFR >59mL/min/1.73 m2) requires estimation of  



 kidney damage for at least three months as  



 defined by structural or functional  



 abnormalities of the kidney, manifested by  



 either:



  



 Pathological abnormalities or Markers of kidney  



 damage (including abnormalities in the  



 composition of the blood or urine or  



 abnormalities in imaging tests).  









 Performing Organization  Address  City/[facility]  Phone Number

 

 [facility]  CLIA: [id number], [address] [place], [place] [id number]  



 LABORATORY  [address]    



aPTT (2019  5:54 PM CDT)





 Component  Value  Ref Range  Performed At  Pathologist [facility]

 

 APTT Patient  39 (H)  23 - 38 Seconds  [facility]  



       LABORATORY  









 Specimen

 

 Blood - VENOUS









 Narrative  Performed At

 

 [facility] patient population mean normal value  [facility] 
LABORATORY



 for aPTT is 30 seconds.  









 Performing Organization  Address  City/State/Zipcode  Phone Number

 

 [facility]  CLIA: [id number], [address]  [place] [address]  



 LABORATORY  [address]    



PROTHROMBIN TIME / INR (2019  5:54 PM CDT)





 Component  Value  Ref Range  Performed At  Pathologist



         Signature

 

 PROTIME PATIENT  17.0 (H)  12.0 - 14.7  [facility]  



       LABORATORY  

 

 INR  1.5Comment: Normal    [facility]  



   INR <1.1; Warfarin    [facility]  



   Therapeutic range    LABORATORY  



   2.0 to 3.0 or 2.5      



   to 3.5, depending      



   upon the      



   indications.      









 Specimen

 

 Blood - VENOUS









 Performing Organization  Address  City/State/Zipcode  Phone Number

 

 [facility]  CLIA: [id number], [address]  [place] [address]  



 LABORATORY  [address]    



documented in this encounter



Visit Diagnoses







 Diagnosis

 

 Gastrointestinal hemorrhage, unspecified gastrointestinal hemorrhage type - 
Primary

 

 Hematemesis without nausea



documented in this encounter



Administered Medications







 Medication Order  MAR Action  Action Date  Dose  Rate  Site

 

 pantoprazole (PROTONIX) 40 mg in  Given  2019  7:45 PM CDT  40 mg    



 NaCl 0.9% (NS) 20 mL syringe



          



 40 mg, IV Push, ONCE, 1 dose, 19 at 1945, 20 mL          









   



documented in this encounter



Insurance







 Payer  Benefit Plan /  Subscriber ID  Effective Dates  Phone  Address  Type



   Group          

 

 [facility]  MEDICAID OF  xxxxxxxxx  2019-Present  [phone]  [address]  [place]icaid [place]        [id number] [place] [address]  



           [id number]  









 Guarantor Name  Account Type  Relation to  Date of Birth  Phone  Billing



     Patient      Address

 

 Joe Archuleta  Personal/Family  Self  1970  [phone]  [address]







         (Home)  [place]



           [address]



documented as of this encounter

## 2019-09-02 NOTE — XMS REPORT
Summary of Care

 Created on:2019



Patient:Joe Archuleta

Sex:Male

:1970

External Reference #:[id number]





Demographics







 Address  [address]



   [place] [address]

 

 Mobile Phone  [phone]

 

 Home Phone  [phone]

 

 Phone  [phone]

 

 Email Address  [email or web address]

 

 Preferred Language  English

 

 Marital Status  

 

 Mormon Affiliation  Unknown

 

 Race  White

 

 Ethnic Group   or 









Author







 Organization  [facility]

 

 Address  [address] [place] [address]









Support







 Name  Relationship  Address  Phone

 

 Chris Archuleta  Unavailable  Unavailable  [phone]









Care Team Providers







 Name  Role  Phone

 

 Veronica Huff MD  Primary Care Provider  [phone]









Reason for Referral

Radiology Services (STAT)





 Status  Reason  Specialty  Diagnoses /  Referred By  Referred To



       Procedures  Contact  Contact

 

 New Request    Diagnostic  Diagnoses



Chest pain, unspecified type  Ion Fernnadez,  



     Radiology  



Procedures



XR CHEST 2 VW  FNP



  



         [address] [place] [address]  



         [address]



  



         Phone:  



         [phone]



  



         Fax: [phone]  





Radiology Services (STAT)





 Status  Reason  Specialty  Diagnoses /  Referred By  Referred To



       Procedures  Contact  Contact

 

 New Request    Diagnostic  Diagnoses



Chest pain, unspecified type  Leandro Fernandezin,  



     Radiology  



Procedures



XR CHEST 2 VW  FNP



  



         [address] [place] [address]  



         [address]



  



         Phone:  



         [phone]



  



         Fax: [phone]  









Reason for Visit







 Reason  Comments

 

 Chest Pain  



Auth/Cert





 Status  Reason  Specialty  Diagnoses /  Referred By  Referred To



       Procedures  Contact  Contact

 

     Emergency Medicine      [facility]



           Dept







           [address] [place] [address]



           [place]



           [address]







           Phone:



           [phone]







           Fax: [phone]









Encounter Details







 Date  Type  Department  Care Team  Description

 

 2019  Emergency  [facility]  Ion Fernandez, FNP



  Chest pain, unspecified type (Primary Dx);



     Department



  [address] [place] [address] [place], [place]  



     [place] [address]



  [id number] [phone] (Fax)  







Allergies

No Known Allergiesdocumented as of this encounter (statuses as of 2019)



Medications







 Medication  Sig  Dispensed  Refills  Start Date  End Date  Status

 

 lactulose 10 gram/15  Take 30 mL by    0      Active



 mL solution  mouth 3          



   (three) times          



   daily as          



   needed for          



   Constipation.          

 

 citalopram 20 mg  Take 20 mg by    0      Active



 tablet  mouth daily.          

 

 Insulin Needles,  Use as    0      Active



 Disposable, 32 gauge  directed          



 x 5/32" Ndle            

 

 glimepiride 1 mg  Take 1 mg by    0      Active



 tablet  mouth daily          



   with          



   breakfast.          

 

 esomeprazole (NEXIUM)  Take 20 mg by  15 capsule  0  2019    Active



 20 mg  mouth daily          



 capsuleIndications:  before a          



 Gastrointestinal  meal.          



 hemorrhage,            



 unspecified            



 gastrointestinal            



 hemorrhage type            

 

 Insulin Glargine  inject 15  3 mL  0  2019    Active



 (LANTUS SOLOSTAR  Units under          



 U-100 INSULIN) 100  the skin at          



 unit/mL (3 mL)  bedtime as          



 injectionIndications:  needed (basal          



 Hyperglycemia  insulin).          

 

 blood sugar  Use as  100 Strip  0  2019    Active



 diagnostic (CONTOUR  directed,          



 NEXT TEST STRIPS)  once a day to          



 stripIndications:  monitor blood          



 Hyperglycemia  glucose for          



   ICD code          



   E11.9          

 

 Insulin Glargine  inject 15    0      Discontinued



 (LANTUS SOLOSTAR  Units under        9  



 U-100 INSULIN) 100  the skin at          



 unit/mL (3 mL)  bedtime as          



 injection  needed.          

 

 blood sugar  Use as  100 Strip  0  2019  Discontinued



 diagnostic (CONTOUR  directed,        9  



 NEXT TEST STRIPS)  once a day to          



 strip  monitor blood          



   glucose for          



   ICD code          



   E11.9          



documented as of this encounter (statuses as of 2019)



Active Problems







 Problem  Noted Date

 

 Hyperglycemia  2019

 

 Alcoholic cirrhosis  2019

 

 Chronic hepatitis C without hepatic coma  2019

 

 Chronic abdominal pain  2019

 

 Anxiety  2019

 

 Diabetes mellitus type 2 with complications, uncontrolled  2019

 

 Hyperammonemia  2019

 

 Obesity (BMI 30-39.9)  2019

 

 Hepatic encephalopathy  2015

 

 S/P TIPS (transjugular intrahepatic portosystemic shunt)  2015

 

 History of CVA (cerebrovascular accident)  2015

 

 History of MI (myocardial infarction)  2015



documented as of this encounter (statuses as of 2019)



Resolved Problems







 Problem  Noted Date  Resolved Date

 

 History of alcoholism  2019

 

 Hospice care patient  2015



documented as of this encounter (statuses as of 2019)



Immunizations







 Name  Administration Dates  Next Due

 

 Influenza Virus Vaccine Quad IM 3+ YRS  2015  

 

 Pneumococcal Polysaccharide, PPSV23 (PNEUMOVAX)  2015  



documented as of this encounter



Social History







 Tobacco Use  Types  Packs/Day  Years Used  Date

 

 Never Smoker        









 Smokeless Tobacco: Never Used      









 Alcohol Use  Drinks/Week  oz/Week  Comments

 

 Not Currently      









 Education  Answer  Date Recorded

 

 What is the highest level of school you have completed or the  9th grade  



 highest degree you have received?    









 Financial Resource Strain  Answer  Date Recorded

 

 How hard is it for you to pay for the very basics like  Somewhat hard  2019



 food, housing, medical care, and heating?    









 Food Insecurity  Answer  Date Recorded

 

 Within the past 12 months, you worried that your food would  Never true  2019



 run out before you got money to buy more.    

 

 Within the past 12 months, the food you bought just didn't  Never true  2019



 last and you didn't have money to get more.    









 Transportation Needs  Answer  Date Recorded

 

 In the past 12 months, has lack of transportation kept you from  No  2019



 medical appointments or from getting medications?    

 

 In the past 12 months, has lack of transportation kept you from  No  2019



 meetings, work, or getting things needed for daily living?    









 Sex Assigned at Birth  Date Recorded

 

 Not on file  









 Job Start Date  Occupation  Industry

 

 Not on file  Not on file  Not on file









 Travel History  Travel Start  Travel End









 No recent travel history available.



documented as of this encounter



Last Filed Vital Signs







 Vital Sign  Reading  Time Taken  Comments

 

 Blood Pressure  116/65  2019  7:00 PM CDT  

 

 Pulse  64  2019  7:00 PM CDT  

 

 Temperature  36.4 C (97.5 F)  2019  5:16 PM CDT  

 

 Respiratory Rate  10  2019  7:00 PM CDT  

 

 Oxygen Saturation  97%  2019  7:00 PM CDT  

 

 Inhaled Oxygen Concentration  -  -  

 

 Weight  104.3 kg (230 lb)  2019  5:16 PM CDT  

 

 Height  185.4 cm (6' 1")  2019  5:16 PM CDT  

 

 Body Mass Index  30.34  2019  5:16 PM CDT  



documented in this encounter



Discharge Instructions

Ion Malhotra, JENI - 2019DIAGNOSIS

1. High blood sugar

Chest pain



NO LIFE-THREATENING FINDINGS ON TODAY'S EXAM.



SPECIAL CARE INSTRUCTIONS:

Stay well hydrated

Follow up with PCP

Return to ER as needed

Monitor Blood sugar at home and take insulin and Diabetes medicine as 
prescribed.



FOLLOW-UP RECOMMENDATIONS:

RECOMMEND FOLLOW-UP WITH A PRIMARY CARE PROVIDER OR SPECIALIST IN 2-5 DAYS, 
ESPECIALLY IF NO IMPROVEMENT IN SYMPTOMS.



TO FOLLOW-UP WITHIN THE [facility], TRY THESE OPTIONS (CLINIC 
APPOINTMENTS AVAILABLE ON CASE-BY-CASE BASIS):

1. SCHEDULE AN APPOINTMENT ONLINE AT WWW.[facility]

2. OR CALL THE [facility] ACCESS CENTER AT [phone] OR [phone]

3. OR CALL YOUR [facility] PHYSICIAN'S OFFICE DIRECTLY IF YOU ARE ALREADY AN 
ESTABLISHED [facility] PATIENT.



OR, YOU MAY FOLLOW-UP WITH A PROVIDER OF YOUR CHOICE, SUCH AS:

1. A PHYSICIAN OF YOUR CHOICE

2. [facility], [phone]. LOCATIONS IN [facility]

3. [facility], [address] [place] [address]; [phone]
[phone]



RETURN TO ER FOR WORSENING OF SYMPTOMS.





AttachmentsThe following attachments cannot be sent through Care 
Everywhere.Chest Pain, Uncertain Cause (English)documented in this encounter



Plan of Treatment







 Health Maintenance  Due Date  Last Done  Comments

 

 EYE EXAM  1981    

 

 LDL-C  1981    

 

 URINE MICROALBUMIN  1981    

 

 FOOT EXAM  1989    

 

 DTaP,Tdap,and Td Vaccines (1 -  1990    



 Tdap)      

 

 INFLUENZA VACCINE (#1)  2019  

 

 HgA1C  2020, 2019  

 

 CREATININE (SERUM)  2020, 2019,  



     2019, Additional history  



     exists  

 

 PNEUMOCOCCAL 0-64 YEARS COMBINED  Completed  2015  



 SERIES      



documented as of this encounter



Procedures







 Procedure Name  Priority  Date/Time  Associated Diagnosis  Comments

 

 POCT GLUCOSE  Routine  2019  7:20    Results for this



 (AUTOMATED)    PM CDT    procedure are in



         the results



         section.

 

 POCT GLUCOSE(AGE  ASAP  2019  7:20  Hyperglycemia  Results for this



 >30DAYS)    PM CDT    procedure are in



         the results



         section.

 

 ACUTE CARE VENOUS  STAT  2019  6:49  Hyperglycemia  Results for this



 BLOOD GAS    PM CDT    procedure are in



         the results



         section.

 

 XR CHEST 2 VW  STAT  2019  5:50  Chest pain,  Results for this



     PM CDT  unspecified type  procedure are in



         the results



         section.

 

 CBC WITH DIFFERENTIAL  STAT  2019  5:35  Chest pain,  Results for this



     PM CDT  unspecified type  procedure are in



         the results



         section.

 

 CBC WITH DIFF  Routine  2019  5:35  Chest pain,  Results for this



     PM CDT  unspecified type  procedure are in



         the results



         section.

 

 COMP. METABOLIC PANEL  STAT  2019  5:35  Chest pain,  Results for this



 ([address])    PM CDT  unspecified type  procedure are in



         the results



         section.

 

 TROPONIN I  STAT  2019  5:35  Chest pain,  Results for this



     PM CDT  unspecified type  procedure are in



         the results



         section.

 

 MAGNESIUM  STAT  2019  5:35  Chest pain,  Results for this



     PM CDT  unspecified type  procedure are in



         the results



         section.

 

 EKG-12 LEAD  ASAP  2019  5:20    



     PM CDT    

 

 NOTICE OF PRIVACY  Routine  2019  5:09    



 PRACTICES    PM CDT    



documented in this encounter



Results

POCT GLUCOSE (AUTOMATED) (2019  7:20 PM CDT)





 Component  Value  Ref Range  Performed At  Pathologist Signature

 

 POCT GLU  375 (H)  70 - 110 mg/dL  [facility]  



       LABORATORY  









 Specimen

 

 Blood









 Performing Organization  Address  City/State/Zipcode  Phone Number

 

 [facility]  CLIA: [id number], [address]  [place] [address]  



 LABORATORY  [address]    



POCT GLUCOSE(AGE &gt;30DAYS) (2019  7:20 PM CDT)





 Component  Value  Ref Range  Performed At  Pathologist Signature

 

 POCT Glu (age>30days)  375 (A)  70 - 110 mg/dL    









 Specimen

 

 Blood - [facility]



ACUTE CARE VENOUS BLOOD GAS (2019  6:49 PM CDT)





 Component  Value  Ref Range  Performed At  Pathologist Signature

 

 PH  7.46 (H)  7.32 - 7.42  [facility]  



       LABORATORY  

 

 PCO2 CHEMA  29 (L)  41 - 51 mmHg  [facility]  



       LABORATORY  

 

 PO2 CHEMA  110 (HH)  25 - 40 mmHg  [facility]  



       LABORATORY  

 

 HCO3 CHEMA  20 (L)  24 - 28 mEq/L  [facility]  



       LABORATORY  

 

 AC VBE(BEAKER)  -2.4  mEq/L  [facility]  



       LABORATORY  









 Specimen

 

 Blood - VENOUS









 Performing Organization  Address  City/[facility]/Zipcode  Phone Number

 

 [facility]  CLIA: [id number], [address]  [place] [address]  



 LABORATORY  [address]    



XR CHEST 2 VW (2019  5:50 PM CDT)





 Specimen

 

 









 Impressions  Performed At

 

  



  [facility]//DOSE



 No acute cardiopulmonary abnormality.



  



  



  



 Bee HILL MD., have reviewed this study and agree with  



 the



  



 above report.  









 Narrative  Performed At

 

 EXAM: XR CHEST 2 VW



  PACS/VR/DOSE



  



  



 HISTORY: chest pain 



  



  



  



 COMPARISON: Chest x-ray 2019



  



  



  



 FINDINGS:



  



  



  



 The lungs are clear. No focal consolidation, pleural effusion or



  



 pneumothorax is seen. 



  



  



  



 The cardiac silhouette is normal in size. The main pulmonary arteries  



 are



  



 enlarged, unchanged.



  



  



  



 No acute bony abnormality. Multiple old right anterolateral rib  



 fractures.



  



  



  



 Embolization coils project over the midline in the upper abdomen. A TIPS



  



 shunt is in place.



  



   









 Procedure Note

 

 [facility], Radiant Results Inft User - 2019  5:58 PM CDT



EXAM: XR CHEST 2 VW



 



 HISTORY: chest pain



 



 COMPARISON: Chest x-ray 2019



 



 FINDINGS:



 



 The lungs are clear. No focal consolidation, pleural effusion or



 pneumothorax is seen.



 



 The cardiac silhouette is normal in size. The main pulmonary arteries are



 enlarged, unchanged.



 



 No acute bony abnormality. Multiple old right anterolateral rib fractures.



 



 Embolization coils project over the midline in the upper abdomen. A TIPS



 shunt is in place.



 



 IMPRESSION



 



 No acute cardiopulmonary abnormality.



 



 Bee HILL MD., have reviewed this study and agree with the



 above report.









 Performing Organization  Address  City/[facility]/Zipcode  Phone Number

 

 [facility]//DOSE      



CBC WITH DIFFERENTIAL (2019  5:35 PM CDT)





 Component  Value  Ref Range  Performed At  Pathologist



         Signature

 

 WBC  2.28 (L)Comment:  4.20 - 10.70  [facility]  



   consistent with  10*3/L  HOSPITAL  



   patient history    LABORATORY  

 

 RBC  4.26  4.26 - 5.52  [facility]  



     10*6/L  [facility]  



       LABORATORY  

 

 HGB  13.2  12.2 - 16.4  [facility]  



     g/dL  HOSPITAL  



       LABORATORY  

 

 HCT  38.4  38.4 - 49.3 %  [facility]  



       LABORATORY  

 

 MCV  90.1  81.7 - 95.6  [facility]  



     fL  HOSPITAL  



       LABORATORY  

 

 MCH  31.0  26.1 - 32.7  [facility]  



       LABORATORY  

 

 MCHC  34.4  31.2 - 35.0  [facility]  



     g/dL  [facility]  



       LABORATORY  

 

 RDW-SD  46.8  38.5 - 51.6  [facility]  



       LABORATORY  

 

 RDW-CV  14.1  12.1 - 15.4 %  [facility]  



       LABORATORY  

 

 PLT  68 (L)Comment:  150 - 328  [facility]  



   consistent with  10*3/L  HOSPITAL  



   patient history    LABORATORY  

 

 MPV  12.0  9.8 - 13.0 fL  [facility]  



       LABORATORY  

 

 IPF %  3.2Comment: Platelet  1.2 - 10.7 %  [facility]  



   count measured by    HOSPITAL  



   fluorescence method.    LABORATORY  

 

 NRBC/100 WBC  0.0  0.0 - 10.0  [facility]  



     /100 WBCs  [facility]  



       LABORATORY  

 

 NRBC x10^3  <0.01  10*3/L  [facility]  



       LABORATORY  

 

 GRAN MAT (NEUT) %  52.7  %  [facility]  



       LABORATORY  

 

 IMM GRAN %  0.40  %  [facility]  



       LABORATORY  

 

 LYMPH %  29.8  %  [facility]  



       LABORATORY  

 

 MONO %  12.3  %  [facility]  



       LABORATORY  

 

 EOS %  3.9  %  [facility]  



       LABORATORY  

 

 BASO %  0.9  %  [facility]  



       LABORATORY  

 

 GRAN MAT  1.20 (L)  1.99 - 6.95  [facility]  



 x10^3(ANC)    10*3/uL  [facility]  



       LABORATORY  

 

 IMM GRAN x10^3  <0.03  0.00 - 0.06  [facility]  



     10*3/uL  [facility]  



       LABORATORY  

 

 LYMPH x10^3  0.68 (L)  1.09 - 3.23  [facility]  



     10*3/uL  [facility]  



       LABORATORY  

 

 MONO x10^3  0.28 (L)  0.36 - 1.02  [facility]  



     10*3/uL  [facility]  



       LABORATORY  

 

 EOS x10^3  0.09  0.06 - 0.53  [facility]  



     10*3/uL  [facility]  



       LABORATORY  

 

 BASO x10^3  <0.03  0.01 - 0.09  [facility]  



     10*3/uL  [facility]  



       LABORATORY  









 Specimen

 

 Blood - VENOUS









 Performing Organization  Address  City/State/Zipcode  Phone Number

 

 [facility]  CLIA: [id number], [address]  [place] [address]  



 LABORATORY  [address]    



TROPONIN I (2019  5:35 PM CDT)





 Component  Value  Ref Range  Performed At  Pathologist Signature

 

 TROPONIN I  <0.012  <=0.034 ng/mL  [facility]  



       LABORATORY  









 Specimen

 

 Blood - VENOUS









 Narrative  Performed At

 

 Equal or Less than 0.034 ng/ml---Normal



  [facility] LABORATORY



 Note: Cardiac troponin begins to rise 3-4 hours  



 after the onset of ischemia. Repeat in 4-6 hours  



 if the sample was drawn within 3-4 hours of the  



 onset of the symptom and found normal. 



  



  



  



 Between 0.035 and 0.120 ng/mL--- Borderline.  



 Questionable myocardial injury or  



 necrosis



  



 Note: Serial measurement may be necessary to  



 confirm or exclude the diagnosis of myocardial  



 injury or necrosis; Clinical correlation  



 (symptoms, EKGs, imaging studies, and others)  



 required; Repeat in 4-6 hours if clinically  



 indicated.



  



  



  



 Equal or Higher than 0.121 ng/mL---Abnormal.  



 Myocardial Injury or Necrosis  



 Likely 



  



 Biotin has been reported to cause a negative  



 bias, interpret results relative to patient's  



 use of  



 biotin.  



  



  



   



   



   









 Performing Organization  Address  City/State/[facility]  Phone Number

 

 [facility]  CLIA: [id number], [address]  [place] [address]  



 LABORATORY  [address]    



MAGNESIUM (2019  5:35 PM CDT)





 Component  Value  Ref Range  Performed At  Pathologist Signature

 

 MAGNESIUM  1.8  1.7 - 2.4 mg/dL  [facility]  



       LABORATORY  









 Specimen

 

 Blood - VENOUS









 Performing Organization  Address  City/[facility]ode  Phone Number

 

 [facility]  CLIA: [id number], [address]  [place] [address]  



 LABORATORY  [address]    



COMP. METABOLIC PANEL (90369) (2019  5:35 PM CDT)





 Component  Value  Ref Range  Performed At  Pathologist



         [facility]

 

 NA  137  135 - 145  [facility]  



     mmol/L  [facility] LABORATORY  

 

 K  4.3  3.5 - 5.0  [facility]  



     mmol/L  [facility] LABORATORY  

 

 CL  106  98 - 108 mmol/L  [facility] LABORATORY  

 

 CO2 TOTAL  22 (L)  23 - 31 mmol/L  [facility] LABORATORY  

 

 AGAP  9  2 - 16  [facility] LABORATORY  

 

 BUN  9  7 - 23 mg/dL  [facility] LABORATORY  

 

 GLUCOSE  579 (HH)  70 - 110 mg/dL  [facility] LABORATORY  

 

 CREATININE  0.55 (L)  0.60 - 1.25  [facility]  



     mg/dL  [facility] LABORATORY  

 

 TOTAL BILI  1.6 (H)  0.1 - 1.1 mg/dL  [facility] LABORATORY  

 

 CALCIUM  8.4 (L)  8.6 - 10.6  [facility]  



     mg/dL  [facility] LABORATORY  

 

 T PROTEIN  6.4  6.3 - 8.2 g/dL  [facility] LABORATORY  

 

 ALBUMIN  2.9 (L)  3.5 - 5.0 g/dL  [facility] LABORATORY  

 

 ALK PHOS  155 (H)  34 - 122 U/L  [facility] LABORATORY  

 

 ALT(SGPT)  70 (H)  9 - 51 U/L  [facility] LABORATORY  

 

 AST(SGOT)  79 (H)  13 - 40 U/L  [facility] LABORATORY  

 

 eGFR Calculation  159.0  mL/min/1.73m2  [facility]  



 ([facility]  



 American)        

 

 eGFR Calculation  192.7  mL/min/1.73m2  [facility]  



 ()      [facility] LABORATORY  









 Specimen

 

 Blood - VENOUS









 Narrative  Performed At

 

 Association of Glomerular Filtration Rate (GFR)  [facility] 
LABORATORY



 and Staging of Kidney Disease*



  



 +-----------------------+---------------------+-  



 ------------------------+



  



 | GFR (mL/min/1.73 m2)| With Kidney  



 Damage|Without Kidney Damage



  



 +-----------------------+---------------------+-  



 ------------------------+



  



 |>90|  



 Stage one|  



 Normal



  



 +-----------------------+---------------------+-  



 ------------------------+



  



 |60-89|S  



 tage two| Decreased  



 GFR 



  



 +-----------------------+---------------------+-  



 ------------------------+



  



 |30-59|S  



 tage three| Stage  



 three 



  



 +-----------------------+---------------------+-  



 ------------------------+



  



 |15-29|S  



 tage four | Stage  



 four



  



 +-----------------------+---------------------+-  



 ------------------------+



  



 |<15 (or dialysis)|Stage  



 five | Stage  



 five



  



 +-----------------------+---------------------+-  



 ------------------------+



  



 *Each stage assumes the associated GFR level has  



 been in effect for at least three  



 months.Stages 1 to 5, with or without kidney  



 disease, indicate chronic kidney disease.



  



 Notes: Determination of stages one and two (with  



 eGFR >59mL/min/1.73 m2) requires estimation of  



 kidney damage for at least three months as  



 defined by structural or functional  



 abnormalities of the kidney, manifested by  



 either:



  



 Pathological abnormalities or Markers of kidney  



 damage (including abnormalities in the  



 composition of the blood or urine or  



 abnormalities in imaging tests).  









 Performing Organization  Address  City/State/Zipcode  Phone Number

 

 [facility]  CLIA: [id number], [address]  [place] [address]  



 LABORATORY  [address]    



documented in this encounter



Visit Diagnoses







 Diagnosis

 

 Chest pain, unspecified type - Primary

 

 Hyperglycemia







 Other abnormal glucose



documented in this encounter



Administered Medications







 Medication Order  MAR Action  Action Date  Dose  Rate  Site

 

 insulin regular human (HUMULIN  Given  2019  6:46 PM CDT  10 Units    



 R) injection 10 Units



          



 10 Units, Slow IV Push, ONCE, 1          



 dose, Sun 19 at 1930, ASAP          









   



documented in this encounter



Insurance







 Payer  Benefit Plan /  Subscriber ID  Effective Dates  Phone  Address  Type



   Group          

 

 [employer]  xxxxxxxxx  2019-Present  [phone]  [address]  [place]icaid [place]        [id number] [place] [address]  



           [address]  









 Guarantor Name  Account Type  Relation to  Date of Birth  Phone  Billing



     Patient      Address

 

 Joe Archuleta  Personal/Family  Self  1970  [phone]  [address]







         (Home)  [place]



           [address]



documented as of this encounter

## 2019-09-02 NOTE — XMS REPORT
Summary of Care

 Created on:2019



Patient:Joe Archuleta

Sex:Male

:1970

External Reference #:[id number]





Demographics







 Address  [address] [place] [address]

 

 Mobile Phone  [phone]

 

 Home Phone  [phone]

 

 Phone  [phone]

 

 Email Address  [email or web address]

 

 Preferred Language  English

 

 Marital Status  

 

 Christian Affiliation  Unknown

 

 Race  White

 

 Ethnic Group   or 









Author







 Organization  [facility]

 

 Address  [address] [place] [address]









Support







 Name  Relationship  Address  Phone

 

 Chris Archuleta  Unavailable  Unavailable  [phone]

 

 Mandie Archuleta  Unavailable  Unavailable  [phone]









Care Team Providers







 Name  Role  Phone

 

 Veronica Huff MD  Primary Care Provider  [phone]









Reason for Visit







 Reason  Comments

 

 LAB WORK  



Auth/Cert





 Status  Reason  Specialty  Diagnoses /  Referred By  Referred To



       Procedures  Contact  Contact

 

     Clinical Medical  Diagnoses



Chronic viral hepatitis C



Unspecified cirrhosis of liver







    [facility]







     Laboratory  



Procedures



CHG HEPATIC FUNCTION PANEL



CHG COMPLETE CBC & AUTO DIFF WBC    [address] [place] [address] [place]



           [address]







           Phone:



           [phone]







           Fax:



           [phone]









Encounter Details







 Date  Type  Department  Care Team  Description

 

 2019  Technician Visit  [facility]  Elvira Luna, [facility]



[address] [place] [address] [phone] (Fax)  Chronic hepatitis C with hepatic coma (Primary Dx);



     Phlebotomy  



1, [facility]  Hepatic cirrhosis, unspecified hepatic cirrhosis type, unspecified 
whether ascites present



     Lab-[address] [place]    



     [address] [phone]    







Allergies

No Known Allergiesdocumented as of this encounter (statuses as of 2019)



Medications







 Medication  Sig  Dispensed  Refills  Start Date  End Date  Status

 

 lactulose 10 gram/15 mL  Take 30 mL by    0      Active



 solution  mouth 3 (three)          



   times daily as          



   needed for          



   Constipation.          

 

 citalopram 20 mg tablet  Take 20 mg by    0      Active



   mouth daily.          

 

 Insulin Needles,  Use as directed    0      Active



 Disposable, 32 gauge x            



 5/32" Ndle            

 

 glimepiride 1 mg tablet  Take 1 mg by    0      Active



   mouth daily          



   with breakfast.          

 

 esomeprazole (NEXIUM) 20  Take 20 mg by  15 capsule  0  2019    Active



 mg capsuleIndications:  mouth daily          



 Gastrointestinal  before a meal.          



 hemorrhage, unspecified            



 gastrointestinal            



 hemorrhage type            

 

 Insulin Glargine (LANTUS  inject 15 Units  3 mL  0  2019    Active



 SOLOSTAR U-100 INSULIN)  under the skin          



 100 unit/mL (3 mL)  at bedtime as          



 injectionIndications:  needed (basal          



 Hyperglycemia  insulin).          

 

 blood sugar diagnostic  Use as  100 Strip  0  2019    Active



 (CONTOUR NEXT TEST  directed, once          



 STRIPS)  a day to          



 stripIndications:  monitor blood          



 Hyperglycemia  glucose for ICD          



   code E11.9          



documented as of this encounter (statuses as of 2019)



Active Problems







 Problem  Noted Date

 

 Hyperglycemia  2019

 

 Alcoholic cirrhosis  2019

 

 Chronic hepatitis C without hepatic coma  2019

 

 Chronic abdominal pain  2019

 

 Anxiety  2019

 

 Diabetes mellitus type 2 with complications, uncontrolled  2019

 

 Hyperammonemia  2019

 

 Obesity (BMI 30-39.9)  2019

 

 Hepatic encephalopathy  2015

 

 S/P TIPS (transjugular intrahepatic portosystemic shunt)  2015

 

 History of CVA (cerebrovascular accident)  2015

 

 History of MI (myocardial infarction)  2015



documented as of this encounter (statuses as of 2019)



Resolved Problems







 Problem  Noted Date  Resolved Date

 

 History of alcoholism  2019

 

 Hospice care patient  2015



documented as of this encounter (statuses as of 2019)



Immunizations







 Name  Administration Dates  Next Due

 

 Influenza Virus Vaccine Quad IM 3+ YRS  2015  

 

 Pneumococcal Polysaccharide, PPSV23 (PNEUMOVAX)  2015  



documented as of this encounter



Social History







 Tobacco Use  Types  Packs/Day  Years Used  Date

 

 Never Smoker        









 Smokeless Tobacco: Never Used      









 Alcohol Use  Drinks/Week  oz/Week  Comments

 

 Not Currently      









 Education  Answer  Date Recorded

 

 What is the highest level of school you have completed or the  9th grade  



 highest degree you have received?    









 Financial Resource Strain  Answer  Date Recorded

 

 How hard is it for you to pay for the very basics like  Somewhat hard  2019



 food, housing, medical care, and heating?    









 Food Insecurity  Answer  Date Recorded

 

 Within the past 12 months, you worried that your food would  Never true  2019



 run out before you got money to buy more.    

 

 Within the past 12 months, the food you bought just didn't  Never true  2019



 last and you didn't have money to get more.    









 Transportation Needs  Answer  Date Recorded

 

 In the past 12 months, has lack of transportation kept you from  No  2019



 medical appointments or from getting medications?    

 

 In the past 12 months, has lack of transportation kept you from  No  2019



 meetings, work, or getting things needed for daily living?    









 Sex Assigned at Birth  Date Recorded

 

 Not on file  









 Job Start Date  Occupation  Industry

 

 Not on file  Not on file  Not on file









 Travel History  Travel Start  Travel End









 No recent travel history available.



documented as of this encounter



Last Filed Vital Signs

Not on filedocumented in this encounter



Plan of Treatment







 Date  Type  Specialty  Care Team  Description

 

 2019  Office Visit  Cardiology  Michael Wright MD



  



       [address] 



  



       [address] [address] [phone] (Fax)  









 Name  Type  Priority  Associated Diagnoses  Date/Time

 

 HEPATIC FUNCTION PANEL (740[address])  LAB  Routine  Chronic hepatitis C  2019  
9:43 AM



 (ALB,T.PRO,BILI      with hepatic coma



  CDT



 T,BU/BC,ALT,AST,ALK PHOS)      Hepatic cirrhosis,  



       unspecified hepatic  



       cirrhosis type,  



       unspecified whether  



       ascites present  

 

 PROTHROMBIN TIME / INR  LAB  Routine  Chronic hepatitis C  2019  9:43 AM



       with hepatic coma



  CDT



       Hepatic cirrhosis,  



       unspecified hepatic  



       cirrhosis type,  



       unspecified whether  



       ascites present  

 

 aPTT  LAB  Routine  Chronic hepatitis C  2019  9:43 AM



       with hepatic coma



  CDT



       Hepatic cirrhosis,  



       unspecified hepatic  



       cirrhosis type,  



       unspecified whether  



       ascites present  

 

 HEPATITIS C VIRUS GENOTYPE  LAB  Routine  Chronic hepatitis C  2019  9:
43 AM



       with hepatic coma



  CDT



       Hepatic cirrhosis,  



       unspecified hepatic  



       cirrhosis type,  



       unspecified whether  



       ascites present  

 

 ALPHA FETOPROTEIN  LAB  Routine  Chronic hepatitis C  2019  9:43 AM



       with hepatic coma



  CDT



       Hepatic cirrhosis,  



       unspecified hepatic  



       cirrhosis type,  



       unspecified whether  



       ascites present  

 

 ANTI-NUCLEAR ANTIBODY SCREEN  LAB  Routine  Chronic hepatitis C  2019  9:
43 AM



       with hepatic coma



  CDT



       Hepatic cirrhosis,  



       unspecified hepatic  



       cirrhosis type,  



       unspecified whether  



       ascites present  

 

 SMOOTH MUSCLE AB,IGG W/REFLEX  LAB  Routine  Chronic hepatitis C  2019  9
:43 AM



       with hepatic coma



  CDT



       Hepatic cirrhosis,  



       unspecified hepatic  



       cirrhosis type,  



       unspecified whether  



       ascites present  

 

 CERULOPLASMIN  LAB  Routine  Chronic hepatitis C  2019  9:43 AM



       with hepatic coma



  CDT



       Hepatic cirrhosis,  



       unspecified hepatic  



       cirrhosis type,  



       unspecified whether  



       ascites present  

 

 FERRITIN SERUM  LAB  Routine  Chronic hepatitis C  2019  9:43 AM



       with hepatic coma



  CDT



       Hepatic cirrhosis,  



       unspecified hepatic  



       cirrhosis type,  



       unspecified whether  



       ascites present  

 

 IRON PANEL  LAB  Routine  Chronic hepatitis C  2019  9:43 AM



       with hepatic coma



  CDT



       Hepatic cirrhosis,  



       unspecified hepatic  



       cirrhosis type,  



       unspecified whether  



       ascites present  

 

 HAV ANTIBODY (IGG AND IGM)  LAB  Routine  Chronic hepatitis C  2019  9:
43 AM



       with hepatic coma



  CDT



       Hepatic cirrhosis,  



       unspecified hepatic  



       cirrhosis type,  



       unspecified whether  



       ascites present  

 

 HEPATITIS B SURFACE ANTIGEN  LAB  Routine  Chronic hepatitis C  2019  9:
43 AM



       with hepatic coma



  CDT



       Hepatic cirrhosis,  



       unspecified hepatic  



       cirrhosis type,  



       unspecified whether  



       ascites present  

 

 HEPATITIS B SURFACE ANTIBODY  LAB  Routine  Chronic hepatitis C  2019  9:
43 AM



       with hepatic coma



  CDT



       Hepatic cirrhosis,  



       unspecified hepatic  



       cirrhosis type,  



       unspecified whether  



       ascites present  

 

 HEPATITIS B CORE ANTIBODY IGM  LAB  Routine  Chronic hepatitis C  2019  9
:43 AM



       with hepatic coma



  CDT



       Hepatic cirrhosis,  



       unspecified hepatic  



       cirrhosis type,  



       unspecified whether  



       ascites present  

 

 HCV ANTIBODY  LAB  Routine  Chronic hepatitis C  2019  9:43 AM



       with hepatic coma



  CDT



       Hepatic cirrhosis,  



       unspecified hepatic  



       cirrhosis type,  



       unspecified whether  



       ascites present  

 

 GAMMA GLUTAMYLTRANSFERASE  LAB  Routine  Chronic hepatitis C  2019  9:43 
AM



       with hepatic coma



  CDT



       Hepatic cirrhosis,  



       unspecified hepatic  



       cirrhosis type,  



       unspecified whether  



       ascites present  

 

 ALPHA 1 ANTITRYPSIN  LAB  Routine  Chronic hepatitis C  2019  9:43 AM



       with hepatic coma



  CDT



       Hepatic cirrhosis,  



       unspecified hepatic  



       cirrhosis type,  



       unspecified whether  



       ascites present  

 

 MITOCHONDRIAL M2 AB, IGG  LAB  Routine  Chronic hepatitis C  2019  9:43 
AM



       with hepatic coma



  CDT



       Hepatic cirrhosis,  



       unspecified hepatic  



       cirrhosis type,  



       unspecified whether  



       ascites present  

 

 BASIC METABOLIC PANEL (NA, K,  LAB  Routine  Chronic hepatitis C  2019  9
:43 AM



 CL, CO2, GLUCOSE, BUN,      with hepatic coma



  CDT



 CREATININE, CA)      Hepatic cirrhosis,  



       unspecified hepatic  



       cirrhosis type,  



       unspecified whether  



       ascites present  

 

 HCV BY PCR  LAB  Routine  Chronic hepatitis C  2019  9:43 AM



       with hepatic coma



  CDT



       Hepatic cirrhosis,  



       unspecified hepatic  



       cirrhosis type,  



       unspecified whether  



       ascites present  









 Name  Type  Priority  Associated Diagnoses  Order Schedule

 

 ADC / LCC - DRUG SCREEN  LAB  Routine  Chronic hepatitis C with  Ordered: 



 TRIAGE      hepatic coma



  



       Hepatic cirrhosis,  



       unspecified hepatic  



       cirrhosis type, unspecified  



       whether ascites present  









 Health Maintenance  Due Date  Last Done  Comments

 

 EYE EXAM  1981    

 

 LDL-C  1981    

 

 URINE MICROALBUMIN  1981    

 

 FOOT EXAM  1989    

 

 DTaP,Tdap,and Td Vaccines (1 -  1990    



 Tdap)      

 

 INFLUENZA VACCINE (#1)  2019  

 

 HgA1C  2020, 2019  

 

 CREATININE (SERUM)  2020, 2019,  



     2019, Additional history  



     exists  

 

 PNEUMOCOCCAL 0-64 YEARS COMBINED  Completed  2015  



 SERIES      



documented as of this encounter



Procedures







 Procedure Name  Priority  Date/Time  Associated Diagnosis  Comments

 

 CBC WITH DIFFERENTIAL  Routine  2019  9:43  Chronic hepatitis C  Results 
for this



     AM CDT  with hepatic coma



  procedure are in



       Hepatic cirrhosis,  the results



       unspecified hepatic  section.



       cirrhosis type,  



       unspecified whether  



       ascites present  

 

 CBC WITH DIFF  Routine  2019  9:43  Chronic hepatitis C  Results for this



     AM CDT  with hepatic coma



  procedure are in



       Hepatic cirrhosis,  the results



       unspecified hepatic  section.



       cirrhosis type,  



       unspecified whether  



       ascites present  



documented in this encounter



Results

CBC WITH DIFFERENTIAL (2019  9:43 AM CDT)





 Component  Value  Ref Range  Performed At  Pathologist



         Signature

 

 WBC  2.68 (L)  4.20 - 10.70  [facility]  



     10*3/L  [facility]  



       LABORATORY  

 

 RBC  4.26  4.26 - 5.52  [facility]  



     10*6/L  [facility]  



       LABORATORY  

 

 HGB  13.2  12.2 - 16.4  [facility]  



     g/dL  [facility]  



       LABORATORY  

 

 HCT  38.5  38.4 - 49.3 %  [facility]  



       LABORATORY  

 

 MCV  90.4  81.7 - 95.6  [facility]  



       LABORATORY  

 

 MCH  31.0  26.1 - 32.7  [facility]  



       LABORATORY  

 

 MCHC  34.3  31.2 - 35.0  [facility]  



     g/dL  [facility]  



       LABORATORY  

 

 RDW-SD  45.1  38.5 - 51.6  [facility]  



       LABORATORY  

 

 RDW-CV  13.8  12.1 - 15.4 %  [facility]  



       LABORATORY  

 

 PLT  77 (L)  150 - 328  [facility]  



     10*3/L  [facility]  



       LABORATORY  

 

 MPV  11.7  9.8 - 13.0 fL  [facility]  



       LABORATORY  

 

 IPF %  3.7Comment: Platelet  1.2 - 10.7 %  [facility]  



   count measured by    HOSPITAL  



   fluorescence method.    LABORATORY  

 

 NRBC/100 WBC  0.0  0.0 - 10.0  [facility]  



     /100 WBCs  [facility]  



       LABORATORY  

 

 NRBC x10^3  <0.01  10*3/L  [facility]  



       LABORATORY  

 

 GRAN MAT (NEUT) %  46.6  %  [facility]  



       LABORATORY  

 

 IMM GRAN %  0.40  %  [facility]  



       LABORATORY  

 

 LYMPH %  29.9  %  [facility]  



       LABORATORY  

 

 MONO %  16.4  %  [facility]  



       LABORATORY  

 

 EOS %  5.6  %  [facility]  



       LABORATORY  

 

 BASO %  1.1  %  [facility]  



       LABORATORY  

 

 GRAN MAT  1.25 (L)  1.99 - 6.95  [facility]  



 x10^3(ANC)    10*3/uL  [facility]  



       LABORATORY  

 

 IMM GRAN x10^3  <0.03  0.00 - 0.06  [facility]  



     10*3/uL  [facility]  



       LABORATORY  

 

 LYMPH x10^3  0.80 (L)  1.09 - 3.23  [facility]  



     10*3/uL  [facility]  



       LABORATORY  

 

 MONO x10^3  0.44  0.36 - 1.02  [facility]  



     10*3/uL  [facility]  



       LABORATORY  

 

 EOS x10^3  0.15  0.06 - 0.53  [facility]  



     10*3/uL  [facility]  



       LABORATORY  

 

 BASO x10^3  0.03  0.01 - 0.09  [facility]  



     10*3/uL  [facility]  



       LABORATORY  









 Specimen

 

 Blood - ARM, LEFT









 Performing Organization  Address  City/State/Zipcode  Phone Number

 

 [facility]  CLIA: [id number], [address]  [place] [address]  



 LABORATORY  [address]    



documented in this encounter



Visit Diagnoses







 Diagnosis

 

 Chronic hepatitis C with hepatic coma - Primary

 

 Hepatic cirrhosis, unspecified hepatic cirrhosis type, unspecified whether 
ascites



 present



documented in this encounter



Insurance







 Payer  Benefit Plan /  Subscriber ID  Effective Dates  Phone  Address  Type



   Group          

 

 [employer] [place] OF  xxxxxxxxx  2019-Present  [phone]  [address]  [place]icaid [place]        [id number]



  



           [place]  



           [address]  









 Guarantor Name  Account Type  Relation to  Date of Birth  Phone  Billing



     Patient      Address

 

 Joe Archuleta  Personal/Family  Self  1970  [phone]  [address]







         (Home)  [facility] [address]







           [place]



           [address]



documented as of this encounter

## 2019-09-02 NOTE — XMS REPORT
Summary of Care

 Created on:2019



Patient:Joe Archuleta

Sex:Male

:1970

External Reference #:[id number]





Demographics







 Address  [address]



   [place] [address]

 

 Mobile Phone  [phone]

 

 Home Phone  [phone]

 

 Phone  [phone]

 

 Preferred Language  English

 

 Marital Status  

 

 Yazidism Affiliation  Unknown

 

 Race  White

 

 Ethnic Group   or 









Author







 Organization  [facility]

 

 Address  [address] [place] [address]









Support







 Name  Relationship  Address  Phone

 

 Mandie Blair  Unavailable  [address]  [phone]



     [place] [address]  









Care Team Providers







 Name  Role  Phone

 

 Veronica Huff MD  Primary Care Provider  [phone]









Reason for Visit







 Reason  Comments

 

 Orders  







Encounter Details







 Date  Type  Department  Care Team  Description

 

 2019  Telephone  [facility]  Veronica Huff MD



  Orders



     - [address]



  [place] [address]



  



     [place] [address] [phone] (Fax)  







Allergies

No Known Allergiesdocumented as of this encounter (statuses as of 2019)



Medications







 Medication  Sig  Dispensed  Refills  Start Date  End Date  Status

 

 furosemide 40 mg  TAKE 1 TABLET BY    0  2019    Active



 tablet  MOUTH ONCE DAILY          

 

 spironolactone 100 mg  TAKE 1 TABLET BY    1  2019    Active



 tablet  MOUTH ONCE DAILY          

 

 Insulin Glargine  inject 15 Units  6 mL  2019    Active



 (LANTUS SOLOSTAR  under the skin at          



 U-100 INSULIN) 100  bedtime.          



 unit/mL (3 mL)            



 injectionIndications:            



 Diabetes mellitus            



 type 2 with            



 complications,            



 uncontrolled            

 

 Insulin Needles,  Use as directed  100 Each  2019    Active



 Disposable, (PEN  daily to inject          



 NEEDLE) 32 gauge x  insulin          



 "            



 NdleIndications:            



 Diabetes mellitus            



 type 2 with            



 complications,            



 uncontrolled            

 

 glimepiride 1 mg  Take 1 tablet by  30 tablet  2019    Active



 tabletIndications:  mouth daily with          



 Diabetes mellitus  breakfast.          



 type 2 with            



 complications,            



 uncontrolled            

 

 pantoprazole 40 mg EC  Take 1 tablet by    0  2019    Active



 tablet  mouth daily.          

 

 lactulose 10 gram/15  Take 30 mL by mouth  2700 mL  1  2019    Active



 mL  3 (three) times          



 solutionIndications:  daily as needed for          



 Hepatic  Constipation or          



 encephalopathy  Encephalophathy.          

 

 ENEMA BAG  Use as directed  1 Each  5  2019    Active



 MiscIndications:            



 Hepatic            



 encephalopathy,            



 Hyperammonemia            

 

 citalopram 20 mg  Take 1 tablet by  30 tablet  2  2019    Active



 tabletIndications:  mouth daily.          



 Anxiety            



documented as of this encounter (statuses as of 2019)



Active Problems







 Problem  Noted Date

 

 Alcoholic cirrhosis  2019

 

 Chronic hepatitis C without hepatic coma  2019

 

 Chronic abdominal pain  2019

 

 Anxiety  2019

 

 Diabetes mellitus type 2 with complications, uncontrolled  2019

 

 Hyperammonemia  2019

 

 Obesity (BMI 30-39.9)  2019

 

 Hepatic encephalopathy  2015

 

 S/P TIPS (transjugular intrahepatic portosystemic shunt)  2015

 

 History of CVA (cerebrovascular accident)  2015

 

 History of MI (myocardial infarction)  2015



documented as of this encounter (statuses as of 2019)



Resolved Problems







 Problem  Noted Date  Resolved Date

 

 History of alcoholism  2019

 

 Hospice care patient  2015



documented as of this encounter (statuses as of 2019)



Immunizations







 Name  Administration Dates  Next Due

 

 Influenza Virus Vaccine Quad IM 3+ YRS  2015  

 

 Pneumococcal Polysaccharide, PPSV23 (PNEUMOVAX)  2015  



documented as of this encounter



Social History







 Tobacco Use  Types  Packs/Day  Years Used  Date

 

 Never Smoker        









 Smokeless Tobacco: Never Used      









 Alcohol Use  Drinks/Week  oz/Week  Comments

 

 Not Currently      









 Education  Answer  Date Recorded

 

 What is the highest level of school you have completed or the  9th grade  



 highest degree you have received?    









 Financial Resource Strain  Answer  Date Recorded

 

 How hard is it for you to pay for the very basics like  Somewhat hard  2019



 food, housing, medical care, and heating?    









 Food Insecurity  Answer  Date Recorded

 

 Within the past 12 months, you worried that your food would  Never true  2019



 run out before you got money to buy more.    

 

 Within the past 12 months, the food you bought just didn't  Never true  2019



 last and you didn't have money to get more.    









 Transportation Needs  Answer  Date Recorded

 

 In the past 12 months, has lack of transportation kept you from  No  2019



 medical appointments or from getting medications?    

 

 In the past 12 months, has lack of transportation kept you from  No  2019



 meetings, work, or getting things needed for daily living?    









 Sex Assigned at Birth  Date Recorded

 

 Not on file  









 Job Start Date  Occupation  Industry

 

 Not on file  Not on file  Not on file









 Travel History  Travel Start  Travel End









 No recent travel history available.



documented as of this encounter



Last Filed Vital Signs

Not on filedocumented in this encounter



Plan of Treatment







 Date  Type  Specialty  Care Team  Description

 

 2019  Office Visit  Family Medicine  Veronica Huff MD



  



       [address] [place] [address] [place] [address] [phone] (Fax)  









 Health Maintenance  Due Date  Last Done  Comments

 

 EYE EXAM  1981    

 

 LDL-C  1981    

 

 URINE MICROALBUMIN  1981    

 

 FOOT EXAM  1989    

 

 DTaP,Tdap,and Td Vaccines (1 -  1990    



 Tdap)      

 

 INFLUENZA VACCINE  2019  

 

 HgA1C  2020  

 

 CREATININE (SERUM)  2020, 2019,  



     2019, Additional history  



     exists  

 

 PNEUMOCOCCAL 0-64 YEARS COMBINED  Completed  2015  



 SERIES      



documented as of this encounter



Results

Not on filedocumented in this encounter



Insurance







 Payer  Benefit Plan /  Subscriber ID  Effective Dates  Phone  Address  Type



   Group          

 

 [employer] MEDICA[place] OF  xxxxxxxxx  2019-Present  [phone]  [address]  Medicaid



   [place]        [id number] [place] [address]  



           [address]  



documented as of this encounter

## 2019-09-02 NOTE — XMS REPORT
Summary of Care

 Created on:2019



Patient:Joe Archuleta

Sex:Male

:1970

External Reference #:[id number]





Demographics







 Address  [address] [place] [address]

 

 Mobile Phone  [phone]

 

 Home Phone  [phone]

 

 Phone  [phone]

 

 Email Address  [email or web address]

 

 Preferred Language  English

 

 Marital Status  

 

 Muslim Affiliation  Unknown

 

 Race  White

 

 Ethnic Group   or 









Author







 Organization  [facility]

 

 Address  [address] [place] [address]









Support







 Name  Relationship  Address  Phone

 

 Mandie Blair  Unavailable  [address]  [phone]



     [place] [address]  









Care Team Providers







 Name  Role  Phone

 

 Veronica Huff MD  Primary Care Provider  [phone]









Reason for Visit







 Reason  Comments

 

 Notification  







Encounter Details







 Date  Type  Department  Care Team  Description

 

 2019  Telephone  [facility] Family Medicine  Veronica Huff MD



  Notification



     - [address] [address]



  



     [place] [address] [phone] (Fax)  







Allergies

No Known Allergiesdocumented as of this encounter (statuses as of 2019)



Medications







 Medication  Sig  Dispensed  Refills  Start Date  End Date  Status

 

 furosemide 40 mg  TAKE 1 TABLET BY    0  2019    Active



 tablet  MOUTH ONCE DAILY          

 

 spironolactone 100 mg  TAKE 1 TABLET BY    1  2019    Active



 tablet  MOUTH ONCE DAILY          

 

 Insulin Glargine  inject 15 Units  6 mL  2019    Active



 (LANTUS SOLOSTAR  under the skin at          



 U-100 INSULIN) 100  bedtime.          



 unit/mL (3 mL)            



 injectionIndications:            



 Diabetes mellitus            



 type 2 with            



 complications,            



 uncontrolled            

 

 Insulin Needles,  Use as directed  100 Each  2019    Active



 Disposable, (PEN  daily to inject          



 NEEDLE) 32 gauge x  insulin          



 "            



 NdleIndications:            



 Diabetes mellitus            



 type 2 with            



 complications,            



 uncontrolled            

 

 glimepiride 1 mg  Take 1 tablet by  30 tablet  5  2019    Active



 tabletIndications:  mouth daily with          



 Diabetes mellitus  breakfast.          



 type 2 with            



 complications,            



 uncontrolled            

 

 pantoprazole 40 mg EC  Take 1 tablet by    0  2019    Active



 tablet  mouth daily.          

 

 lactulose 10 gram/15  Take 30 mL by mouth  2700 mL  1  2019    Active



 mL  3 (three) times          



 solutionIndications:  daily as needed for          



 Hepatic  Constipation or          



 encephalopathy  Encephalophathy.          

 

 ENEMA BAG  Use as directed  1 Each  5  2019    Active



 MiscIndications:            



 Hepatic            



 encephalopathy,            



 Hyperammonemia            

 

 citalopram 20 mg  Take 1 tablet by  30 tablet  2  2019    Active



 tabletIndications:  mouth daily.          



 Anxiety            



documented as of this encounter (statuses as of 2019)



Active Problems







 Problem  Noted Date

 

 Hyperammonemia  2019

 

 Obesity (BMI 30-39.9)  2019

 

 Hepatic encephalopathy  2015

 

 S/P TIPS (transjugular intrahepatic portosystemic shunt)  2015

 

 Hospice care patient  2015

 

 History of CVA (cerebrovascular accident)  2015

 

 History of MI (myocardial infarction)  2015



documented as of this encounter (statuses as of 2019)



Immunizations







 Name  Administration Dates  Next Due

 

 Influenza Virus Vaccine Quad IM 3+ YRS  2015  

 

 Pneumococcal Polysaccharide, PPSV23 (PNEUMOVAX)  2015  



documented as of this encounter



Social History







 Tobacco Use  Types  Packs/Day  Years Used  Date

 

 Never Smoker        









 Smokeless Tobacco: Never Used      









 Alcohol Use  Drinks/Week  oz/Week  Comments

 

 Not Currently      









 Education  Answer  Date Recorded

 

 What is the highest level of school you have completed or the  9th grade  



 highest degree you have received?    









 Financial Resource Strain  Answer  Date Recorded

 

 How hard is it for you to pay for the very basics like  Somewhat hard  2019



 food, housing, medical care, and heating?    









 Food Insecurity  Answer  Date Recorded

 

 Within the past 12 months, you worried that your food would  Never true  2019



 run out before you got money to buy more.    

 

 Within the past 12 months, the food you bought just didn't  Never true  2019



 last and you didn't have money to get more.    









 Transportation Needs  Answer  Date Recorded

 

 In the past 12 months, has lack of transportation kept you from  No  2019



 medical appointments or from getting medications?    

 

 In the past 12 months, has lack of transportation kept you from  No  2019



 meetings, work, or getting things needed for daily living?    









 Sex Assigned at Birth  Date Recorded

 

 Not on file  









 Job Start Date  Occupation  Industry

 

 Not on file  Not on file  Not on file









 Travel History  Travel Start  Travel End









 No recent travel history available.



documented as of this encounter



Last Filed Vital Signs

Not on filedocumented in this encounter



Plan of Treatment







 Date  Type  Specialty  Care Team  Description

 

 2019  Office Visit  Family Medicine  Veronica Huff MD



  



       [address] [place] [address] [place] [address] [phone] (Fax)  









 Health Maintenance  Due Date  Last Done  Comments

 

 EYE EXAM  1981    

 

 LDL-C  1981    

 

 URINE MICROALBUMIN  1981    

 

 FOOT EXAM  1989    

 

 DTaP,Tdap,and Td Vaccines (1 -  1990    



 Tdap)      

 

 INFLUENZA VACCINE  2019  

 

 HgA1C  2020  

 

 CREATININE (SERUM)  2020, 2019,  



     2019, Additional history  



     exists  

 

 PNEUMOCOCCAL 0-64 YEARS COMBINED  Completed  2015  



 SERIES      



documented as of this encounter



Results

Not on filedocumented in this encounter



Insurance







 Payer  Benefit Plan /  Subscriber ID  Effective Dates  Phone  Address  Type



   Group          

 

 [employer] [place]  xxxxxxxxx  2019-Present  [phone]  [address]  [place]icaid [place]        [id number] [place] [address]  



           [id number]  



documented as of this encounter

## 2019-09-02 NOTE — XMS REPORT
Summary of Care

 Created on:2019



Patient:Joe Archuleta

Sex:Male

:1970

External Reference #:[id number]





Demographics







 Address  [address]



   [place] [address]

 

 Mobile Phone  [phone]

 

 Home Phone  [phone]

 

 Phone  [phone]

 

 Preferred Language  English

 

 Marital Status  

 

 Moravian Affiliation  Unknown

 

 Race  White

 

 Ethnic Group   or 









Author







 Organization  [facility]

 

 Address  [address] [place] [address]









Support







 Name  Relationship  Address  Phone

 

 Mandie Blair  Unavailable  [address]  [phone]



     [place] [address]  

 

 Chris Archuleta  Unavailable  Unavailable  [phone]









Care Team Providers







 Name  Role  Phone

 

 Veronica Huff MD  Primary Care Provider  [phone]









Reason for Referral

 (Routine)





 Status  Reason  Specialty  Diagnoses / Procedures  Referred By  Referred To



         Contact  Contact

 

 New Request      Diagnoses



Hepatic encephalopathy  Movva, Fernandez,  Syst,



       



Procedures



Discharge Follow-up: PCP REFERRING/PCP PROV NOT IN SYST; 1 Week  [facility]



  Referring/Pcp



         [address] [place] [address]



  Prov Not In



         [place]  



         [address]



  



         Phone:  



         [phone]



  



         Fax: [phone]  





Radiology Services (STAT)





 Status  Reason  Specialty  Diagnoses /  Referred By  Referred To



       Procedures  Contact  Contact

 

 New Request    Diagnostic  Diagnoses



Hyperglycemia  Stanley Moreira  



     Radiology  



Procedures



Chest 2 Views  MD Kalpesh



  



         [address] [facility]  



         [id number] [place] [address]



  



         Phone:  



         [phone]



  



         Fax:  



         [phone]  





Radiology Services (STAT)





 Status  Reason  Specialty  Diagnoses /  Referred By  Referred To



       Procedures  Contact  Contact

 

 New Request    Diagnostic  Diagnoses



Hyperglycemia  Stanley Moreira  



     Radiology  



Procedures



Chest 2 Views  MD Kalpesh



  



         [address] [facility]  



         [id number] [place], [place] [id number]



  



         Phone:  



         [phone]



  



         Fax:  



         [phone]  









Reason for Visit







 Reason  Comments

 

 Hyperglycemia  



Auth/Cert





 Status  Reason  Specialty  Diagnoses /  Referred By  Referred To



       Procedures  Contact  Contact

 

     Emergency Medicine  Diagnoses



DIABETIC,HIGH SUGAR    [facility]



           Dept







           [address] [place] [address]



           [place]



           [address]







           Phone:



           [phone]







           Fax: [phone]









Encounter Details







 Date  Type  Department  Care Team  Description

 

 2019 -  Emergency  [facility] Surgery  Stanley Moreira MD



[address] [facility] [id number]



[place] [address]



[phone] (Fax)  Hepatic encephalopathy



 2019    Unit



  



Fernandez Rees [facility]



[address] [place] [address]



[phone] (Fax)  



     [address] [place] [address] 



    



     [place] [address]



    



     [phone]    







Allergies

No Known Allergiesdocumented as of this encounter (statuses as of 2019)



Medications







 Medication  Sig  Dispensed  Refills  Start Date  End Date  Status

 

 lactulose 10 gram/15  Take 30 mL by    0      Active



 mL solution  mouth 3 (three)          



   times daily as          



   needed for          



   Constipation.          

 

 citalopram 20 mg  Take 20 mg by    0      Active



 tablet  mouth daily.          

 

 Insulin Glargine  inject 15 Units    0      Active



 (LANTUS SOLOSTAR U-100  under the skin at          



 INSULIN) 100 unit/mL  bedtime as needed.          



 (3 mL) injection            

 

 Insulin Needles,  Use as directed    0      Active



 Disposable, 32 gauge x            



 5/32" Ndle            

 

 glimepiride 1 mg  Take 1 mg by mouth    0      Active



 tablet  daily with          



   breakfast.          



documented as of this encounter (statuses as of 2019)



Active Problems







 Problem  Noted Date

 

 Hyperglycemia  2019

 

 Alcoholic cirrhosis  2019

 

 Chronic hepatitis C without hepatic coma  2019

 

 Chronic abdominal pain  2019

 

 Anxiety  2019

 

 Diabetes mellitus type 2 with complications, uncontrolled  2019

 

 Hyperammonemia  2019

 

 Obesity (BMI 30-39.9)  2019

 

 Hepatic encephalopathy  2015

 

 S/P TIPS (transjugular intrahepatic portosystemic shunt)  2015

 

 History of CVA (cerebrovascular accident)  2015

 

 History of MI (myocardial infarction)  2015



documented as of this encounter (statuses as of 2019)



Resolved Problems







 Problem  Noted Date  Resolved Date

 

 History of alcoholism  2019

 

 Hospice care patient  2015



documented as of this encounter (statuses as of 2019)



Immunizations







 Name  Administration Dates  Next Due

 

 Influenza Virus Vaccine Quad IM 3+ YRS  2015  

 

 Pneumococcal Polysaccharide, PPSV23 (PNEUMOVAX)  2015  



documented as of this encounter



Social History







 Tobacco Use  Types  Packs/Day  Years Used  Date

 

 Never Smoker        









 Smokeless Tobacco: Never Used      









 Alcohol Use  Drinks/Week  oz/Week  Comments

 

 Not Currently      









 Education  Answer  Date Recorded

 

 What is the highest level of school you have completed or the  9th grade  



 highest degree you have received?    









 Financial Resource Strain  Answer  Date Recorded

 

 How hard is it for you to pay for the very basics like  Somewhat hard  2019



 food, housing, medical care, and heating?    









 Food Insecurity  Answer  Date Recorded

 

 Within the past 12 months, you worried that your food would  Never true  2019



 run out before you got money to buy more.    

 

 Within the past 12 months, the food you bought just didn't  Never true  2019



 last and you didn't have money to get more.    









 Transportation Needs  Answer  Date Recorded

 

 In the past 12 months, has lack of transportation kept you from  No  2019



 medical appointments or from getting medications?    

 

 In the past 12 months, has lack of transportation kept you from  No  2019



 meetings, work, or getting things needed for daily living?    









 Sex Assigned at Birth  Date Recorded

 

 Not on file  









 Job Start Date  Occupation  Industry

 

 Not on file  Not on file  Not on file









 Travel History  Travel Start  Travel End









 No recent travel history available.



documented as of this encounter



Last Filed Vital Signs







 Vital Sign  Reading  Time Taken  Comments

 

 Blood Pressure  106/67  2019 11:27 AM CDT  

 

 Pulse  63  2019 11:27 AM CDT  

 

 Temperature  36.8 C (98.2 F)  2019 11:27 AM CDT  

 

 Respiratory Rate  16  2019 11:27 AM CDT  

 

 Oxygen Saturation  93%  2019 11:27 AM CDT  

 

 Inhaled Oxygen Concentration  -  -  

 

 Weight  104.3 kg (230 lb)  2019  4:07 PM CDT  

 

 Height  185.4 cm (6' 1")  2019  4:07 PM CDT  

 

 Body Mass Index  30.34  2019  4:07 PM CDT  



documented in this encounter



Discharge Summaries

Fernandez Rees MBBS - 2019 10:13 AM CDTFormatting of this note might be 
different from the original.

 Discharge Summary



ADMIT DATE: 2019

DISCHARGE DATE:

19



REASON FOR ADMISSION

Hepatic encephalopathy



FINAL DIAGNOSIS:Hepatic encephalopathy



SIGNIFICANT LAB/X-RAYS:



Lab results:



CBC BMP PT/INR

WBC (10*3/L)

Date Value

2019 2.62 (L)

  NA (mmol/L)

Date Value

2019 142

  No results found for: PT

RBC (10*6/L)

Date Value

2019 4.08 (L)

 K (mmol/L)

Date Value

2019 4.1

 INR (no units)

Date Value

2019 1.5



PLT (10*3/L)

Date Value

2019 80 (L)

 CALCIUM (mg/dL)

Date Value

2019 8.2 (L)



HGB (g/dL)

Date Value

2019 12.5

  CL (mmol/L)

Date Value

2019 112 (H)

  aPTT

HCT (%)

Date Value

2019 36.6 (L)

  BUN (mg/dL)

Date Value

2019 17

  APTT Patient (Seconds)

Date Value

2019 38



   CREATININE (mg/dL)

Date Value

2019 0.43 (L)



   GLUCOSE (mg/dL)

Date Value

2019 240 (H)



 CO2 TOTAL (mmol/L)

Date Value

2019 23









X-ray results: Xr Chest 1 Vw



Result Date: 2019

No acute cardiopulmonary abnormality. Dilated main pulmonary arteries is 
suspected. IAni MD., have reviewed this study and agree with the 
above report.



Chest 2 Views



Result Date: 2019

No acute cardiopulmonary abnormality. IBee MD., have 
reviewed this study and agree with the above report.



Ct Cervical Spine Wo Contrast



Result Date: 2019

No cervical spine traumatic malalignment, acute fracture or subluxation is 
identified. Mild mid cervical spine spondylosis. IPa MD., 
have reviewed this study and agree with the above report.



Ct Head Wo Contrast



Result Date: 2019

No acute intracranial findings. Joey HILL have reviewed this study 
and agree with the above report. Joey HILL MD., have reviewed 
this study and agree with the above report.



Ct Head Wo Contrast



Result Date: 2019

No acute intracranial findings. IPa MD., have reviewed 
this study and agree with the above report.



Physical Exam:

Constitutional: normal body habitus

Eyes: EOMI PERRLA

ENT: OP clear. Nasal septum in midline ears normal

Respiratory: No chest wall tenderness, equal and symmetric expansion b/l CTA b/l

Cardiovascular : Regular rate and rhythm S1S2 normal. No murmur. No cyanosis 
clubbing or edema



HOSPITAL COURSE:

49 year-old admitted with acute hepatic encephalopathy sec to non compliance 
with po lactulose and much improved with lactulose and d/armando home



DISCHARGE CONDITION:

fair



COGNITIVE STATUS:

cognitively intact



DIET:

diabetic



ACTIVITY:

as tolerated



DISCHARGE MEDICATIONS:

Current Discharge Medication List



CONTINUE these medications which have NOT CHANGED

 Details

citalopram (CELEXA) 20 mg Take 20 mg by mouth daily.



glimepiride (AMARYL) 1 mg Take 1 mg by mouth daily with breakfast.



Insulin Glargine (LANTUS SOLOSTAR U-100 INSULIN) 15 Units inject 15 Units under 
the skin at bedtime as needed.



Insulin Needles, Disposable, 32 gauge x 5/32" Ndle Use as directed



lactulose (CEPHULAC) 30 mL Take 30 mL by mouth 3 (three) times daily as needed 
for Constipation.



blood sugar diagnostic (CONTOUR NEXT TEST STRIPS) strip Use as directed, once a 
day to monitor bloodglucose for ICD code E11.9

Qty: 100 Strip, Refills: 0

Start date: 2019







DISCHARGE:

home self care



FOLLOW-UP APPOINTMENT:

Future Appointments

   Provider Department Dept Phone Center

 2019 10:15 AM Veronica Huff MD [facility] Family Medicine [facility] [phone] [facility]





Please call paging services at [phone] to contact STACY Miguel with 
any questions.



Electronically signed by Fernandez Rees MBBS at 2019 10:18 AM 
CDTdocumented in this encounter



Discharge Instructions

AttachmentsThe following attachments cannot be sent through Care 
Everywhere.Ammonia (English)Lactulose oral solution (English)documented in this 
encounter



Progress Notes

Nehal Canales LBSW - 2019 12:03 PM CDTSubjective



Patient ID: Joe Archuleta is a 49 year old male.



Care Management Social Functional Assessment

Patient Name: Joe Archuleta     Age: 49 year old     Sex: male     MRN: [id number]

Previous admit date: N/A



Current diagnosis and co-morbidities:

Hepatic encephalopathy



Readmission Questions:

Was patient discharged from any acute care hospital within the last 30 days: No



Social Functional Assessment:

Primary language spoken/preferred: English

Mental Status: Alert &amp; Oriented to Person,Place &amp; Time

Information given by: Self

Patient's support system: Spouse;Child

Name and number of support system: Mandie Blair, wife [phone] marisela 
Chris Archuleta, dtr [phone]

Primary Care Giver: Self

MPOA: No

Living Arrangement: Home

Address of living arrangement : [address] [place] [address]

Persons living in home: Self;Child;Spouse

Barriers to returning home: None

Baseline functional status- ambulation: Independent

Functional status-baseline personal care: Independent

Baseline functional status- driving: Independent

Baseline functional status- grocery shopping: Independent

Functional status-baseline housekeeping: Independent

Functional status-baseline meal prep: Independent

Current functional status same as prior: Yes

Do you have a PCP?: Yes

Name of PCP: Dr. Navarrete

Home Health Care Agency: No

Provider Services: No

DME Company: No

Equipment: None

Hemodialysis: No

Community resources utilized: None

Funding Resources: Medicaid

Prescription coverage plan: Medicaid-3 slots

Pharmacy where meds are filled: Other

Other pharmacy: [facility]

Anticipated services prior to disharge: Continue Medical Eval

Expected mode of discharge transportation: Same as support system

Additional Recommendations for DC: Medical clearance

Additional info required for discharge planning: Pending medical evaluation

Recommended discharge plan: Home



SFA Complete:

Social Functional Assessment complete: Yes



Alcohol Use Screening (AUDIT-C)

How often do you have a drink containing alcohol?: Never

SCORE: 0

Did patient elect to have resources provided: No



Role of Care Management explained.



Review of Systems



Objective



Physical Exam

Assessment/Plan

Home



HEATHER Baxter

 - Care Management

[facility]

[phone]

[email or web address]



Electronically signed by Nehal Canales LBSW at 2019 12:03 PM 
CDTdocumented in this encounter



Plan of Treatment







 Date  Type  Specialty  Care Team  Description

 

 2019  Office Visit  Family Medicine  Veronica Huff MD



  



       [address] [place] [address] [place] [id number] [phone] (Fax)  









 Health Maintenance  Due Date  Last Done  Comments

 

 EYE EXAM  1981    

 

 LDL-C  1981    

 

 URINE MICROALBUMIN  1981    

 

 FOOT EXAM  1989    

 

 DTaP,Tdap,and Td Vaccines (1 -  1990    



 Tdap)      

 

 INFLUENZA VACCINE  2019  

 

 HgA1C  2020, 2019  

 

 CREATININE (SERUM)  2020, 2019,  



     2019, Additional history  



     exists  

 

 PNEUMOCOCCAL 0-64 YEARS COMBINED  Completed  2015  



 SERIES      



documented as of this encounter



Procedures







 Procedure Name  Priority  Date/Time  Associated Diagnosis  Comments

 

 POCT GLUCOSE  Routine  2019 11:26    Results for this



 (AUTOMATED)    AM CDT    procedure are in



         the results



         section.

 

 POCT GLUCOSE  Routine  2019  7:35    Results for this



 (AUTOMATED)    AM CDT    procedure are in



         the results



         section.

 

 AMMONIA, PLASMA  Routine  2019  5:11    Results for this



     AM CDT    procedure are in



         the results



         section.

 

 POCT GLUCOSE  Routine  2019  9:45    Results for this



 (AUTOMATED)    PM CDT    procedure are in



         the results



         section.

 

 XR CHEST 2 VW  STAT  2019  4:40  Hyperglycemia  Results for this



     PM CDT    procedure are in



         the results



         section.

 

 ACTIVATED PARTIAL  STAT  2019  4:35  Hyperglycemia  Results for this



 THRMPLAS JOESPH    PM CDT    procedure are in



         the results



         section.

 

 PROTHROMBIN TIME /  STAT  2019  4:35  Hyperglycemia  Results for this



 INR    PM CDT    procedure are in



         the results



         section.

 

 TROPONIN I  STAT  2019  4:35  Hyperglycemia  Results for this



     PM CDT    procedure are in



         the results



         section.

 

 AMMONIA, PLASMA  STAT  2019  4:34  Hyperglycemia  Results for this



     PM CDT    procedure are in



         the results



         section.

 

 EKG-12 LEAD  STAT  2019  4:29    



     PM CDT    

 

 CBC WITH DIFFERENTIAL  STAT  2019  4:25  Hyperglycemia  Results for this



     PM CDT    procedure are in



         the results



         section.

 

 CBC WITH DIFF  Routine  2019  4:25  Hyperglycemia  Results for this



     PM CDT    procedure are in



         the results



         section.

 

 BASIC METABOLIC PANEL  STAT  2019  4:25  Hyperglycemia  Results for this



 (NA, K, CL, CO2,    PM CDT    procedure are in



 GLUCOSE, BUN,        the results



 CREATININE, CA)        section.

 

 HEPATIC FUNCTION  STAT  2019  4:25  Hyperglycemia  Results for this



 PANEL (91345)    PM CDT    procedure are in



 (ALB,T.PRO,BILI        the results



 T,BU/BC,ALT,AST,ALK        section.



 PHOS)        

 

 POCT GLUCOSE  Routine  2019  4:04    Results for this



 (AUTOMATED)    PM CDT    procedure are in



         the results



         section.



documented in this encounter



Results

POCT GLUCOSE (AUTOMATED) (2019 11:26 AM CDT)





 Component  Value  Ref Range  Performed At  Pathologist Signature

 

 POCT GLU  203 (H)  70 - 110 mg/dL  [facility]  



       LABORATORY  









 Specimen

 

 Blood









 Performing Organization  Address  City/[facility]  Phone Number

 

 [facility]  CLIA: [id number], [address] [place] [address]  



 LABORATORY  [address]    



POCT GLUCOSE (AUTOMATED) (2019  7:35 AM CDT)





 Component  Value  Ref Range  Performed At  Pathologist Signature

 

 POCT GLU  177 (H)  70 - 110 mg/dL  [facility]  



       LABORATORY  









 Specimen

 

 Blood









 Performing Organization  Address  [facility]  Phone Number

 

 [facility]  CLIA: [id number], [address] [place] [address]  



 LABORATORY  [address]    



AMMONIA, PLASMA (2019  5:11 AM CDT)





 Component  Value  Ref Range  Performed At  Pathologist Signature

 

 AMMONIA  56 (H)  9 - 33 umol/L  [facility]  



       LABORATORY  









 Specimen

 

 Blood - ARM, LEFT









 Performing Organization  Address  City/[facility]  Phone Number

 

 [facility]  CLIA: [id number], [address] [place] [address]  



 LABORATORY  [address]    



POCT GLUCOSE (AUTOMATED) (2019  9:45 PM CDT)





 Component  Value  Ref Range  Performed At  Pathologist Signature

 

 POCT GLU  229 (H)  70 - 110 mg/dL  [facility]  



       LABORATORY  









 Specimen

 

 Blood









 Performing Organization  Address  City/[facility]  Phone Number

 

 [facility]  CLIA: [id number], [address] [place] [address]  



 LABORATORY  [address]    



Chest 2 Views (2019  4:40 PM CDT)





 Specimen

 

 









 Impressions  Performed At

 

  



  PACS/VR/DOSE



 No acute cardiopulmonary abnormality.



  



  



  



 IBeeschner, MD., have reviewed this study and agree with  



 the



  



 above report.  









 Narrative  Performed At

 

 * * * * * * * * ORIGINAL REPORT * * * * * * * *



  PACS/VR/DOSE



 XR CHEST 2 VW



  



  



  



 HISTORY: chest pain 



  



  



  



 COMPARISON: 2019



  



  



  



 FINDINGS:



  



  



  



 The lungs are clear. No focal consolidation, pneumothorax, or pleural



  



 effusion is seen. Dilated central pulmonary arteries are again noted.



  



  



  



 The cardiomediastinal silhouette is normal. A TIPS shunt is unchanged in



  



 position. Embolization coils overlie the epigastric region.



  



  



  



 No acute osseous abnormality. Remote right-sided rib fractures are again



  



 seen.



  



   









 Procedure Note

 

 [facility], Radiant Results Inft User - 2019  6:03 PM CDT



* * * * * * * * ORIGINAL REPORT * * * * * * * *



 XR CHEST 2 VW



 



 HISTORY: chest pain



 



 COMPARISON: 2019



 



 FINDINGS:



 



 The lungs are clear. No focal consolidation, pneumothorax, or pleural



 effusion is seen. Dilated central pulmonary arteries are again noted.



 



 The cardiomediastinal silhouette is normal. A TIPS shunt is unchanged in



 position. Embolization coils overlie the epigastric region.



 



 No acute osseous abnormality. Remote right-sided rib fractures are again



 seen.



 



 IMPRESSION



 



 No acute cardiopulmonary abnormality.



 



 IBee MD., have reviewed this study and agree with the



 above report.









 Performing Organization  Address  City/[facility]/Zipcode  Phone Number

 

 PACS/VR/DOSE      



Prothrombin Time (PT) / INR (2019  4:35 PM CDT)





 Component  Value  Ref Range  Performed At  Pathologist



         Signature

 

 PROTIME PATIENT  16.9 (H)  12.0 - 14.7  [facility]  



       LABORATORY  

 

 INR  1.5Comment: Normal    [facility]  



   INR <1.1; Warfarin    [facility]  



   Therapeutic range    LABORATORY  



   2.0 to 3.0 or 2.5      



   to 3.5, depending      



   upon the      



   indications.      









 Specimen

 

 Blood - VENOUS









 Performing Organization  Address  City/[facility]/Zipcode  Phone Number

 

 [facility]  CLIA: [id number], [address]  [place] [address]  



 LABORATORY  [address]    



aPTT (2019  4:35 PM CDT)





 Component  Value  Ref Range  Performed At  Pathologist Signature

 

 APTT Patient  38  23 - 38 Seconds  [facility]  



       LABORATORY  









 Specimen

 

 Blood - VENOUS









 Narrative  Performed At

 

 The [facility] patient population mean normal value  [facility] 
LABORATORY



 for aPTT is 30 seconds.  









 Performing Organization  Address  City/[facility]  Phone Number

 

 [facility]  CLIA: [id number], [address]  [place] [address]  



 LABORATORY  [address]    



Troponin I (2019  4:35 PM CDT)





 Component  Value  Ref Range  Performed At  Pathologist Signature

 

 TROPONIN I  <0.012  <=0.034 ng/mL  [facility]  



       LABORATORY  









 Specimen

 

 Blood - VENOUS









 Narrative  Performed At

 

 Equal or Less than 0.034 ng/ml---Normal



  [facility] LABORATORY



 Note: Cardiac troponin begins to rise 3-4 hours  



 after the onset of ischemia. Repeat in 4-6 hours  



 if the sample was drawn within 3-4 hours of the  



 onset of the symptom and found normal. 



  



  



  



 Between 0.035 and 0.120 ng/mL--- Borderline.  



 Questionable myocardial injury or  



 necrosis



  



 Note: Serial measurement may be necessary to  



 confirm or exclude the diagnosis of myocardial  



 injury or necrosis; Clinical correlation  



 (symptoms, EKGs, imaging studies, and others)  



 required; Repeat in 4-6 hours if clinically  



 indicated.



  



  



  



 Equal or Higher than 0.121 ng/mL---Abnormal.  



 Myocardial Injury or Necrosis  



 Likely 



  



 Biotin has been reported to cause a negative  



 bias, interpret results relative to patient's  



 use of  



 biotin.  



  



  



   



   



   









 Performing Organization  Address  City[facility]  Phone Number

 

 [facility]  CLIA: [id number], [address] [place], [place] [address]  



 LABORATORY  [address]    



AMMONIA, PLASMA (2019  4:34 PM CDT)





 Component  Value  Ref Range  Performed At  Pathologist Signature

 

 AMMONIA  104 (H)  9 - 33 umol/L  [facility]  



       LABORATORY  









 Specimen

 

 Blood - VENOUS









 Performing Organization  Address  City/[facility]  Phone Number

 

 [facility]  CLIA: [id number], [address] [place] [address]  



 LABORATORY  [address]    



CBC WITH DIFFERENTIAL (2019  4:25 PM CDT)





 Component  Value  Ref Range  Performed At  Pathologist



         Signature

 

 WBC  2.62 (L)  4.20 - 10.70  [facility]  



     10*3/L  [facility]  



       LABORATORY  

 

 RBC  4.08 (L)  4.26 - 5.52  [facility]  



     10*6/L  [facility]  



       LABORATORY  

 

 HGB  12.5  12.2 - 16.4  [facility]  



     g/dL  HOSPITAL  



       LABORATORY  

 

 HCT  36.6 (L)  38.4 - 49.3 %  [facility]  



       LABORATORY  

 

 MCV  89.7  81.7 - 95.6  [facility]  



       LABORATORY  

 

 MCH  30.6  26.1 - 32.7  [facility]  



     pg  [facility]  



       LABORATORY  

 

 MCHC  34.2  31.2 - 35.0  [facility]  



     g/dL  [facility]  



       LABORATORY  

 

 RDW-SD  45.5  38.5 - 51.6  [facility]  



       LABORATORY  

 

 RDW-CV  14.0  12.1 - 15.4 %  [facility]  



       LABORATORY  

 

 PLT  80 (L)  150 - 328  [facility]  



     10*3/L  [facility]  



       LABORATORY  

 

 MPV  11.7  9.8 - 13.0 fL  [facility]  



       LABORATORY  

 

 IPF %  2.7Comment: Platelet  1.2 - 10.7 %  [facility]  



   count measured by    HOSPITAL  



   fluorescence method.    LABORATORY  

 

 NRBC/100 WBC  0.0  0.0 - 10.0  [facility]  



     /100 WBCs  [facility]  



       LABORATORY  

 

 NRBC x10^3  <0.01  10*3/L  [facility]  



       LABORATORY  

 

 GRAN MAT (NEUT) %  50.4  %  [facility]  



       LABORATORY  

 

 IMM GRAN %  0.40  %  [facility]  



       LABORATORY  

 

 LYMPH %  30.9  %  [facility]  



       LABORATORY  

 

 MONO %  13.4  %  [facility]  



       LABORATORY  

 

 EOS %  3.4  %  [facility]  



       LABORATORY  

 

 BASO %  1.5  %  [facility]  



       LABORATORY  

 

 GRAN MAT  1.32 (L)  1.99 - 6.95  [facility]  



 x10^3(ANC)    10*3/uL  [facility]  



       LABORATORY  

 

 IMM GRAN x10^3  <0.03  0.00 - 0.06  [facility]  



     10*3/uL  [facility]  



       LABORATORY  

 

 LYMPH x10^3  0.81 (L)  1.09 - 3.23  [facility]  



     10*3/uL  [facility]  



       LABORATORY  

 

 MONO x10^3  0.35 (L)  0.36 - 1.02  [facility]  



     10*3/uL  [facility]  



       LABORATORY  

 

 EOS x10^3  0.09  0.06 - 0.53  [facility]  



     10*3/uL  [facility]  



       LABORATORY  

 

 BASO x10^3  0.04  0.01 - 0.09  [facility]  



     10*3/uL  [facility]  



       LABORATORY  









 Specimen

 

 Blood - VENOUS









 Performing Organization  Address  City/State/Zipcode  Phone Number

 

 [facility]  CLIA: [id number], [address]  [place] [address]  



 LABORATORY  [address]    



Hepatic Function Panel (ALB, T.PRO, BILI T, BU/BC, ALT, AST, ALK PHOS) (2019  4:25 PM CDT)





 Component  Value  Ref Range  Performed At  Pathologist Signature

 

 TOTAL BILI  1.7 (H)  0.1 - 1.1 mg/dL  [facility]  



       LABORATORY  

 

 BILI UNCON  1.6 (H)  0.1 - 1.1 mg/dL  [facility]  



       LABORATORY  

 

 BILI CONJ  0.0  0.0 - 0.3 mg/dL  [facility]  



       LABORATORY  

 

 T PROTEIN  6.3  6.3 - 8.2 g/dL  [facility]  



       LABORATORY  

 

 ALBUMIN  3.0 (L)  3.5 - 5.0 g/dL  [facility]  



       LABORATORY  

 

 ALK PHOS  105  34 - 122 U/L  [facility]  



       LABORATORY  

 

 ALT(SGPT)  52 (H)  9 - 51 U/L  [facility]  



       LABORATORY  

 

 AST(SGOT)  63 (H)  13 - 40 U/L  [facility]  



       LABORATORY  









 Specimen

 

 Blood - VENOUS









 Performing Organization  Address  City/State/Zipcode  Phone Number

 

 [facility]  CLIA: [id number], [address]  [place] [address]  



 LABORATORY  [facility]    



Basic Metabolic Panel (NA, K, CL, CO2, GLUCOSE, BUN, CREATININE, CA) (2019  4:25 PM CDT)





 Component  Value  Ref Range  Performed At  Pathologist



         [facility]

 

 NA  142  135 - 145  [facility]  



     mmol/L  [facility] LABORATORY  

 

 K  4.1  3.5 - 5.0  [facility]  



     mmol/L  [facility] LABORATORY  

 

 CL  112 (H)  98 - 108 mmol/L  [facility] LABORATORY  

 

 CO2 TOTAL  23  23 - 31 mmol/L  [facility] LABORATORY  

 

 AGAP  7  2 - 16  [facility] LABORATORY  

 

 BUN  17  7 - 23 mg/dL  [facility] LABORATORY  

 

 GLUCOSE  240 (H)  70 - 110 mg/dL  [facility] LABORATORY  

 

 CREATININE  0.43 (L)  0.60 - 1.25  [facility]  



     mg/dL  [facility] LABORATORY  

 

 CALCIUM  8.2 (L)  8.6 - 10.6  [facility]  



     mg/dL  [facility] LABORATORY  

 

 eGFR Calculation  211.2  mL/min/1.73m2  [facility]  



 ([facility] LABORATORY  



 [employer])        

 

 eGFR Calculation  256.0  mL/min/1.73m2  [facility] LABORATORY  









 Specimen

 

 Blood - VENOUS









 Narrative  Performed At

 

 [employer] of Glomerular Filtration Rate (GFR)  [facility] 
LABORATORY



 and Staging of Kidney Disease*



  



 +-----------------------+---------------------+-  



 ------------------------+



  



 | GFR (mL/min/1.73 m2)| With Kidney  



 Damage|Without Kidney Damage



  



 +-----------------------+---------------------+-  



 ------------------------+



  



 |>90|  



 Stage one|  



 Normal



  



 +-----------------------+---------------------+-  



 ------------------------+



  



 |60-89|S  



 tage two| Decreased  



 GFR 



  



 +-----------------------+---------------------+-  



 ------------------------+



  



 |30-59|S  



 tage three| Stage  



 three 



  



 +-----------------------+---------------------+-  



 ------------------------+



  



 |15-29|S  



 tage [age] | Stage  



 four



  



 +-----------------------+---------------------+-  



 ------------------------+



  



 |<15 (or dialysis)|Stage  



 five | Stage  



 five



  



 +-----------------------+---------------------+-  



 ------------------------+



  



 *Each stage assumes the associated GFR level has  



 been in effect for at least three  



 months.Stages 1 to 5, with or without kidney  



 disease, indicate chronic kidney disease.



  



 Notes: Determination of stages one and two (with  



 eGFR >59mL/min/1.73 m2) requires estimation of  



 kidney damage for at least three months as  



 defined by structural or functional  



 abnormalities of the kidney, manifested by  



 either:



  



 Pathological abnormalities or Markers of kidney  



 damage (including abnormalities in the  



 composition of the blood or urine or  



 abnormalities in imaging tests).  









 Performing Organization  Address  City/[facility]/Zipcode  Phone Number

 

 [facility]  CLIA: [id number], [address]  [place] [address]  



 LABORATORY  [address]    



POCT GLUCOSE (AUTOMATED) (2019  4:04 PM CDT)





 Component  Value  Ref Range  Performed At  [facility] Signature

 

 POCT GLU  358 (H)  70 - 110 mg/dL  [facility]  



       LABORATORY  









 Specimen

 

 Blood









 Performing Organization  Address  City[facility]  Phone Number

 

 [facility]  CLIA: [id number], [address]  [place] [address]  



 LABORATORY  [address]    



documented in this encounter



Visit Diagnoses







 Diagnosis

 

 Hepatic encephalopathy - Primary

 

 Hyperglycemia







 Other abnormal glucose

 

 Alcoholic cirrhosis of liver without ascites







 Alcoholic cirrhosis of liver



documented in this encounter



Administered Medications







 Medication Order  MAR Action  Action Date  Dose  Rate  Site

 

 lactulose (CEPHULAC) solution 30  Given  2019  8:39 AM CDT  30 mL    



 mL



          



 30 mL, Oral, TID, First dose on          



 Sun 8/11/19 at 1830, Until          



 Discontinued, Routine          









 Given  2019  9:39 PM CDT  30 mL    









   









 Sliding Scale Insulin - Aspart  Given  2019 12:09 PM CDT  2 Units    
Abdomen-SC



 (NOVOLOG) + Fsbg Testing



          



 Subcutaneous, AC, First dose on 19 at 0730, Until          



 Discontinued, Routine          









   









 









 Medication Order  MAR Action  Action Date  Dose  Rate  Site

 

 lactulose (CEPHULAC) solution 30  Given  2019  5:50 PM CDT  30 mL    



 mL



          



 30 mL, Oral, ONCE, 1 dose, Sun          



 19 at 1845, ASAP          









   



documented in this encounter



Insurance







 Payer  Benefit Plan /  Subscriber ID  Effective Dates  Phone  Address  Type



   Group          

 

 [employer]  xxxxxxxxx  2019-Present  [phone]  [address]  [place]        [id number] [place] [address]  



           [id number]  









 Guarantor Name  Account Type  Relation to  Date of Birth  Phone  Billing



     Patient      Address

 

 Joe Archuleta  Personal/Family  Self  1970  [phone]  [address]







         (Home)  [place]



           [address]



documented as of this encounter

## 2019-09-02 NOTE — XMS REPORT
Summary of Care

 Created on:2019



Patient:Joe Archuleta

Sex:Male

:1970

External Reference #:[id number]





Demographics







 Address  [address]



   [place] [address]

 

 Mobile Phone  [phone]

 

 Home Phone  [phone]

 

 Phone  [phone]

 

 Preferred Language  English

 

 Marital Status  

 

 Pentecostal Affiliation  Unknown

 

 Race  White

 

 Ethnic Group   or 









Author







 Organization  [facility]

 

 Address  [address] [place] [address]









Support







 Name  Relationship  Address  Phone

 

 Mandie Blair  Unavailable  [address]  [phone]



     [place] [address]  









Care Team Providers







 Name  Role  Phone

 

 Veronica Huff MD  Primary Care Provider  [phone]









Reason for Referral

 (ASAP)





 Status  Reason  Specialty  Diagnoses /  Referred By  Referred To



       Procedures  Contact  Contact

 

 Authorized  Location  Gastroenterology  Diagnoses



Hepatic encephalopathy



Chronic hepatitis C without hepatic coma



Chronic abdominal pain  Amee Huff



   Preference    



Procedures



CONSULT/REFERRAL GASTROENTEROLOGY  yoan Funez, Abimael HAIR MD MD







         [facility] [address]  [address]  [address]







         [address]



  [place],



         Phone:  [place]



         [phone]







         Fax:  Phone:



         [phone]







           Fax:



           [phone]









Reason for Visit







 Reason  Comments

 

 Follow-up  F-Up from ER







Encounter Details







 Date  Type  Department  Care Team  Description

 

 2019  Office Visit  [facility]  Veronica Huff  Diabetes 
mellitus type 2 with complications, uncontrolled (Primary Dx);



     Medicine - Shereen MCKEON MD



  Hepatic encephalopathy;



     [facility] [address]



  [address] [place] [address] 



  Chronic hepatitis C without hepatic coma;



     [place], [place]  Chronic abdominal pain;



     [id number]



  Anxiety;



     [phone]



  Hyperammonemia;



       [phone] (Fax)  History of alcoholism







Allergies

No Known Allergiesdocumented as of this encounter (statuses as of 2019)



Medications







 Medication  Sig  Dispensed  Refills  Start Date  End Date  Status

 

 furosemide 40 mg  TAKE 1 TABLET BY    0  2019    Active



 tablet  MOUTH ONCE DAILY          

 

 spironolactone 100  TAKE 1 TABLET BY    1  2019    Active



 mg tablet  MOUTH ONCE DAILY          

 

 Insulin Glargine  inject 15 Units  6 mL  2019    Active



 (LANTUS SOLOSTAR  under the skin at          



 U-100 INSULIN) 100  bedtime.          



 unit/mL (3 mL)            



 injectionIndication            



 s: Diabetes            



 mellitus type 2            



 with complications,            



 uncontrolled            

 

 Insulin Needles,  Use as directed  100 Each  2019    Active



 Disposable, (PEN  daily to inject          



 NEEDLE) 32 gauge x  insulin          



 "            



 NdleIndications:            



 Diabetes mellitus            



 type 2 with            



 complications,            



 uncontrolled            

 

 glimepiride 1 mg  Take 1 tablet by  30 tablet  5  2019    Active



 tabletIndications:  mouth daily with          



 Diabetes mellitus  breakfast.          



 type 2 with            



 complications,            



 uncontrolled            

 

 pantoprazole 40 mg  Take 1 tablet by    0  2019    Active



 EC tablet  mouth daily.          

 

 lactulose 10  Take 30 mL by  2700 mL  1  2019    Active



 gram/15 mL  mouth 3 (three)          



 solutionIndications  times daily as          



 : Hepatic  needed for          



 encephalopathy  Constipation or          



   Encephalophathy.          

 

 ENEMA BAG  Use as directed  1 Each  2019    Active



 MiscIndications:            



 Hepatic            



 encephalopathy,            



 Hyperammonemia            

 

 citalopram 20 mg  Take 1 tablet by  30 tablet  2  2019    Active



 tabletIndications:  mouth daily.          



 Anxiety            

 

 metFORMIN 500 mg  TAKE 1 TABLET BY    0  2019  Discontinued



 tablet  MOUTH ONCE DAILY        19  

 

 busPIRone 10 mg  Take 10 mg by    0  2019  Discontinued



 tablet  mouth 2 (two)        19  



   times daily.          

 

 LACTULOSE ORAL  Take  by mouth.    0  2016  Discontinued



           19  



documented as of this encounter (statuses as of 2019)



Active Problems







 Problem  Noted Date

 

 Alcoholic cirrhosis  2019

 

 Chronic hepatitis C without hepatic coma  2019

 

 Chronic abdominal pain  2019

 

 Anxiety  2019

 

 Diabetes mellitus type 2 with complications, uncontrolled  2019

 

 Hyperammonemia  2019

 

 Obesity (BMI 30-39.9)  2019

 

 Hepatic encephalopathy  2015

 

 S/P TIPS (transjugular intrahepatic portosystemic shunt)  2015

 

 History of CVA (cerebrovascular accident)  2015

 

 History of MI (myocardial infarction)  2015



documented as of this encounter (statuses as of 2019)



Resolved Problems







 Problem  Noted Date  Resolved Date

 

 History of alcoholism  2019

 

 Hospice care patient  2015



documented as of this encounter (statuses as of 2019)



Immunizations







 Name  Administration Dates  Next Due

 

 Influenza Virus Vaccine Quad IM 3+ YRS  2015  

 

 Pneumococcal Polysaccharide, PPSV23 (PNEUMOVAX)  2015  



documented as of this encounter



Social History







 Tobacco Use  Types  Packs/Day  Years Used  Date

 

 Never Smoker        









 Smokeless Tobacco: Never Used      









 Alcohol Use  Drinks/Week  oz/Week  Comments

 

 Not Currently      









 Education  Answer  Date Recorded

 

 What is the highest level of school you have completed or the  9th grade  



 highest degree you have received?    









 Financial Resource Strain  Answer  Date Recorded

 

 How hard is it for you to pay for the very basics like  Somewhat hard  2019



 food, housing, medical care, and heating?    









 Food Insecurity  Answer  Date Recorded

 

 Within the past 12 months, you worried that your food would  Never true  2019



 run out before you got money to buy more.    

 

 Within the past 12 months, the food you bought just didn't  Never true  2019



 last and you didn't have money to get more.    









 Transportation Needs  Answer  Date Recorded

 

 In the past 12 months, has lack of transportation kept you from  No  2019



 medical appointments or from getting medications?    

 

 In the past 12 months, has lack of transportation kept you from  No  2019



 meetings, work, or getting things needed for daily living?    









 Sex Assigned at Birth  Date Recorded

 

 Not on file  









 Job Start Date  Occupation  Industry

 

 Not on file  Not on file  Not on file









 Travel History  Travel Start  Travel End









 No recent travel history available.



documented as of this encounter



Last Filed Vital Signs







 Vital Sign  Reading  Time Taken  Comments

 

 Blood Pressure  137/80  2019 11:22 AM CDT  

 

 Pulse  70  2019 11:22 AM CDT  

 

 Temperature  37.1 C (98.7 F)  2019 11:22 AM CDT  

 

 Respiratory Rate  -  -  

 

 Oxygen Saturation  -  -  

 

 Inhaled Oxygen Concentration  -  -  

 

 Weight  98.4 kg (217 lb)  2019 11:22 AM CDT  

 

 Height  185.4 cm (6' 1")  2019 11:22 AM CDT  

 

 Body Mass Index  28.63  2019 11:22 AM CDT  



documented in this encounter



Patient Instructions

Patient InstructionsVeronica Huff MD - 2019 11:30 AM CDT

Ammonia

Does this test have other names?

Blood ammonia test, NH3

What is this test?

This test checks the level of ammonia in your blood. The test helps find out 
why you may have changes in consciousness and also helps diagnose a liver 
disease called hepatic encephalopathy. This disease affects how your brain works
, because of excess toxins, or poisons, in your body.

Your liver may not work properly if you have high levels of ammonia in your 
blood. Ammonia is a chemical made by bacteria in your intestines and your body'
s cells while you process protein. Your body treats ammonia as a waste product. 
It turns it into an amino acid called glutamine and a chemical compound called 
urea. Your bloodstream moves the urea to your kidneys, where it is eliminated 
in your urine.

But ammonia will build up in your body if you can't get rid of urea. This can 
sometimes happen if you have kidney or liver failure. It can also happen if you 
have a urea cycle disorder, a genetic disorder that means your body is missing 
one of the enzymes that remove ammonia from the blood. The ammonia blood test 
is the gold standard for diagnosing urea cycle disorders.

Too much ammonia in your body can cause psychological problems like confusion, 
tiredness, and possibly coma or death.

A child's reaction to too much ammonia can include seizures, breathing 
difficulties, and potentiallydeath.

Why do I need this test?

You might have this test if you have abnormal neurological changes or you enter 
a coma unexpectedly.You might also have this test if your healthcare provider 
suspects that you have a urea cycle disorder or Reye's syndrome, a potentially 
fatal disease that affects the brain and liver. Children may have this test if 
they frequently vomit or are very tired within a week after a virus-related 
illness.

What other tests might I have along with this test?

If your healthcare provider suspects that you have a urea cycle disorder, he or 
she may order other tests that look at ammonia levels in your body.

What do my test results mean?

Many things may affect your lab test results. These include the method each lab 
uses to do the test.Even if your test results are different from the normal 
value, you may not have a problem. To learn what the results mean for you, talk 
with your healthcare provider.

Test results are given in micrograms per deciliter (mcg/dL).

Normal ranges are:

 Age 0 to 10 days (enzymatic): 170 to 341 mcg/dL

 Infants and toddlers, from 10 days to 2 years old (enzymatic): 68 to 136 mcg/
dL

 Children, older than 2 years (enzymatic): 19 to 60 mcg/dL

 Adults: 10 to 80 mcg/dL

If your test results are higher than normal, it can mean that you have:

 Liver disease

 Reye's syndrome

People who have a portacaval shunt in their liver to treat high blood pressure 
may also have higher levels of ammonia.

Levels that are lower than normal can mean that your kidneys aren't removing 
waste as they should.

How is this test done?

The test requires a blood sample, which is drawn through a needle from a vein 
in your arm.

Does this test pose any risks?

Taking a blood sample with a needle carries risks that include bleeding, 
infection, bruising, or feeling dizzy. When the needle pricks your arm, you may 
feel a slight stinging sensation or pain. Afterward, the site may be slightly 
sore.

What might affect my test results?

Medications such as polymyxin B, diuretics, valproic acid and methicillin can 
cause higher-than-normal results. Other medications, including tetracycline, 
lactulose, monoamine oxidase inhibitors, or neomycin, can cause results that 
are lower than normal.

How do I get ready for this test?

You should avoid exercising or smoking cigarettes before this test, but no 
other preparation is needed. Be sure your healthcare provider knows about all 
medicines, herbs, vitamins, and supplements you are taking. This includes 
medicines that don't need a prescription and any illicit drugs you may use.

 9928-2643 [employer]. [address] [place] [address]. All rights reserved. This information is not intended as a substitute 
for professional medical care. Always follow your healthcare professional's 
instructions.



Electronically signed by Veronica Huff MD at 2019 12:11 PM CDT

documented in this encounter



Progress Notes

Veronica Huff MD - 2019 11:30 AM CDTFormatting of this note might 
be different from the original.

Cc:

Chief Complaint

Patient presents with

 Follow-up

  F-Up from ER-liver disease



HPI

Joe Archuleta is a 49 year old male with a h/o alcoholic cirrhosis, hepatic 
encephalopathy, chronicHep C, s/p TIPS, and recently dx'ed DM2 who presents as 
a new patient to establish care and for ER follow-up.  He is accompanied by his 
wife.  The patient presented to [facility] ER this morning with complaint of 
vomiting and confusion.  The patient underwent the following evaluations:  
Labs.  The patient was diagnosed with hepatic encephalopathy and uncontrolled 
DM2 with hyperglycemia.  Treatments in the ER included:  Lactulose, insulin, 
and IV fluids.  At discharge the patient was instructed to takethe following 
medications:  No new medications were prescribed, he was told to continue his 
chronic meds.  The patient was told to follow-up with us this morning for his 
already scheduled new patient appt.  Since the ER visit the patient has 
experienced the following:  He is feeling better overall than he was prior to 
his ER visit but he reports a continuation of a myriad of symptoms including 
anxiety and restlessness, headaches, hyperglycemia, and chronic abdominal pain.
  His wife reports he has problems with having regular bowel movements even 
when he takes his lactulose regularly.  She says she usually have to give him 
lactulose enemas to get him cleared-out and requests an enema bag so she zeeshan 
this PRN.  She says his chronic abdominal pain is due to "a swollen pancreas" 
and he used to be on methadone for pain management [place] 
[place].  He says the buspirone prescribed for his anxiety "does nothing" and he 
did much better on Xanax in the past.  He was just diagnosed with diabetes 
about 3 weeks ago and he was started on metformin.  They report his glucoses 
have remained in the 300s-400s despite him taking the metformin as prescribed.  
He admits he doesn't follow a diabeticdiet and he has polyphagia.  He says he 
has avoided drugs since his last ER a couple of weeks ago and he has been 
abstinent from alcohol x about 6 months.  He says he recently moved to the area 
to "start over" after a prison stint.  He says he wants to "clean up" and get 
his chronic Hep C treated and hopefully qualify for a liver transplant.  He has 
not established with a GI/Hepatology specialist yetand requests a referral.  He 
says at this time he is unable to see a specialist out of town due to lack of 
transportation.



Allergies

Joe has No Known Allergies.



Medications

Outpatient Medications Prior to Visit

Medication Sig Dispense Refill

 furosemide 40 mg tablet TAKE 1 TABLET BY MOUTH ONCE DAILY  0

 spironolactone 100 mg tablet TAKE 1 TABLET BY MOUTH ONCE DAILY  1

 pantoprazole 40 mg EC tablet Take 1 tablet by mouth daily.  0

 busPIRone 10 mg tablet Take 10 mg by mouth 2 (two) times daily.  0

 LACTULOSE ORAL Take  by mouth.

 metFORMIN 500 mg tablet TAKE 1 TABLET BY MOUTH ONCE DAILY  0

 lactulose 300 mL (Added to water or saline 700 mL) enema 1 Enema



No facility-administered medications prior to visit.



Histories

Past Medical History:

Diagnosis Date

 Anxiety

 Cirrhosis

 Depression

 Diabetes mellitus

 Esophageal varices

 Hepatitis

 Hepatitis C

 History of urinary tract infection

 Hypertension

 Memory loss

 Myocardial infarction 

 Small bowel obstruction

 Stroke 

 left-side weakness; improved after 1 year



Past Surgical History:

Procedure Laterality Date

 EXPLORATORY LAPAROTOMY

 SBO; lysis of adhesions; no resection

 LAPAROSCOPIC CHOLECYSTECTOMY

 OPEN APPENDECTOMY

 TRANSJUGULAR INTRAHEPATIC POROSYSTEMIC SHUNT



Social History



Socioeconomic History

 Marital status: 

  Spouse name: Miguel Archuleta

 Number of children: Not on file

 Years of education: Not on file

 Highest education level: 9th grade

Occupational History

 Occupation: unemployed

Social Needs

 Financial resource strain: Somewhat hard

 Food insecurity:

  Worry: Never true

  Inability: Never true

 Transportation needs:

  Medical: No

  Non-medical: No

Tobacco Use

 Smoking status: Never Smoker

 Smokeless tobacco: Never Used

Substance and Sexual Activity

 Alcohol use: Not Currently

 Drug use: Not on file

 Sexual activity: Not on file

Lifestyle

 Physical activity:

  Days per week: Not on file

  Minutes per session: Not on file

 Stress: Not on file

Relationships

 Social connections:

  Talks on phone: Not on file

  Gets together: Not on file

  Attends Sikh service: Not on file

  Active member of club or organization: Not on file

  Attends meetings of clubs or organizations: Not on file

  Relationship status: Not on file

 Intimate partner violence:

  Fear of current or ex partner: Not on file

  Emotionally abused: Not on file

  Physically abused: Not on file

  Forced sexual activity: Not on file

Other Topics Concern

 Not on file

Social History Narrative

 Not on file



Family History

Problem Relation Age of Onset

 No Significant Medical Problems Mother

 No Significant Medical Problems Father



Review of Systems

Constitutional: Positive for unexpected weight change and weight gain.

HENT: Negative.

Eyes: Negative.

Respiratory: Negative.

Cardiovascular: Negative.

Gastrointestinal: Positive for abdominal pain and constipation.

Genitourinary: Negative.

Musculoskeletal: Negative.

Skin: Negative.

Neurological: Positive for headaches.

Psychiatric/Behavioral: Positive for agitation. The patient is nervous/anxious.

Endocrine: Positive for polyphagia and weight gain.



Vital Signs

/80 (BP Location: Left arm, Patient Position: Sitting, BP CUFF SIZE: 
Adult Medium)  | Pulse 70 | Temp 37.1 C (98.7 F) (Oral)  | Ht 6' 1" (1.854 m
)  | Wt 217 lb (98.4 kg)  | BMI 28.63 kg/m



Physical Exam

Constitutional: He is oriented to person, place, and time. He appears well-
developed and well-nourished. No distress.

HENT:

Head: Normocephalic and atraumatic.

Mouth/Throat: Mucous membranes are normal.

Eyes: Pupils are equal, round, and reactive to light. Conjunctivae are normal. 
No scleral icterus.

Neck: Neck supple. No thyromegaly present.

Cardiovascular: Normal rate, regular rhythm, normal heart sounds and intact 
distal pulses. Exam reveals no gallop and no friction rub.

No murmur heard.

Pulmonary/Chest: Effort normal and breath sounds normal. He has no wheezes. He 
has no rales.

Abdominal: Soft. Bowel sounds are normal. He exhibits distension (very slight). 
He exhibits no mass.There is no tenderness. There is no rebound and no guarding.

Musculoskeletal: He exhibits no edema.

Lymphadenopathy:

  He has no cervical adenopathy.

Neurological: He is alert and oriented to person, place, and time.

Skin: Skin is warm and dry. No rash noted. No pallor.

Psychiatric: His speech is normal. His mood appears anxious. He is agitated and 
hyperactive. Thoughtcontent is paranoid. Thought content is not delusional. 
Cognition and memory are impaired. He expresses impulsivity. He expresses no 
homicidal and no suicidal ideation.

Nursing note and vitals reviewed.



Labs

Admission on 2019, Discharged on 2019

Component Date Value

 NA 2019 140

 K 2019 4.5

 CL 2019 110*

 CO2 TOTAL 2019 22*

 AGAP 2019 8

 BUN 2019 14

 GLUCOSE 2019 362*

 CREATININE 2019 0.58*

 TOTAL BILI 2019 1.9*

 CALCIUM 2019 9.4

 T PROTEIN 2019 7.2

 ALBUMIN 2019 3.5

 ALK PHOS 2019 152*

 ALT(SGPT) 2019 72*

 AST(SGOT) 2019 86*

 eGFR Calculation (Non-Af* 2019 149.5

 eGFR Calculation (Karin* 2019 181.2

 AMMONIA 2019 303*

 WBC 2019 3.48*

 RBC 2019 4.59

 HGB 2019 14.2

 HCT 2019 39.9

 MCV 2019 86.9

 MCH 2019 30.9

 MCHC 2019 35.6*

 RDW-SD 2019 43.8

 RDW-CV 2019 14.0

 PLT 2019 77*

 MPV 2019 12.5

 IPF % 2019 3.9

 NRBC/100 WBC 2019 0.0

 NRBC x10^3 2019 &lt;0.01

 GRAN MAT (NEUT) % 2019 52.9

 IMM GRAN % 2019 0.30

 LYMPH % 2019 27.6

 MONO % 2019 13.8

 EOS % 2019 4.3

 BASO % 2019 1.1

 GRAN MAT x10^3(ANC) 2019 1.84*

 IMM GRAN x10^3 2019 &lt;0.03

 LYMPH x10^3 2019 0.96*

 MONO x10^3 2019 0.48

 EOS x10^3 2019 0.15

 BASO x10^3 2019 0.04

 TEARDROP CELLS 2019 2+*

Admission on 2019, Discharged on 2019

Component Date Value

 NA 2019 140

 K 2019 4.5

 CL 2019 111*

 CO2 TOTAL 2019 21*

 AGAP 2019 8

 BUN 2019 14

 GLUCOSE 2019 329*

 CREATININE 2019 0.61

 TOTAL BILI 2019 2.2*

 CALCIUM 2019 8.5*

 T PROTEIN 2019 7.1

 ALBUMIN 2019 3.4*

 ALK PHOS 2019 152*

 ALT(SGPT) 2019 69*

 AST(SGOT) 2019 80*

 eGFR Calculation (Non-Af* 2019 141.1

 eGFR Calculation (Karin* 2019 171.0

 APPEARANCE 2019 Slightly Hazy*

 COLOR 2019 Yellow

 PH 2019 8.0

 SP GRAVITY 2019 1.015

 GLU U QUAL 2019 &gt;1000 mg/dL*

 BLOOD 2019 Negative

 KETONES 2019 Negative

 PROTEIN 2019 Negative

 UROBILIN 2019 2.0 mg/dL*

 BILIRUBIN 2019 Negative

 NITRITE 2019 Negative

 LEUK BUCKY 2019 Negative

 RBC/HPF 2019 0

 WBC/HPF 2019 1

 BACTERIA 2019 Negative

 AMMONIA 2019 171*

 WBC 2019 3.53*

 RBC 2019 4.65

 HGB 2019 14.3

 HCT 2019 41.2

 MCV 2019 88.6

 MCH 2019 30.8

 MCHC 2019 34.7

 RDW-SD 2019 43.8

 RDW-CV 2019 13.6

 PLT 2019 79*

 MPV 2019 12.3

 IPF % 2019 3.4

 NRBC/100 WBC 2019 0.0

 NRBC x10^3 2019 &lt;0.01

 GRAN MAT (NEUT) % 2019 67.2

 IMM GRAN % 2019 0.30

 LYMPH % 2019 18.1

 MONO % 2019 10.5

 EOS % 2019 2.8

 BASO % 2019 1.1

 GRAN MAT x10^3(ANC) 2019 2.37

 IMM GRAN x10^3 2019 &lt;0.03

 LYMPH x10^3 2019 0.64*

 MONO x10^3 2019 0.37

 EOS x10^3 2019 0.10

 BASO x10^3 2019 0.04

Admission on 2019, Discharged on 2019

Component Date Value

 NA 2019 139

 K 2019 4.0

 CL 2019 112*

 CO2 TOTAL 2019 21*

 AGAP 2019 6

 BUN 2019 14

 GLUCOSE 2019 444*

 CREATININE 2019 0.58*

 TOTAL BILI 2019 1.2*

 CALCIUM 2019 8.0*

 T PROTEIN 2019 5.9*

 ALBUMIN 2019 2.7*

 ALK PHOS 2019 121

 ALT(SGPT) 2019 53*

 AST(SGOT) 2019 66*

 eGFR Calculation (Non-Af* 2019 149.5

 eGFR Calculation (Karin* 2019 181.2

 LIPASE 2019 116

 APPEARANCE 2019 Clear

 COLOR 2019 Yellow

 PH 2019 6.0

 SP GRAVITY 2019 1.015

 GLU U QUAL 2019 &gt;1000 mg/dL*

 BLOOD 2019 Trace*

 KETONES 2019 Negative

 PROTEIN 2019 Negative

 UROBILIN 2019 2.0 mg/dL*

 BILIRUBIN 2019 Negative

 NITRITE 2019 Negative

 LEUK BUCKY 2019 Negative

 RBC/HPF 2019 3

 WBC/HPF 2019 3

 BACTERIA 2019 Negative

 SQ EPITH 2019 2

 PH 2019 7.44*

 PCO2 CHEMA 2019 33*

 PO2 CHEMA 2019 70*

 HCO3 CHEMA 2019 22*

 AC VBE(BEAKER) 2019 -1.7

 WBC 2019 3.12*

 RBC 2019 3.95*

 HGB 2019 12.1*

 HCT 2019 35.3*

 MCV 2019 89.4

 MCH 2019 30.6

 MCHC 2019 34.3

 RDW-SD 2019 44.8

 RDW-CV 2019 13.8

 PLT 2019 70*

 MPV 2019 12.6

 IPF % 2019 3.3

 NRBC/100 WBC 2019 0.0

 NRBC x10^3 2019 &lt;0.01

 GRAN MAT (NEUT) % 2019 51.3

 IMM GRAN % 2019 0.00

 LYMPH % 2019 28.5

 MONO % 2019 15.4

 EOS % 2019 3.8

 BASO % 2019 1.0

 GRAN MAT x10^3(ANC) 2019 1.60*

 IMM GRAN x10^3 2019 &lt;0.03

 LYMPH x10^3 2019 0.89*

 MONO x10^3 2019 0.48

 EOS x10^3 2019 0.12

 BASO x10^3 2019 0.03

 POCT Glu (age&gt;30days) 2019 401*

 POCT Glu (age&gt;30days) 2019 361*

 POCT GLU 2019 401*

 POCT GLU 2019 361*

Admission on 2019, Discharged on 2019

Component Date Value

 APPEARANCE 2019 Clear

 COLOR 2019 Yellow

 PH 2019 6.0

 SP GRAVITY 2019 1.025

 GLU U QUAL 2019 Negative

 BLOOD 2019 Negative

 KETONES 2019 Negative

 PROTEIN 2019 Negative

 UROBILIN 2019 1.0 mg/dL

 BILIRUBIN 2019 Negative

 NITRITE 2019 Negative

 LEUK BUCKY 2019 Negative

 RBC/HPF 2019 0

 WBC/HPF 2019 0

 BACTERIA 2019 Negative

 NA 2019 141

 K 2019 4.6

 CL 2019 113*

 CO2 TOTAL 2019 24

 AGAP 2019 4

 BUN 2019 14

 GLUCOSE 2019 269*

 CREATININE 2019 0.46*

 CALCIUM 2019 8.4*

 eGFR Calculation (Non-Af* 2019 195.4

 eGFR Calculation (Karin* 2019 236.8

 TOTAL BILI 2019 1.4*

 BILI UNCON 2019 1.3*

 BILI CONJ 2019 0.0

 T PROTEIN 2019 6.1*

 ALBUMIN 2019 2.7*

 ALK PHOS 2019 105

 ALT(SGPT) 2019 47

 AST(SGOT) 2019 79*

 LIPASE 2019 181

 TROPONIN I 2019 0.008

 APTT Patient 2019 38

 PROTIME PATIENT 2019 16.6*

 INR 2019 1.4

 ALCOHOL 2019 &lt;10

 BENZO U 2019 Negative

 BECKY U 2019 Negative

 AMPHET 2019 Negative

 THC 2019 Negative

 METHADONE 2019 Negative

 Meth U 2019 Presumptive Positive*

 OPIATES 2019 Negative

 Cocaine Metabolite 2019 Negative

 PROPOXY 2019 Negative

 Tric U 2019 Negative

 PCP 2019 Negative

 OXYCOD 2019 Negative

 WBC 2019 2.30*

 RBC 2019 3.92*

 HGB 2019 12.0*

 HCT 2019 35.3*

 MCV 2019 90.1

 MCH 2019 30.6

 MCHC 2019 34.0

 RDW-SD 2019 45.0

 RDW-CV 2019 14.0

 PLT 2019 68*

 MPV 2019 12.9

 IPF % 2019 3.5

 NRBC/100 WBC 2019 0.0

 NRBC x10^3 2019 &lt;0.01

 GRAN MAT (NEUT) %

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
## 2019-09-02 NOTE — XMS REPORT
Summary of Care

 Created on:2019



Patient:Joe Archuleta

Sex:Male

:1970

External Reference #:[id number]





Demographics







 Address  [address]



   [place] [id number]

 

 Mobile Phone  [phone]

 

 Home Phone  [phone]

 

 Phone  [phone]

 

 Email Address  [email or web address]

 

 Preferred Language  English

 

 Marital Status  

 

 Anabaptism Affiliation  Unknown

 

 Race  White

 

 Ethnic Group   or 









Author







 Organization  [facility]

 

 Address  [address] [place] [address]









Support







 Name  Relationship  Address  Phone

 

 Mandie Blair  Unavailable  [address]  [phone]



     [place] [address]  









Care Team Providers







 Name  Role  Phone

 

 Veronica Huff MD  Primary Care Provider  [phone]









Reason for Visit







 Reason  Comments

 

 Vomiting  



Auth/Cert





 Status  Reason  Specialty  Diagnoses /  Referred By  Referred To



       Procedures  Contact  Contact

 

     Emergency Medicine      [facility]



           Dept







           [address] [place] [address]



           







           [place]



           [address]







           Phone:



           [phone]







           Fax: [phone]









Encounter Details







 Date  Type  Department  Care Team  Description

 

 2019  Emergency  [facility]  Frank Doherty DO



  Hyperammonemia (Primary Dx);



     Department



  [address] [place] [address].



  Chronic fatigue;



     [address] [place] [address] 



  RT 0711



  Type 2 diabetes mellitus without complication, without long-term current use 
of insulin



     [address] [address] [phone] (Fax)  







Allergies

No Known Allergiesdocumented as of this encounter (statuses as of 2019)



Medications

No known medicationsdocumented as of this encounter (statuses as of 2019)



Active Problems







 Problem  Noted Date

 

 Hyperammonemia  2019

 

 Obesity (BMI 30-39.9)  2019

 

 Hepatic encephalopathy  2015

 

 S/P TIPS (transjugular intrahepatic portosystemic shunt)  2015

 

 Hospice care patient  2015

 

 History of CVA (cerebrovascular accident)  2015

 

 History of MI (myocardial infarction)  2015



documented as of this encounter (statuses as of 2019)



Immunizations







 Name  Administration Dates  Next Due

 

 Influenza Virus Vaccine Quad IM 3+ YRS  2015  

 

 Pneumococcal Polysaccharide, PPSV23 (PNEUMOVAX)  2015  



documented as of this encounter



Social History







 Tobacco Use  Types  Packs/Day  Years Used  Date

 

 Never Smoker        









 Smokeless Tobacco: Never Used      









 Education  Answer  Date Recorded

 

 What is the highest level of school you have completed or the  9th grade  



 highest degree you have received?    









 Financial Resource Strain  Answer  Date Recorded

 

 How hard is it for you to pay for the very basics like  Somewhat hard  2019



 food, housing, medical care, and heating?    









 Food Insecurity  Answer  Date Recorded

 

 Within the past 12 months, you worried that your food would  Never true  2019



 run out before you got money to buy more.    

 

 Within the past 12 months, the food you bought just didn't  Never true  2019



 last and you didn't have money to get more.    









 Transportation Needs  Answer  Date Recorded

 

 In the past 12 months, has lack of transportation kept you from  No  2019



 medical appointments or from getting medications?    

 

 In the past 12 months, has lack of transportation kept you from  No  2019



 meetings, work, or getting things needed for daily living?    









 Sex Assigned at Birth  Date Recorded

 

 Not on file  









 Job Start Date  Occupation  Industry

 

 Not on file  Not on file  Not on file









 Travel History  Travel Start  Travel End









 No recent travel history available.



documented as of this encounter



Last Filed Vital Signs







 Vital Sign  Reading  Time Taken  Comments

 

 Blood Pressure  135/77  2019  9:50 AM CDT  

 

 Pulse  67  2019  9:50 AM CDT  

 

 Temperature  36.8 C (98.2 F)  2019  8:20 AM CDT  

 

 Respiratory Rate  16  2019  9:50 AM CDT  

 

 Oxygen Saturation  96%  2019  9:50 AM CDT  

 

 Inhaled Oxygen Concentration  -  -  

 

 Weight  108.9 kg (240 lb)  2019  8:20 AM CDT  

 

 Height  185.4 cm (6' 1")  2019  8:20 AM CDT  

 

 Body Mass Index  31.66  2019  8:20 AM CDT  



documented in this encounter



Discharge Instructions

Frank Low DO - 2019Formatting of this note might be 
different from the original.

DIAGNOSIS

Diagnoses that have been ruled out:

None

Diagnoses that are still under consideration:

None

Final diagnoses:

Hyperammonemia

Chronic fatigue

Type 2 diabetes mellitus without complication, without long-term current use of 
insulin



NO LIFE-THREATENING FINDINGS ON TODAY'S EXAM.



PROCEDURES IN THE ER TODAY:

Orders Placed This Encounter

Procedures

 CBC WITH DIFF

 COMP. METABOLIC PANEL (99640)

 AMMONIA, PLASMA

 URINALYSIS

 CBC WITH DIFFERENTIAL



MEDICATIONS ADMINISTERED IN THE ER TODAY AND DISCHARGE MEDICATIONS:

Orders Placed This Encounter

Medications

 NaCl 0.9% (NS) bolus infusion 1,000 mL

 lactulose 300 mL (Added to water or saline 700 mL) enema 1 Enema

 DISCONTD: insulin NPH (HUMULIN N) injection 5 Units

 DISCONTD: insulin regular human (HUMULIN R) injection 5 Units

 insulin regular human (HUMULIN R) injection 5 Units



FOLLOW-UP RECOMMENDATIONS:

RECOMMEND FOLLOW-UP WITH A PRIMARY CARE PROVIDER OR SPECIALIST IN 2-5 DAYS, 
ESPECIALLY IF NO IMPROVEMENT IN SYMPTOMS.



MAY FOLLOW-UP WITH A PROVIDER OF YOUR CHOICE, SUCH AS:

1. A PHYSICIAN OF YOUR CHOICE

2. [facility], [phone]. LOCATIONS IN [facility]

3. [facility], [address] [place] [address]; [phone]
[phone]



OR, IF YOU WISH TO FOLLOW-UP WITHIN THE [facility], MAY TRY THESE 
OPTIONS (CLINIC APPOINTMENTS AVAILABLE ON CASE-BY-CASE BASIS):

1. SCHEDULE AN APPOINTMENT ONLINE AT [email or web address]

2. OR CALL THE [facility] ACCESS [facility] AT [phone] OR [phone]

3. OR CALL YOUR [facility] PHYSICIAN'S OFFICE DIRECTLY IF YOU ARE ALREADY AN 
ESTABLISHED [facility] PATIENT.



RETURN TO ER FOR WORSENING OF SYMPTOMS.



documented in this encounter



Plan of Treatment







 Date  Type  Specialty  Care Team  Description

 

 2019  Office Visit  Family Medicine  Veronica Huff MD



  



       [address] [place] [address] 



  



       [place] [address] [phone] (Fax)  









 Name  Type  Priority  Associated Diagnoses  Order Schedule

 

 URINALYSIS  LAB  Routine  Hyperammonemia



  ONCE for 1 Occurrences



       Chronic fatigue  starting 2019 until



         2019









 Health Maintenance  Due Date  Last Done  Comments

 

 DTaP,Tdap,and Td Vaccines (1 - Tdap)  1990    

 

 INFLUENZA VACCINE  2019  2015  

 

 PNEUMOCOCCAL 0-64 YEARS COMBINED SERIES  Completed  2015  



documented as of this encounter



Procedures







 Procedure Name  Priority  Date/Time  Associated  Comments



       Diagnosis  

 

 CBC WITH DIFFERENTIAL  STAT  2019  9:01  Hyperammonemia



  Results for this



     AM CDT  Chronic fatigue  procedure are in



         the results



         section.

 

 CBC WITH DIFF  STAT  2019  9:01  Hyperammonemia



  Results for this



     AM CDT  Chronic fatigue  procedure are in



         the results



         section.

 

 COMP. METABOLIC PANEL  STAT  2019  9:01  Hyperammonemia



  Results for this



 ([address])    AM CDT  Chronic fatigue  procedure are in



         the results



         section.

 

 AMMONIA, PLASMA  STAT  2019  9:01  Hyperammonemia



  Results for this



     AM CDT  Chronic fatigue  procedure are in



         the results



         section.

 

 NOTICE OF PRIVACY  Routine  2019  8:16    



 PRACTICES    AM CDT    

 

 CONSENT/REFUSAL FOR  Routine  2019  8:15    



 DIAGNOSIS AND    AM CDT    



 TREATMENT        



documented in this encounter



Results

CBC WITH DIFFERENTIAL (2019  9:01 AM CDT)





 Component  Value  Ref Range  Performed At  Pathologist



         Signature

 

 WBC  3.48 (L)  4.20 - 10.70  [facility]  



     10*3/L  [facility]  



       LABORATORY  

 

 RBC  4.59  4.26 - 5.52  [facility]  



     10*6/L  [facility]  



       LABORATORY  

 

 HGB  14.2  12.2 - 16.4  [facility]  



     g/dL  [facility]  



       LABORATORY  

 

 HCT  39.9  38.4 - 49.3 %  [facility]  



       LABORATORY  

 

 MCV  86.9  81.7 - 95.6  [facility]  



       LABORATORY  

 

 MCH  30.9  26.1 - 32.7  [facility]  



       LABORATORY  

 

 MCHC  35.6 (H)  31.2 - 35.0  [facility]  



     g/dL  [facility]  



       LABORATORY  

 

 RDW-SD  43.8  38.5 - 51.6  [facility]  



       LABORATORY  

 

 RDW-CV  14.0  12.1 - 15.4 %  [facility]  



       LABORATORY  

 

 PLT  77 (L)  150 - 328  [facility]  



     10*3/L  [facility]  



       LABORATORY  

 

 MPV  12.5  9.8 - 13.0 [facility]  



       LABORATORY  

 

 IPF %  3.9Comment: Platelet  1.2 - 10.7 %  [facility]  



   count measured by    HOSPITAL  



   fluorescence method.    LABORATORY  

 

 NRBC/100 WBC  0.0  0.0 - 10.0  [facility]  



     /100 WBCs  HOSPITAL  



       LABORATORY  

 

 NRBC x10^3  <0.01  10*3/L  [facility]  



       LABORATORY  

 

 GRAN MAT (NEUT) %  52.9  %  [facility]  



       LABORATORY  

 

 IMM GRAN %  0.30  %  [facility]  



       LABORATORY  

 

 LYMPH %  27.6  %  [facility]  



       LABORATORY  

 

 MONO %  13.8  %  [facility]  



       LABORATORY  

 

 EOS %  4.3  %  [facility]  



       LABORATORY  

 

 BASO %  1.1  %  [facility]  



       LABORATORY  

 

 GRAN MAT  1.84 (L)  1.99 - 6.95  [facility]  



 x10^3(ANC)    10*3/uL  HOSPITAL  



       LABORATORY  

 

 IMM GRAN x10^3  <0.03  0.00 - 0.06  [facility]  



     10*3/uL  HOSPITAL  



       LABORATORY  

 

 LYMPH x10^3  0.96 (L)  1.09 - 3.23  [facility]  



     10*3/uL  HOSPITAL  



       LABORATORY  

 

 MONO x10^3  0.48  0.36 - 1.02  [facility]  



     10*3/uL  HOSPITAL  



       LABORATORY  

 

 EOS x10^3  0.15  0.06 - 0.53  [facility]  



     10*3/uL  HOSPITAL  



       LABORATORY  

 

 BASO x10^3  0.04  0.01 - 0.09  [facility]  



     10*3/uL  HOSPITAL  



       LABORATORY  

 

 TEARDROP CELLS  2+ (A)  (none)  [facility]  



       LABORATORY  









 Specimen

 

 Blood - WRIST, RIGHT









 Performing Organization  Address  City/[facility]/Zipcode  Phone Number

 

 [facility]  CLIA: [id number], [address]  [place] [address]  



 LABORATORY  [address]    



AMMONIA, PLASMA (2019  9:01 AM CDT)





 Component  Value  Ref Range  Performed At  Pathologist Signature

 

 AMMONIA  303 (H)  9 - 33 umol/L  [facility]  



       LABORATORY  









 Specimen

 

 Blood - WRIST, RIGHT









 Performing Organization  Address  City/[facility]/Zipcode  Phone Number

 

 [facility]  CLIA: [id number], [address]  [place] [address]  



 LABORATORY  [address]    



COMP. METABOLIC PANEL (17378) (2019  9:01 AM CDT)





 Component  Value  Ref Range  Performed At  Pathologist



         Signature

 

 NA  140  135 - 145  [facility]  



     mmol/L  [facility] LABORATORY  

 

 K  4.5  3.5 - 5.0  [facility]  



     mmol/L  [facility] LABORATORY  

 

 CL  110 (H)  98 - 108 mmol/L  [facility] LABORATORY  

 

 CO2 TOTAL  22 (L)  23 - 31 mmol/L  [facility] LABORATORY  

 

 AGAP  8  2 - 16  [facility] LABORATORY  

 

 BUN  14  7 - 23 mg/dL  [facility] LABORATORY  

 

 GLUCOSE  362 (H)  70 - 110 mg/dL  [facility] LABORATORY  

 

 CREATININE  0.58 (L)  0.60 - 1.25  [facility]  



     mg/dL  [facility] LABORATORY  

 

 TOTAL BILI  1.9 (H)  0.1 - 1.1 mg/dL  [facility] LABORATORY  

 

 CALCIUM  9.4  8.6 - 10.6  [facility]  



     mg/dL  [facility] LABORATORY  

 

 T PROTEIN  7.2  6.3 - 8.2 g/dL  [facility] LABORATORY  

 

 ALBUMIN  3.5  3.5 - 5.0 g/dL  [facility] LABORATORY  

 

 ALK PHOS  152 (H)  34 - 122 U/L  [facility] LABORATORY  

 

 ALT(SGPT)  72 (H)  9 - 51 U/L  [facility] LABORATORY  

 

 AST(SGOT)  86 (H)  13 - 40 U/L  [facility] LABORATORY  

 

 eGFR Calculation  149.5  mL/min/1.73m2  [facility]  



 (Non-[facility] LABORATORY  



 American)        

 

 eGFR Calculation  181.2  mL/min/1.73m2  [facility]  



 ()      [facility] LABORATORY  









 Specimen

 

 Blood - WRIST, RIGHT









 Narrative  Performed At

 

 Association of Glomerular Filtration Rate (GFR)  [facility] 
LABORATORY



 and Staging of Kidney Disease*



  



 +-----------------------+---------------------+-  



 ------------------------+



  



 | GFR (mL/min/1.73 m2)| With Kidney  



 Damage|Without Kidney Damage



  



 +-----------------------+---------------------+-  



 ------------------------+



  



 |>90|  



 Stage one|  



 Normal



  



 +-----------------------+---------------------+-  



 ------------------------+



  



 |60-89|S  



 tage two| Decreased  



 GFR 



  



 +-----------------------+---------------------+-  



 ------------------------+



  



 |30-59|S  



 tage three| Stage  



 three 



  



 +-----------------------+---------------------+-  



 ------------------------+



  



 |15-29|S  



 tage four | Stage  



 four



  



 +-----------------------+---------------------+-  



 ------------------------+



  



 |<15 (or dialysis)|Stage  



 five | Stage  



 five



  



 +-----------------------+---------------------+-  



 ------------------------+



  



 *Each stage assumes the associated GFR level has  



 been in effect for at least three  



 months.Stages 1 to 5, with or without kidney  



 disease, indicate chronic kidney disease.



  



 Notes: Determination of stages one and two (with  



 eGFR >59mL/min/1.73 m2) requires estimation of  



 kidney damage for at least three months as  



 defined by structural or functional  



 abnormalities of the kidney, manifested by  



 either:



  



 Pathological abnormalities or Markers of kidney  



 damage (including abnormalities in the  



 composition of the blood or urine or  



 abnormalities in imaging tests).  









 Performing Organization  Address  City/State/Zipcode  Phone Number

 

 [facility]  CLIA: [id number], [address]  [place] [address]  



 LABORATORY  [address]    



documented in this encounter



Visit Diagnoses







 Diagnosis

 

 Hyperammonemia - Primary







 Disorders of urea cycle metabolism

 

 Chronic fatigue







 Other malaise and fatigue

 

 Type 2 diabetes mellitus without complication, without long-term current use of



 insulin



documented in this encounter



Administered Medications







 Medication Order  MAR Action  Action Date  Dose  Rate  Site

 

 lactulose 300 mL (Added to water  Given  2019  9:30 AM CDT  1 Enema    



 or saline 700 mL) enema 1 Enema



          



 1 Enema, Rectal, Q4HPRN, Starting          



 Tue 19 at 0850, Until          



 Discontinued, Routine,          



 Encephalophathy          









   









 









 Medication Order  MAR Action  Action Date  Dose  Rate  Site

 

 insulin regular human  Given  2019 10:13 AM CDT  5 Units    Abdomen-SC



 (HUMULIN R) injection 5          



 Units



          



 5 Units, Subcutaneous,          



 ONCE, 1 dose, Tue 19          



 at 1115, ASAP          









   









 NaCl 0.9% (NS) bolus infusion  New Bag  2019  9:05 AM CDT  1,000 mL  999 
mL/hr  



 1,000 mL



          



 at 999 mL/hr, 1,000 mL, IV          



 Piggyback, ONCE, 1 dose, Tue          



 19 at 1000, STAT          









   



documented in this encounter



Insurance







 Payer  Benefit Plan /  Subscriber ID  Effective Dates  Phone  Address  Type



   Group          

 

 [facility]  MEDICAID OF  xxxxxxxxx  2019-Present  [phone]  [address]  Medicaid



   [address]  



           [address]  









 Guarantor Name  Account Type  Relation to  Date of Birth  Phone  Billing



     Patient      Address

 

 Joe Archuleta  Personal/Family  Self  1970  [phone]  [address]







         (Home)  [place]



           [address]



documented as of this encounter

## 2019-09-02 NOTE — XMS REPORT
Summary of Care

 Created on:2019



Patient:Joe Archuleta

Sex:Male

:1970

External Reference #:[id number]





Demographics







 Address  [address] [id number]

 

 Mobile Phone  [phone]

 

 Home Phone  [phone]

 

 Phone  [phone]

 

 Email Address  [email or web address]

 

 Preferred Language  English

 

 Marital Status  

 

 Adventism Affiliation  Unknown

 

 Race  White

 

 Ethnic Group   or 









Author







 Organization  [facility]

 

 Address  [address] [place] [address]









Support







 Name  Relationship  Address  Phone

 

 Mandie Blair  Unavailable  [address]  [phone]



     [place] [address]  









Care Team Providers







 Name  Role  Phone

 

 Pcp, Patient Does Not Have A  Primary Care Provider  [phone]









Reason for Referral

Radiology Services (STAT)





 Status  Reason  Specialty  Diagnoses /  Referred By  Referred To



       Procedures  Contact  Contact

 

 New Request    Diagnostic  Diagnoses



Confusion  Laura Richard,  



     Radiology  



Procedures



XR CHEST 1   PAC



  



         [address] [place] [address]  



         [place]  



         [address]



  



         Phone:  



         [phone]



  



         Fax:  



         [phone]  





Radiology Services (STAT)





 Status  Reason  Specialty  Diagnoses /  Referred By  Referred To



       Procedures  Contact  Contact

 

 New Request    Diagnostic  Diagnoses



Confusion  Laura Richard,  



     Radiology  



Procedures



XR CHEST 1 [address]  



         [place]  



         [address]



  



         Phone:  



         [phone]



  



         Fax:  



         [phone]  









Reason for Visit







 Reason  Comments

 

 Confusion  



Auth/Cert





 Status  Reason  Specialty  Diagnoses /  Referred By  Referred To



       Procedures  Contact  Contact

 

     Emergency Medicine      [facility]



           Dept







           [address] [place] [address]



           [place]



           [address]







           Phone:



           [phone]







           Fax: [phone]









Encounter Details







 Date  Type  Department  Care Team  Description

 

 2019  Emergency  [facility]



  Laura Richard, PAC



[address] [place] [address] [place] BEAL [id number] [phone] (Fax)  Hyperammonemia



     [address] [place] [address] Fernandez Ayers, STACY



[address] [place] [address]



[phone] (Fax)  



     [place] [address]



    



     [phone]    







Allergies

No Known Allergiesdocumented as of this encounter (statuses as of 2019)



Medications







 Medication  Sig  Dispensed  Refills  Start Date  End Date  Status

 

 traMADOL (ULTRAM)  Take 1 Tab by  30 Tab  0  2015  
Discontinued



 50 mg tablet  mouth every 6        9  



   (six) hours as          



   needed for Pain          



   unrelieved by          



   non-narcotic          



   analgesics.          

 

 proMETHazine  Take 1 Tab by  20 Tab  0  2015  Discontinued



 (PHENERGAN) 25 mg  mouth every 6        9  



 tablet  (six) hours as          



   needed for          



   Nausea and          



   Vomiting (N/V).          

 

 ALPRAZOLAM (XANAX  Take  by mouth.    0      Discontinued



 ORAL)          9  

 

 MORPHINE SULFATE  Take  by mouth.    0      Discontinued



 (MORPHINE ORAL)          9  

 

 OXYCODONE HCL  Take  by mouth.    0      Discontinued



 (OXYCODONE ORAL)          9  

 

 Bisacodyl  Take 1 Tab by  30 Tab  2  10/04/2015  07/26/201  Discontinued



 (CORRECTOL) 5 mg  mouth 2 (two)        9  



 Tab  times daily          



   with meals as          



   needed for          



   Constipation.          

 

 esomeprazole  Take 1 capsule  20 capsule  11  2019  
Discontinued



 (NEXIUM) 40 mg  by mouth daily        9  



 capsuleIndications:  with breakfast.          



 Fatigue,            



 unspecified type,            



 Encounter for            



 medication            



 management            

 

 metFORMIN 500 mg  Take 1 tablet  30 tablet  0  2019  
Discontinued



 tabletIndications:  by mouth daily.        9  



 Hepatic            



 encephalopathy            

 

 furosemide 40 mg  Take 1 tablet  30 tablet  0  2019  
Discontinued



 tabletIndications:  by mouth daily.        9  



 Hepatic            



 encephalopathy            

 

 spironolactone 100  Take 1 tablet  30 tablet  1  2019  
Discontinued



 mg  by mouth daily.        9  



 tabletIndications:            



 Hepatic            



 encephalopathy            

 

 lactulose 10  Take 30 mL by  1 Bottle  2  2019  Discontinued



 gram/15 mL  mouth 3 (three)        9  



 solutionIndications  times daily.          



 : Fatigue,  For 2-3 bowel          



 unspecified type,  movements daily          



 Encounter for            



 medication            



 management            

 

 busPIRone 10 mg  Take 1 tablet  60 tablet  0  2019  
Discontinued



 tabletIndications:  by mouth 2        9  



 Hepatic  (two) times          



 encephalopathy  daily.          

 

 pantoprazole 40 mg  Take 1 tablet  30 tablet  0  2019  
Discontinued



 EC  by mouth daily.        9  



 tabletIndications:            



 Hepatic            



 encephalopathy            

 

 lactulose 300 mL 10  Use 1 enema per  1 Bottle  2  2019  
Discontinued



 g/15 mL  rectum daily as        9  



 enemaIndications:  needed if no          



 Hepatic  response with          



 encephalopathy  oral lactulose          

 

 ondansetron  Take 1 tablet  12 tablet  0  2019  Discontinued



 (ZOFRAN) 4 mg  by mouth every        9  



 tabletIndications:  8 (eight) hours          



 Uncontrolled type 2  as needed for          



 diabetes mellitus  Nausea and          



 with hyperglycemia  Vomiting (N/V).          



documented as of this encounter (statuses as of 2019)



Active Problems







 Problem  Noted Date

 

 Hyperammonemia  2019

 

 Hepatic encephalopathy  2015

 

 S/P TIPS (transjugular intrahepatic portosystemic shunt)  2015

 

 Hospice care patient  2015

 

 History of CVA (cerebrovascular accident)  2015

 

 History of MI (myocardial infarction)  2015



documented as of this encounter (statuses as of 2019)



Immunizations







 Name  Administration Dates  Next Due

 

 Influenza Virus Vaccine Quad IM 3+ YRS  2015  

 

 Pneumococcal Polysaccharide, PPSV23 (PNEUMOVAX)  2015  



documented as of this encounter



Social History







 Tobacco Use  Types  Packs/Day  Years Used  Date

 

 Never Smoker        









 Smokeless Tobacco: Never Used      









 Education  Answer  Date Recorded

 

 What is the highest level of school you have completed or the  9th grade  



 highest degree you have received?    









 Financial Resource Strain  Answer  Date Recorded

 

 How hard is it for you to pay for the very basics like  Somewhat hard  2019



 food, housing, medical care, and heating?    









 Food Insecurity  Answer  Date Recorded

 

 Within the past 12 months, you worried that your food would  Never true  2019



 run out before you got money to buy more.    

 

 Within the past 12 months, the food you bought just didn't  Never true  2019



 last and you didn't have money to get more.    









 Transportation Needs  Answer  Date Recorded

 

 In the past 12 months, has lack of transportation kept you from  No  2019



 medical appointments or from getting medications?    

 

 In the past 12 months, has lack of transportation kept you from  No  2019



 meetings, work, or getting things needed for daily living?    









 Sex Assigned at Birth  Date Recorded

 

 Not on file  









 Job Start Date  Occupation  Industry

 

 Not on file  Not on file  Not on file









 Travel History  Travel Start  Travel End









 No recent travel history available.



documented as of this encounter



Last Filed Vital Signs







 Vital Sign  Reading  Time Taken  Comments

 

 Blood Pressure  121/79  2019 12:56 PM CDT  

 

 Pulse  66  2019 12:56 PM CDT  

 

 Temperature  36.4 C (97.6 F)  2019 12:56 PM CDT  

 

 Respiratory Rate  18  2019 12:56 PM CDT  

 

 Oxygen Saturation  96%  2019 12:56 PM CDT  

 

 Inhaled Oxygen Concentration  -  -  

 

 Weight  104.3 kg (230 lb)  2019 11:13 AM CDT  

 

 Height  185.4 cm (6' 1")  2019 11:13 AM CDT  

 

 Body Mass Index  30.34  2019 11:13 AM CDT  



documented in this encounter



Progress Notes

Fernandez Rees MBBS - 2019  2:51 PM CDTPt admitted with elevated ammonia 
from ER. Left AMA before I could see him. No H and P or discharge summary 
possible and patient not  billed by me for E and MElectronically signed by Fernandez Rees MBBS at 2019  2:52 PM CDTdocumented in this encounter



Plan of Treatment







 Date  Type  Specialty  Care Team  Description

 

 2019  Office Visit  Family Medicine  Veronica Huff MD



  



       [address] [place] [address] [place] [address] [phone] (Fax)  









 Health Maintenance  Due Date  Last Done  Comments

 

 DTaP,Tdap,and Td Vaccines (1 - Tdap)  1990    

 

 INFLUENZA VACCINE  2019  

 

 PNEUMOCOCCAL 0-64 YEARS COMBINED SERIES  Completed  2015  



documented as of this encounter



Procedures







 Procedure Name  Priority  Date/Time  Associated  Comments



       Diagnosis  

 

 URINALYSIS  STAT  2019 12:22  Confusion  Results for this



     PM CDT    procedure are in



         the results



         section.

 

 XR CHEST 1 VW  STAT  2019 11:48  Confusion  Results for this



     AM CDT    procedure are in



         the results



         section.

 

 CBC WITH DIFFERENTIAL  STAT  2019 11:41  Confusion  Results for this



     AM CDT    procedure are in



         the results



         section.

 

 CBC WITH DIFF  STAT  2019 11:41  Confusion  Results for this



     AM CDT    procedure are in



         the results



         section.

 

 COMP. METABOLIC PANEL  STAT  2019 11:41  Confusion  Results for this



 ([address])    AM CDT    procedure are in



         the results



         section.

 

 AMMONIA, PLASMA  STAT  2019 11:41  Confusion  Results for this



     AM CDT    procedure are in



         the results



         section.

 

 NOTICE OF PRIVACY  Routine  2019 10:34    



 PRACTICES    AM CDT    



documented in this encounter



Results

URINALYSIS (2019 12:22 PM CDT)





 Component  Value  Ref Range  Performed At  Pathologist Signature

 

 APPEARANCE  Slightly Hazy (A)  Clear  [facility]  

 

 COLOR  Yellow  Yellow  [facility]  

 

 PH  8.0  4.8 - 8.0  [facility]  

 

 SP GRAVITY  1.015  1.003 - 1.030  [facility]  

 

 GLU U QUAL  >1000 mg/dL (A)  Negative  [facility]  

 

 BLOOD  Negative  Negative  [facility]  

 

 KETONES  Negative  Negative  [facility]  

 

 PROTEIN  Negative  Negative  [facility]  

 

 UROBILIN  2.0 mg/dL (A)  0-1.0 mg/dL  [facility]  

 

 BILIRUBIN  Negative  Negative  [facility]  

 

 NITRITE  Negative  Negative  [facility]  

 

 LEUK BUCKY  Negative  Negative  [facility]  

 

 RBC/HPF  0  0 - 3 HPF  [facility]  

 

 WBC/HPF  1  0 - 5 HPF  [facility]  

 

 BACTERIA  Negative  Negative  [facility]  









 Specimen

 

 Urine - URINE, CLEAN CATCH









 Performing Organization  Address  City/State/Zipcode  Phone Number

 

 [facility]  CLIA: [id number], [address]  [place] [address]  



 LABORATORY  [address]    



XR CHEST 1 VW (2019 11:48 AM CDT)





 Specimen

 

 









 Narrative  Performed At

 

 HISTORY: Confusion.



  PACS/VR/DOSE



  



  



 TECHNIQUE: Portable AP erect view of the chest is obtained. No prior  



 chest



  



 study available for comparison.



  



  



  



 FINDINGS: No acute pneumonia. No pneumothorax or pleural effusion or



  



 pulmonary congestion detected. Cardiac size is within normal limits.



  



 Dilated central pulmonary arteries noted without jose pulmonary edema.



  



 Old fractures of some of the right upper and middle ribs noted.



  



 Embolization coils are seen projected over the epigastric region and



  



 metallic stent in the liver noted, utilized for TIPS shunting.



  



  



  



 CONCLUSIONS: No signs of acute cardiopulmonary disease.



  



   









 Procedure Note

 

 [facility], Radiant Results Inft User - 2019 11:51 AM CDT



HISTORY: Confusion.



 



 TECHNIQUE: Portable AP erect view of the chest is obtained. No prior chest



 study available for comparison.



 



 FINDINGS: No acute pneumonia. No pneumothorax or pleural effusion or



 pulmonary congestion detected. Cardiac size is within normal limits.



 Dilated central pulmonary arteries noted without jose pulmonary edema.



 Old fractures of some of the right upper and middle ribs noted.



 Embolization coils are seen projected over the epigastric region and



 metallic stent in the liver noted, utilized for TIPS shunting.



 



 CONCLUSIONS: No signs of acute cardiopulmonary disease.









 Performing Organization  Address  City/State/Zipcode  Phone Number

 

 PACS/VR/DOSE      



CBC WITH DIFFERENTIAL (2019 11:41 AM CDT)





 Component  Value  Ref Range  Performed At  Pathologist



         Signature

 

 WBC  3.53 (L)  4.20 - 10.70  [facility]  



     10*3/L  [facility]  



       LABORATORY  

 

 RBC  4.65  4.26 - 5.52  [facility]  



     10*6/L  [facility]  



       LABORATORY  

 

 HGB  14.3  12.2 - 16.4  [facility]  



     g/dL  [facility]  



       LABORATORY  

 

 HCT  41.2  38.4 - 49.3 %  [facility]  



       LABORATORY  

 

 MCV  88.6  81.7 - 95.6  [facility]  



       LABORATORY  

 

 MCH  30.8  26.1 - 32.7  [facility]  



       LABORATORY  

 

 MCHC  34.7  31.2 - 35.0  [facility]  



     g/dL  [facility]  



       LABORATORY  

 

 RDW-SD  43.8  38.5 - 51.6  [facility]  



       LABORATORY  

 

 RDW-CV  13.6  12.1 - 15.4 %  [facility]  



       LABORATORY  

 

 PLT  79 (L)  150 - 328  [facility]  



     10*3/L  [facility]  



       LABORATORY  

 

 MPV  12.3  9.8 - 13.0 fL  [facility]  



       LABORATORY  

 

 IPF %  3.4Comment: Platelet  1.2 - 10.7 %  [facility]  



   count measured by    HOSPITAL  



   fluorescence method.    LABORATORY  

 

 NRBC/100 WBC  0.0  0.0 - 10.0  [facility]  



     /100 WBCs  HOSPITAL  



       LABORATORY  

 

 NRBC x10^3  <0.01  10*3/L  [facility]  



       LABORATORY  

 

 GRAN MAT (NEUT) %  67.2  %  [facility]  



       LABORATORY  

 

 IMM GRAN %  0.30  %  [facility]  



       LABORATORY  

 

 LYMPH %  18.1  %  [facility]  



       LABORATORY  

 

 MONO %  10.5  %  [facility]  



       LABORATORY  

 

 EOS %  2.8  %  [facility]  



       LABORATORY  

 

 BASO %  1.1  %  [facility]  



       LABORATORY  

 

 GRAN MAT  2.37  1.99 - 6.95  [facility]  



 x10^3(ANC)    10*3/uL  [facility]  



       LABORATORY  

 

 IMM GRAN x10^3  <0.03  0.00 - 0.06  [facility]  



     10*3/uL  HOSPITAL  



       LABORATORY  

 

 LYMPH x10^3  0.64 (L)  1.09 - 3.23  [facility]  



     10*3/uL  HOSPITAL  



       LABORATORY  

 

 MONO x10^3  0.37  0.36 - 1.02  [facility]  



     10*3/uL  HOSPITAL  



       LABORATORY  

 

 EOS x10^3  0.10  0.06 - 0.53  [facility]  



     10*3/uL  HOSPITAL  



       LABORATORY  

 

 BASO x10^3  0.04  0.01 - 0.09  [facility]  



     10*3/uL  [facility]  



       LABORATORY  









 Specimen

 

 Blood - ARM, RIGHT









 Performing Organization  Address  City/[facility]  Phone Number

 

 [facility]  CLIA: [id number], [address]  [place] [address]  



 LABORATORY  [address]    



AMMONIA, PLASMA (2019 11:41 AM CDT)





 Component  Value  Ref Range  Performed At  Pathologist Signature

 

 AMMONIA  171 (H)  9 - 33 umol/L  [facility]  



       LABORATORY  









 Specimen

 

 Blood - ARM, RIGHT









 Performing Organization  Address  City/[facility]  Phone Number

 

 [facility]  CLIA: [id number], [address]  [place] [address]  



 LABORATORY  [address]    



COMP. METABOLIC PANEL ([address]6640) (2019 11:41 AM CDT)





 Component  Value  Ref Range  Performed At  Pathologist Signature

 

 NA  140  135 - 145  [facility]  



     mmol/L  [facility]  

 

 K  4.5  3.5 - 5.0  [facility]  



     mmol/L  [facility]  

 

 CL  111 (H)  98 - 108 mmol/L  [facility]  

 

 CO2 TOTAL  21 (L)  23 - 31 mmol/L  [facility]  

 

 AGAP  8  2 - 16  [facility]  

 

 BUN  14  7 - 23 mg/dL  [facility]  

 

 GLUCOSE  329 (H)  70 - 110 mg/dL  [facility]  

 

 CREATININE  0.61  0.60 - 1.25  [facility]  



     mg/dL  [facility]  

 

 TOTAL BILI  2.2 (H)  0.1 - 1.1 mg/dL  [facility]  

 

 CALCIUM  8.5 (L)  8.6 - 10.6  [facility]  



     mg/dL  HOSPITAL LAB[facility]ATORY  

 

 T PROTEIN  7.1  6.3 - 8.2 g/dL  [facility]  

 

 ALBUMIN  3.4 (L)  3.5 - 5.0 g/dL  [facility]  

 

 ALK PHOS  152 (H)  34 - 122 U/L  [facility]  

 

 ALT(SGPT)  69 (H)  9 - 51 U/L  [facility]  

 

 AST(SGOT)  80 (H)  13 - 40 U/L  [facility]  

 

 eGFR Calculation  141.1  mL/min/1.73m2  [facility]  



 ([facility]  



 American)        

 

 eGFR Calculation  171.0  mL/min/1.73m2  [facility]  



 ()      [facility]  









 Specimen

 

 Blood - ARM, RIGHT









 Narrative  Performed At

 

 Association of Glomerular Filtration Rate (GFR)  [facility] 
LABORATORY



 and Staging of Kidney Disease*



  



 +-----------------------+---------------------+-  



 ------------------------+



  



 | GFR (mL/min/1.73 m2)| With Kidney  



 Damage|Without Kidney Damage



  



 +-----------------------+---------------------+-  



 ------------------------+



  



 |>90|  



 Stage one|  



 Normal



  



 +-----------------------+---------------------+-  



 ------------------------+



  



 |60-89|S  



 tage two| Decreased  



 GFR 



  



 +-----------------------+---------------------+-  



 ------------------------+



  



 |30-59|S  



 tage three| Stage  



 three 



  



 +-----------------------+---------------------+-  



 ------------------------+



  



 |15-29|S  



 tage four | Stage  



 four



  



 +-----------------------+---------------------+-  



 ------------------------+



  



 |<15 (or dialysis)|Stage  



 five | Stage  



 five



  



 +-----------------------+---------------------+-  



 ------------------------+



  



 *Each stage assumes the associated GFR level has  



 been in effect for at least three  



 months.Stages 1 to 5, with or without kidney  



 disease, indicate chronic kidney disease.



  



 Notes: Determination of stages one and two (with  



 eGFR >59mL/min/1.73 m2) requires estimation of  



 kidney damage for at least three months as  



 defined by structural or functional  



 abnormalities of the kidney, manifested by  



 either:



  



 Pathological abnormalities or Markers of kidney  



 damage (including abnormalities in the  



 composition of the blood or urine or  



 abnormalities in imaging tests).  









 Performing Organization  Address  City/State/Zipcode  Phone Number

 

 [facility]  CLIA: [id number], [address]  [place] [address]  



 [employer]  [address]    



documented in this encounter



Visit Diagnoses







 Diagnosis

 

 Confusion - Primary







 Unspecified psychosis



documented in this encounter



Administered Medications







 Medication Order  MAR Action  Action Date  Dose  Rate  Site

 

 lactulose (CEPHULAC) solution 30  Given  2019  2:40 PM CDT  30 mL    



 mL



          



 30 mL, Oral, TID, First dose on          



 19 at 1345, Until          



 Discontinued, Routine          









   









 









 Medication Order  MAR Action  Action Date  Dose  Rate  Site

 

 ondansetron (ZOFRAN (PF)) injection  Given  2019 12:22 PM CDT  4 mg    



 4 mg



          



 4 mg, Slow IV Push, ONCE, 1 dose,          



 19 at 1330, ASAP          









   



documented in this encounter



Insurance







 Payer  Benefit Plan /  Subscriber ID  Effective Dates  Phone  Address  Type



   Group          

 

 [employer]  xxxxxxxxx  2019-Present  [phone]  [address]  [place]icaid [place]        [id number] [place] [address]  



           [address]  









 Guarantor Name  Account Type  Relation to  Date of Birth  Phone  Billing



     Patient      Address

 

 Joe Archuleta  Personal/Family  Self  1970  [phone]  [address] [place] [address]







         (Home)  [place]



           [address]



documented as of this encounter

## 2019-09-02 NOTE — XMS REPORT
Summary of Care

 Created on:2019



Patient:Joe Archuleta

Sex:Male

:1970

External Reference #:[id number]





Demographics







 Address  [address]



   [place] [address]

 

 Mobile Phone  [phone]

 

 Home Phone  [phone]

 

 Phone  [phone]

 

 Preferred Language  English

 

 Marital Status  

 

 Sabianist Affiliation  Unknown

 

 Race  White

 

 Ethnic Group   or 









Author







 Organization  [facility]

 

 Address  [address] [place] [address]









Support







 Name  Relationship  Address  Phone

 

 Mandie Blair  Unavailable  [address]  [phone]



     [place] [address]  









Care Team Providers







 Name  Role  Phone

 

 Veronica Huff MD  Primary Care Provider  [phone]









Encounter Details







 Date  Type  Department  Care Team  Description

 

 2019  Orders Only  [facility]



  Doctor Unassigned, No  



     [address] [facility]



  Name



  



     [place] [address]  [address] [id number]  







Allergies

No Known Allergiesdocumented as of this encounter (statuses as of 2019)



Medications

No known medicationsdocumented as of this encounter (statuses as of 2019)



Active Problems







 Problem  Noted Date

 

 Hyperglycemia  2019

 

 Alcoholic cirrhosis  2019

 

 Chronic hepatitis C without hepatic coma  2019

 

 Chronic abdominal pain  2019

 

 Anxiety  2019

 

 Diabetes mellitus type 2 with complications, uncontrolled  2019

 

 Hyperammonemia  2019

 

 Obesity (BMI 30-39.9)  2019

 

 Hepatic encephalopathy  2015

 

 S/P TIPS (transjugular intrahepatic portosystemic shunt)  2015

 

 History of CVA (cerebrovascular accident)  2015

 

 History of MI (myocardial infarction)  2015



documented as of this encounter (statuses as of 2019)



Resolved Problems







 Problem  Noted Date  Resolved Date

 

 History of alcoholism  2019

 

 Hospice care patient  2015



documented as of this encounter (statuses as of 2019)



Immunizations







 Name  Administration Dates  Next Due

 

 Influenza Virus Vaccine Quad IM 3+ YRS  2015  

 

 Pneumococcal Polysaccharide, PPSV23 (PNEUMOVAX)  2015  



documented as of this encounter



Social History







 Tobacco Use  Types  Packs/Day  Years Used  Date

 

 Never Smoker        









 Smokeless Tobacco: Never Used      









 Alcohol Use  Drinks/Week  oz/Week  Comments

 

 Not Currently      









 Education  Answer  Date Recorded

 

 What is the highest level of school you have completed or the  9th grade  



 highest degree you have received?    









 Financial Resource Strain  Answer  Date Recorded

 

 How hard is it for you to pay for the very basics like  Somewhat hard  2019



 food, housing, medical care, and heating?    









 Food Insecurity  Answer  Date Recorded

 

 Within the past 12 months, you worried that your food would  Never true  2019



 run out before you got money to buy more.    

 

 Within the past 12 months, the food you bought just didn't  Never true  2019



 last and you didn't have money to get more.    









 Transportation Needs  Answer  Date Recorded

 

 In the past 12 months, has lack of transportation kept you from  No  2019



 medical appointments or from getting medications?    

 

 In the past 12 months, has lack of transportation kept you from  No  2019



 meetings, work, or getting things needed for daily living?    









 Sex Assigned at Birth  Date Recorded

 

 Not on file  









 Job Start Date  Occupation  Industry

 

 Not on file  Not on file  Not on file









 Travel History  Travel Start  Travel End









 No recent travel history available.



documented as of this encounter



Last Filed Vital Signs

Not on filedocumented in this encounter



Plan of Treatment







 Date  Type  Specialty  Care Team  Description

 

 2019  Office Visit  Family Medicine  Veronica Huff MD



  



       [address] [place] [address] [place] [id number] [phone] (Fax)  









 Health Maintenance  Due Date  Last Done  Comments

 

 EYE EXAM  1981    

 

 LDL-C  1981    

 

 URINE MICROALBUMIN  1981    

 

 FOOT EXAM  1989    

 

 DTaP,Tdap,and Td Vaccines (1 -  1990    



 Tdap)      

 

 INFLUENZA VACCINE  2019  

 

 HgA1C  2020, 2019  

 

 CREATININE (SERUM)  2020, 2019,  



     2019, Additional history  



     exists  

 

 PNEUMOCOCCAL 0-64 YEARS COMBINED  Completed  2015  



 SERIES      



documented as of this encounter



Procedures







 Procedure Name  Priority  Date/Time  Associated Diagnosis  Comments

 

 AUTHORIZATION FOR RELEASE  Routine  2019 12:01 AM    



 OF PHI    CDT    



documented in this encounter



Results

Not on filedocumented in this encounter



Insurance







 Payer  Benefit Plan /  Subscriber ID  Effective Dates  Phone  Address  Type



   Group          

 

 [employer] [place]  xxxxxxxxx  2019-Present  [phone]  [address]  Medicaid [place]        [id number] [place] [address]  



           [address]  



documented as of this encounter

## 2019-09-02 NOTE — XMS REPORT
Summary of Care

 Created on:2019



Patient:Joe Archuleta

Sex:Male

:1970

External Reference #:[id number]





Demographics







 Address  [address]



   [place] [address]

 

 Mobile Phone  [phone]

 

 Home Phone  [phone]

 

 Phone  [phone]

 

 Preferred Language  English

 

 Marital Status  

 

 Yazdanism Affiliation  Unknown

 

 Race  White

 

 Ethnic Group   or 









Author







 Organization  [facility] [[email or web address]

 

 Address  [address] [place] [address]









Support







 Name  Relationship  Address  Phone

 

 Mandie Blair  Unavailable  [address]  [phone]



     [place] [address]  









Care Team Providers







 Name  Role  Phone

 

 Vreonica Huff MD  Primary Care Provider  [phone]









Reason for Visit







 Reason  Comments

 

 Referral/consult  







Encounter Details







 Date  Type  Department  Care Team  Description

 

 2019  Case Management  [facility]  Veronica Huff  Referral/
consult



     Medicine - Shereen MCKEON MD



  



     [facility] [address]



  [facility] [place], [place]  



     [address]



  [phone] (Fax)  







Allergies

No Known Allergiesdocumented as of this encounter (statuses as of 2019)



Medications







 Medication  Sig  Dispensed  Refills  Start Date  End Date  Status

 

 furosemide 40 mg  TAKE 1 TABLET BY    0  2019    Active



 tablet  MOUTH ONCE DAILY          

 

 spironolactone 100 mg  TAKE 1 TABLET BY    1  2019    Active



 tablet  MOUTH ONCE DAILY          

 

 Insulin Glargine  inject 15 Units  6 mL  2019    Active



 (LANTUS SOLOSTAR  under the skin at          



 U-100 INSULIN) 100  bedtime.          



 unit/mL (3 mL)            



 injectionIndications:            



 Diabetes mellitus            



 type 2 with            



 complications,            



 uncontrolled            

 

 Insulin Needles,  Use as directed  100 Each  2019    Active



 Disposable, (PEN  daily to inject          



 NEEDLE) 32 gauge x  insulin          



 "            



 NdleIndications:            



 Diabetes mellitus            



 type 2 with            



 complications,            



 uncontrolled            

 

 glimepiride 1 mg  Take 1 tablet by  30 tablet  2019    Active



 tabletIndications:  mouth daily with          



 Diabetes mellitus  breakfast.          



 type 2 with            



 complications,            



 uncontrolled            

 

 pantoprazole 40 mg EC  Take 1 tablet by    0  2019    Active



 tablet  mouth daily.          

 

 lactulose 10 gram/15  Take 30 mL by mouth  2700 mL  1  2019    Active



 mL  3 (three) times          



 solutionIndications:  daily as needed for          



 Hepatic  Constipation or          



 encephalopathy  Encephalophathy.          

 

 ENEMA BAG  Use as directed  1 Each  5  2019    Active



 MiscIndications:            



 Hepatic            



 encephalopathy,            



 Hyperammonemia            

 

 citalopram 20 mg  Take 1 tablet by  30 tablet  2  2019    Active



 tabletIndications:  mouth daily.          



 Anxiety            



documented as of this encounter (statuses as of 2019)



Active Problems







 Problem  Noted Date

 

 Alcoholic cirrhosis  2019

 

 Chronic hepatitis C without hepatic coma  2019

 

 Chronic abdominal pain  2019

 

 Anxiety  2019

 

 Diabetes mellitus type 2 with complications, uncontrolled  2019

 

 Hyperammonemia  2019

 

 Obesity (BMI 30-39.9)  2019

 

 Hepatic encephalopathy  2015

 

 S/P TIPS (transjugular intrahepatic portosystemic shunt)  2015

 

 History of CVA (cerebrovascular accident)  2015

 

 History of MI (myocardial infarction)  2015



documented as of this encounter (statuses as of 2019)



Resolved Problems







 Problem  Noted Date  Resolved Date

 

 History of alcoholism  2019

 

 Hospice care patient  2015



documented as of this encounter (statuses as of 2019)



Immunizations







 Name  Administration Dates  Next Due

 

 Influenza Virus Vaccine Quad IM 3+ YRS  2015  

 

 Pneumococcal Polysaccharide, PPSV23 (PNEUMOVAX)  2015  



documented as of this encounter



Social History







 Tobacco Use  Types  Packs/Day  Years Used  Date

 

 Never Smoker        









 Smokeless Tobacco: Never Used      









 Alcohol Use  Drinks/Week  oz/Week  Comments

 

 Not Currently      









 Education  Answer  Date Recorded

 

 What is the highest level of school you have completed or the  9th grade  



 highest degree you have received?    









 Financial Resource Strain  Answer  Date Recorded

 

 How hard is it for you to pay for the very basics like  Somewhat hard  2019



 food, housing, medical care, and heating?    









 Food Insecurity  Answer  Date Recorded

 

 Within the past 12 months, you worried that your food would  Never true  2019



 run out before you got money to buy more.    

 

 Within the past 12 months, the food you bought just didn't  Never true  2019



 last and you didn't have money to get more.    









 Transportation Needs  Answer  Date Recorded

 

 In the past 12 months, has lack of transportation kept you from  No  2019



 medical appointments or from getting medications?    

 

 In the past 12 months, has lack of transportation kept you from  No  2019



 meetings, work, or getting things needed for daily living?    









 Sex Assigned at Birth  Date Recorded

 

 Not on file  









 Job Start Date  Occupation  Industry

 

 Not on file  Not on file  Not on file









 Travel History  Travel Start  Travel End









 No recent travel history available.



documented as of this encounter



Last Filed Vital Signs

Not on filedocumented in this encounter



Progress Notes

Veronica Huff MD - 2019  3:17 PM CDTFace-To-Face Documentation of 
the Medical Necessity of Home Health Services



I [facility]  for:  New patient visit for further management of advanced liver disease
, uncontrolled diabetes, uncontrolled chronic pain and anxiety.



I certify, based on my findings, that the following services are medically 
necessary:

Skilled Nursing: Yes

Physical therapy: No

Occupational therapy:  No

Speech language pathology: No

Certified Home Health Aid:  Yes

Agency :  Yes



My clinical findings support the need for the above services as follows:  50yo 
M with debility due to multiple chronic health conditions including advanced 
liver disease due to alcoholic cirrhosis and chronic Hep C w/ hepatic 
encephalopathy, anxiety, chronic pain, and uncontrolled recent diagnosis of 
DM2.  Needs SN (VS monitoring, pain, mental health, and glucose assessment, 
chronic disease education, and help with alcohol cessation and medication 
management amber with compliance), a home health aide (help with ADLs), and a 
medical social worker (for help addressing lack of transportation, psychosocial 
issues, etc).  Patient has waxing and waning confusion due to encephalopathy 
caused by his liver disease.  It takes great and taxing effort for him to leave 
the home due to gait instability, generalized weakness and malaise, and 
cognitive impairment when his ammonia level is high.



I certify my clinical findings/ diagnoses support that this patient is 
homebound per CMS guidelines due to:  physical condition



I certify that this patient is under the care of the Critical access hospital and thatI had a face-to-face encounter that meets the 
physician's face-to-face requirements with this patient as noted above.



_____________________________

Veronica Huff MD

2019Electronically signed by Veronica Huff MD at 2019  3:27 PM 
CDTdocumented in this encounter



Plan of Treatment







 Date  Type  Specialty  Care Team  Description

 

 2019  Office Visit  Family Medicine  Veronica Huff MD



  



       [address] [place] [address] [place] [address] [phone] (Fax)  









 Health Maintenance  Due Date  Last Done  Comments

 

 EYE EXAM  1981    

 

 LDL-C  1981    

 

 URINE MICROALBUMIN  1981    

 

 FOOT EXAM  1989    

 

 DTaP,Tdap,and Td Vaccines ( -  1990    



 Tdap)      

 

 INFLUENZA VACCINE  2019  

 

 HgA1C  2020  

 

 CREATININE (SERUM)  2020, 2019,  



     2019, Additional history  



     exists  

 

 PNEUMOCOCCAL 0-64 YEARS COMBINED  Completed  2015  



 SERIES      



documented as of this encounter



Results

Not on filedocumented in this encounter



Visit Diagnoses







 Diagnosis

 

 Alcoholic cirrhosis, unspecified whether ascites present - Primary

 

 Anxiety







 Anxiety state, unspecified

 

 Chronic abdominal pain







 Abdominal pain, unspecified site

 

 Chronic hepatitis C without hepatic coma

 

 Diabetes mellitus type 2 with complications, uncontrolled







 Type II or unspecified type diabetes mellitus with unspecified complication,



 uncontrolled

 

 Hepatic encephalopathy

 

 Debility







 Debility, unspecified



documented in this encounter



Insurance







 Payer  Benefit Plan /  Subscriber ID  Effective Dates  Phone  Address  Type



   Group          

 

 [employer] [place] OF  xxxxxxxxx  2019-Present  [phone]  [address]  Medicaid



   [place]        [id number] [place] [address]  



           [id number]  



documented as of this encounter
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
## 2019-09-02 NOTE — XMS REPORT
Summary of Care

 Created on:2019



Patient:Joe Archuleta

Sex:Male

:1970

External Reference #:[id number]





Demographics







 Address  [address]



   [place] [address]

 

 Mobile Phone  [phone]

 

 Home Phone  [phone]

 

 Phone  [phone]

 

 Preferred Language  English

 

 Marital Status  

 

 Nondenominational Affiliation  Unknown

 

 Race  White

 

 Ethnic Group   or 









Author







 Organization  [facility]

 

 Address  [address] [place] [id number]









Support







 Name  Relationship  Address  Phone

 

 Mandie Blair  Unavailable  [address]  [phone]



     [place] [address]  









Care Team Providers







 Name  Role  Phone

 

 Veronica Huff MD  Primary Care Provider  [phone]









Reason for Visit







 Reason  Comments

 

 [facility]







Encounter Details







 Date  Type  Department  Care Team  Description

 

 2019  Patient Outreach  [facility]  Karen Jim,  [facility]



     [facility]



  ([facility])



     [address] [address]



  



     [address]



  [phone]    







Allergies

No Known Allergiesdocumented as of this encounter (statuses as of 2019)



Medications







 Medication  Sig  Dispensed  Refills  Start Date  End Date  Status

 

 furosemide 40 mg  TAKE 1 TABLET BY    0  2019    Active



 tablet  MOUTH ONCE DAILY          

 

 spironolactone 100 mg  TAKE 1 TABLET BY    1  2019    Active



 tablet  MOUTH ONCE DAILY          

 

 Insulin Glargine  inject 15 Units  6 mL  2019    Active



 (LANTUS SOLOSTAR  under the skin at          



 U-100 INSULIN) 100  bedtime.          



 unit/mL (3 mL)            



 injectionIndications:            



 Diabetes mellitus            



 type 2 with            



 complications,            



 uncontrolled            

 

 Insulin Needles,  Use as directed  100 Each  2019    Active



 Disposable, (PEN  daily to inject          



 NEEDLE) 32 gauge x  insulin          



 "            



 NdleIndications:            



 Diabetes mellitus            



 type 2 with            



 complications,            



 uncontrolled            

 

 glimepiride 1 mg  Take 1 tablet by  30 tablet  2019    Active



 tabletIndications:  mouth daily with          



 Diabetes mellitus  breakfast.          



 type 2 with            



 complications,            



 uncontrolled            

 

 pantoprazole 40 mg EC  Take 1 tablet by    0  2019    Active



 tablet  mouth daily.          

 

 lactulose 10 gram/15  Take 30 mL by mouth  2700 mL  1  2019    Active



 mL  3 (three) times          



 solutionIndications:  daily as needed for          



 Hepatic  Constipation or          



 encephalopathy  Encephalophathy.          

 

 ENEMA BAG  Use as directed  1 Each  5  2019    Active



 MiscIndications:            



 Hepatic            



 encephalopathy,            



 Hyperammonemia            

 

 citalopram 20 mg  Take 1 tablet by  30 tablet  2  2019    Active



 tabletIndications:  mouth daily.          



 Anxiety            



documented as of this encounter (statuses as of 2019)



Active Problems







 Problem  Noted Date

 

 Alcoholic cirrhosis  2019

 

 Chronic hepatitis C without hepatic coma  2019

 

 Chronic abdominal pain  2019

 

 Anxiety  2019

 

 Diabetes mellitus type 2 with complications, uncontrolled  2019

 

 Hyperammonemia  2019

 

 Obesity (BMI 30-39.9)  2019

 

 Hepatic encephalopathy  2015

 

 S/P TIPS (transjugular intrahepatic portosystemic shunt)  2015

 

 History of CVA (cerebrovascular accident)  2015

 

 History of MI (myocardial infarction)  2015



documented as of this encounter (statuses as of 2019)



Resolved Problems







 Problem  Noted Date  Resolved Date

 

 History of alcoholism  2019

 

 Hospice care patient  2015



documented as of this encounter (statuses as of 2019)



Immunizations







 Name  Administration Dates  Next Due

 

 Influenza Virus Vaccine Quad IM 3+ YRS  2015  

 

 Pneumococcal Polysaccharide, PPSV23 (PNEUMOVAX)  2015  



documented as of this encounter



Social History







 Tobacco Use  Types  Packs/Day  Years Used  Date

 

 Never Smoker        









 Smokeless Tobacco: Never Used      









 Alcohol Use  Drinks/Week  oz/Week  Comments

 

 Not Currently      









 Education  Answer  Date Recorded

 

 What is the highest level of school you have completed or the  9th grade  



 highest degree you have received?    









 Financial Resource Strain  Answer  Date Recorded

 

 How hard is it for you to pay for the very basics like  Somewhat hard  2019



 food, housing, medical care, and heating?    









 Food Insecurity  Answer  Date Recorded

 

 Within the past 12 months, you worried that your food would  Never true  2019



 run out before you got money to buy more.    

 

 Within the past 12 months, the food you bought just didn't  Never true  2019



 last and you didn't have money to get more.    









 Transportation Needs  Answer  Date Recorded

 

 In the past 12 months, has lack of transportation kept you from  No  2019



 medical appointments or from getting medications?    

 

 In the past 12 months, has lack of transportation kept you from  No  2019



 meetings, work, or getting things needed for daily living?    









 Sex Assigned at Birth  Date Recorded

 

 Not on file  









 Job Start Date  Occupation  Industry

 

 Not on file  Not on file  Not on file









 Travel History  Travel Start  Travel End









 No recent travel history available.



documented as of this encounter



Last Filed Vital Signs

Not on filedocumented in this encounter



Progress Notes

Karen Jim LMSW - 2019  3:55 PM CDTSocial Work Note



LMSW received a referral to get patient set up with home health as soon as 
possible.



LMSW reviewed patient chart and patient has traditional Medicaid.



LMSW was informed that patient is choosing to go with [facility] ([phone]
[phone] / fax [phone]).



Care Management Patient Choice Notification

Ambulatory Social Work



Patient's provider has recommended post-acute care, home health services, and/
or durable medical equipment.  Based on where Patient resides, and agency 
service areas, a list was generated from:



 Your insurance company's in-network provider list

 www.Medicare.gov



If post-acute services were recommended, Patient was informed of the following 
disclosure: [facility] is affiliated with the following facilities/agencies:

 [facility] (skilled nursing and rehabilitation)

On 2019, Patient chose the following agencies to provide services:



* [facility] ([phone] / fax [phone])



LMSW verified that [facility] takes patient's insurance and covers patient's 
area.



LMSW faxed over the referral to [facility] from the 19 office visit.



Please consult LMSW if additional needs arise.



Karen Jim LMSW

Social Work - Care Management

[phone] cell



Electronically signed by Karen Jim LMSW at 2019  4:03 PM 
CDTdocumented in this encounter



Plan of Treatment







 Date  Type  Specialty  Care Team  Description

 

 2019  Office Visit  Family Medicine  Veronica Huff MD



  



       [address] [place] [address] [place] [id number] [phone] (Fax)  









 Health Maintenance  Due Date  Last Done  Comments

 

 EYE EXAM  1981    

 

 LDL-C  1981    

 

 URINE MICROALBUMIN  1981    

 

 FOOT EXAM  1989    

 

 DTaP,Tdap,and Td Vaccines (1 -  1990    



 Tdap)      

 

 INFLUENZA VACCINE  2019  

 

 HgA1C  2020  

 

 CREATININE (SERUM)  2020, 2019,  



     2019, Additional history  



     exists  

 

 PNEUMOCOCCAL 0-64 YEARS COMBINED  Completed  2015  



 SERIES      



documented as of this encounter



Results

Not on filedocumented in this encounter



Insurance







 Payer  Benefit Plan /  Subscriber ID  Effective Dates  Phone  Address  Type



   Group          

 

 [employer] [place] [place]  xxxxxxxxx  2019-Present  [phone]  [address]  [place]icaid [place]        [id number] [place] [address]  



           [id number]  



documented as of this encounter
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
## 2019-09-02 NOTE — XMS REPORT
Summary of Care

 Created on:2019



Patient:Joe Archuleta

Sex:Male

:1970

External Reference #:[id number]





Demographics







 Address  [address] [address]

 

 Mobile Phone  [phone]

 

 Home Phone  [phone]

 

 Phone  [phone]

 

 Email Address  [email or web address]

 

 Preferred Language  English

 

 Marital Status  

 

 Catholic Affiliation  Unknown

 

 Race  White

 

 Ethnic Group   or 









Author







 Organization  [facility]

 

 Address  [address] [place] [address]









Support







 Name  Relationship  Address  Phone

 

 Mandie Blair  Unavailable  [address]  [phone]



     [place] [address]  









Care Team Providers







 Name  Role  Phone

 

 Pcp, Patient Does Not Have A  Primary Care Provider  [phone]









Encounter Details







 Date  Type  Department  Care Team  Description

 

 2019  Orders Only  [facility]



  Doctor Unassigned, No  



     [address] [facility]



  Name



  



     [place] [address]  [address] [place] [address]  







Allergies

No Known Allergiesdocumented as of this encounter (statuses as of 2019)



Medications







 Medication  Sig  Dispensed  Refills  Start Date  End Date  Status

 

 traMADOL (ULTRAM) 50  Take 1 Tab by  30 Tab  0  2015    Active



 mg tablet  mouth every 6          



   (six) hours as          



   needed for Pain          



   unrelieved by          



   non-narcotic          



   analgesics.          

 

 proMETHazine  Take 1 Tab by  20 Tab  0  2015    Active



 (PHENERGAN) 25 mg  mouth every 6          



 tablet  (six) hours as          



   needed for Nausea          



   and Vomiting          



   (N/V).          

 

 ALPRAZOLAM (XANAX  Take  by mouth.    0      Active



 ORAL)            

 

 MORPHINE SULFATE  Take  by mouth.    0      Active



 (MORPHINE ORAL)            

 

 OXYCODONE HCL  Take  by mouth.    0      Active



 (OXYCODONE ORAL)            

 

 Bisacodyl (CORRECTOL)  Take 1 Tab by  30 Tab  2  10/04/2015    Active



 5 mg Tab  mouth 2 (two)          



   times daily with          



   meals as needed          



   for Constipation.          

 

 esomeprazole (NEXIUM)  Take 1 capsule by  20 capsule  11  2019    Active



 40 mg  mouth daily with          



 capsuleIndications:  breakfast.          



 Fatigue, unspecified            



 type, Encounter for            



 medication management            

 

 metFORMIN 500 mg  Take 1 tablet by  30 tablet  0  2019    Active



 tabletIndications:  mouth daily.          



 Hepatic encephalopathy            

 

 furosemide 40 mg  Take 1 tablet by  30 tablet  0  2019    Active



 tabletIndications:  mouth daily.          



 Hepatic encephalopathy            

 

 spironolactone 100 mg  Take 1 tablet by  30 tablet  1  2019    Active



 tabletIndications:  mouth daily.          



 Hepatic encephalopathy            

 

 lactulose 10 gram/15  Take 30 mL by  1 Bottle  2  2019    Active



 mL  mouth 3 (three)          



 solutionIndications:  times daily. For          



 Fatigue, unspecified  2-3 bowel          



 type, Encounter for  movements daily          



 medication management            

 

 busPIRone 10 mg  Take 1 tablet by  60 tablet  0  2019    Active



 tabletIndications:  mouth 2 (two)          



 Hepatic encephalopathy  times daily.          

 

 pantoprazole 40 mg EC  Take 1 tablet by  30 tablet  0  2019    Active



 tabletIndications:  mouth daily.          



 Hepatic encephalopathy            

 

 lactulose 300 mL 10  Use 1 enema per  1 Bottle  2  2019    Active



 g/15 mL  rectum daily as          



 enemaIndications:  needed if no          



 Hepatic encephalopathy  response with          



   oral lactulose          

 

 ondansetron (ZOFRAN) 4  Take 1 tablet by  12 tablet  0  2019    Active



 mg tabletIndications:  mouth every 8          



 Uncontrolled type 2  (eight) hours as          



 diabetes mellitus with  needed for Nausea          



 hyperglycemia  and Vomiting          



   (N/V).          



documented as of this encounter (statuses as of 2019)



Active Problems







 Problem  Noted Date

 

 Hepatic encephalopathy  2015

 

 S/P TIPS (transjugular intrahepatic portosystemic shunt)  2015

 

 Hospice care patient  2015

 

 History of CVA (cerebrovascular accident)  2015

 

 History of MI (myocardial infarction)  2015



documented as of this encounter (statuses as of 2019)



Immunizations







 Name  Administration Dates  Next Due

 

 Influenza Virus Vaccine Quad IM 3+ YRS  2015  

 

 Pneumococcal Polysaccharide, PPSV23 (PNEUMOVAX)  2015  



documented as of this encounter



Social History







 Tobacco Use  Types  Packs/Day  Years Used  Date

 

 Never Smoker        









 Smokeless Tobacco: Never Used      









 Education  Answer  Date Recorded

 

 What is the highest level of school you have completed or the  9th grade  



 highest degree you have received?    









 Financial Resource Strain  Answer  Date Recorded

 

 How hard is it for you to pay for the very basics like  Somewhat hard  2019



 food, housing, medical care, and heating?    









 Food Insecurity  Answer  Date Recorded

 

 Within the past 12 months, you worried that your food would  Never true  2019



 run out before you got money to buy more.    

 

 Within the past 12 months, the food you bought just didn't  Never true  2019



 last and you didn't have money to get more.    









 Transportation Needs  Answer  Date Recorded

 

 In the past 12 months, has lack of transportation kept you from  No  2019



 medical appointments or from getting medications?    

 

 In the past 12 months, has lack of transportation kept you from  No  2019



 meetings, work, or getting things needed for daily living?    









 Sex Assigned at Birth  Date Recorded

 

 Not on file  









 Job Start Date  Occupation  Industry

 

 Not on file  Not on file  Not on file









 Travel History  Travel Start  Travel End









 No recent travel history available.



documented as of this encounter



Last Filed Vital Signs

Not on filedocumented in this encounter



Plan of Treatment







 Date  Type  Specialty  Care Team  Description

 

 2019  Office Visit  Family Medicine  Veronica Huff MD



  



       [address] [place] [address] [place] [id number] [phone] (Fax)  









 Health Maintenance  Due Date  Last Done  Comments

 

 DTaP,Tdap,and Td Vaccines (1 - Tdap)  1990    

 

 INFLUENZA VACCINE  2019  

 

 PNEUMOCOCCAL 0-64 YEARS COMBINED SERIES  Completed  2015  



documented as of this encounter



Procedures







 Procedure Name  Priority  Date/Time  Associated Diagnosis  Comments

 

 CONSENT/REFUSAL FOR  Routine  2019 10:34 AM CDT    



 DIAGNOSIS AND TREATMENT        



documented in this encounter



Results

Not on filedocumented in this encounter



Insurance







 Payer  Benefit Plan /  Subscriber ID  Effective Dates  Phone  Address  Type



   Group          

 

 [employer] [place]  xxxxxxxxx  2019-Present  [phone]  [address]  [place]icaid [place]        [id number] [place] [address]  



           [id number]  



documented as of this encounter

## 2019-09-02 NOTE — XMS REPORT
Summary of Care

 Created on:2019



Patient:Joe Archuleta

Sex:Male

:1970

External Reference #:[id number]





Demographics







 Address  [address]



   [place] [address]

 

 Mobile Phone  [phone]

 

 Home Phone  [phone]

 

 Phone  [phone]

 

 Preferred Language  English

 

 Marital Status  

 

 Catholic Affiliation  Unknown

 

 Race  White

 

 Ethnic Group   or 









Author







 Organization  [facility]

 

 Address  [address] [place] [address]









Support







 Name  Relationship  Address  Phone

 

 Mandie Blair  Unavailable  [address]  [phone]



     [place] [address]  









Care Team Providers







 Name  Role  Phone

 

 Veronica Huff MD  Primary Care Provider  [phone]









Reason for Referral

Radiology Services (STAT)





 Status  Reason  Specialty  Diagnoses /  Referred By  Referred To



       Procedures  Contact  Contact

 

 New Request    Diagnostic  Diagnoses



Chest wall pain  Ibikunle,  



     Radiology  



Procedures



Chest 2 Views  Folusho F, FNP



  



         [address]  



         [address]



  



         Phone:  



         [phone]



  



         Fax:  



         [phone]  





Radiology Services (STAT)





 Status  Reason  Specialty  Diagnoses /  Referred By  Referred To



       Procedures  Contact  Contact

 

 New Request    Diagnostic  Diagnoses



Chest wall pain  Ibikunle,  



     Radiology  



Procedures



Chest 2 Views  Folusho F, FNP



  



         [address] [facility]



  



         [address] [place] [address]  



         [address]



  



         Phone:  



         [phone]



  



         Fax:  



         [phone]  









Reason for Visit







 Reason  Comments

 

 Chest wall pain  



Auth/Cert





 Status  Reason  Specialty  Diagnoses /  Referred By  Referred To



       Procedures  Contact  Contact

 

     Emergency Medicine  Diagnoses



CHEST WALL PAIN    [facility]



           Dept







           [address] [place] [address]



           







           [place]



           [address]







           Phone:



           [phone]







           Fax: [phone]









Encounter Details







 Date  Type  Department  Care Team  Description

 

 2019 -  Emergency  [facility]  Ibikunle, Folusho F, FNP



[address] [facility]



[address] [place], [place] [id number] [phone] (Fax)  Hyperglycemia



 2019    Unit



  



Pierre Morse MD



[address] [place] [address] [phone] (Fax)  



     [address] [place] [address] 



    



     [place] [address]



    



     [phone]    







Allergies

No Known Allergiesdocumented as of this encounter (statuses as of 2019)



Medications







 Medication  Sig  Dispensed  Refills  Start Date  End Date  Status

 

 furosemide 40 mg  TAKE 1 TABLET BY    0  2019  Discontinued



 tablet  MOUTH ONCE DAILY        19  

 

 spironolactone 100  TAKE 1 TABLET BY    1  2019  Discontinued



 mg tablet  MOUTH ONCE DAILY        19  

 

 Insulin Glargine  inject 15 Units  6 mL  5  2019  Discontinued



 (LANTUS SOLOSTAR  under the skin at        19  



 U-100 INSULIN) 100  bedtime.          



 unit/mL (3 mL)            



 injectionIndication            



 s: Diabetes            



 mellitus type 2            



 with complications,            



 uncontrolled            

 

 Insulin Needles,  Use as directed  100 Each  5  2019  
Discontinued



 Disposable, (PEN  daily to inject        19  



 NEEDLE) 32 gauge x  insulin          



 "            



 NdleIndications:            



 Diabetes mellitus            



 type 2 with            



 complications,            



 uncontrolled            

 

 glimepiride 1 mg  Take 1 tablet by  30 tablet  5  2019  
Discontinued



 tabletIndications:  mouth daily with        19  



 Diabetes mellitus  breakfast.          



 type 2 with            



 complications,            



 uncontrolled            

 

 pantoprazole 40 mg  Take 1 tablet by    0  2019  Discontinued



 EC tablet  mouth daily.        19  

 

 lactulose 10  Take 30 mL by  2700 mL  1  2019  Discontinued



 gram/15 mL  mouth 3 (three)        19  



 solutionIndications  times daily as          



 : Hepatic  needed for          



 encephalopathy  Constipation or          



   Encephalophathy.          

 

 ENEMA BAG  Use as directed  1 Each  5  2019  Discontinued



 MiscIndications:          19  



 Hepatic            



 encephalopathy,            



 Hyperammonemia            

 

 citalopram 20 mg  Take 1 tablet by  30 tablet  2  2019  
Discontinued



 tabletIndications:  mouth daily.        19  



 Anxiety            

 

 citalopram 20 mg  Take 20 mg by    0    20  Discontinued



 tablet  mouth daily.        19  

 

 glimepiride 1 mg  Take 1 mg by    0    20  Discontinued



 tablet  mouth daily with        19  



   breakfast.          



documented as of this encounter (statuses as of 2019)



Active Problems







 Problem  Noted Date

 

 Hyperglycemia  2019

 

 Alcoholic cirrhosis  2019

 

 Chronic hepatitis C without hepatic coma  2019

 

 Chronic abdominal pain  2019

 

 Anxiety  2019

 

 Diabetes mellitus type 2 with complications, uncontrolled  2019

 

 Hyperammonemia  2019

 

 Obesity (BMI 30-39.9)  2019

 

 Hepatic encephalopathy  2015

 

 S/P TIPS (transjugular intrahepatic portosystemic shunt)  2015

 

 History of CVA (cerebrovascular accident)  2015

 

 History of MI (myocardial infarction)  2015



documented as of this encounter (statuses as of 2019)



Resolved Problems







 Problem  Noted Date  Resolved Date

 

 History of alcoholism  2019

 

 Hospice care patient  2015



documented as of this encounter (statuses as of 2019)



Immunizations







 Name  Administration Dates  Next Due

 

 Influenza Virus Vaccine Quad IM 3+ YRS  2015  

 

 Pneumococcal Polysaccharide, PPSV23 (PNEUMOVAX)  2015  



documented as of this encounter



Social History







 Tobacco Use  Types  Packs/Day  Years Used  Date

 

 Never Smoker        









 Smokeless Tobacco: Never Used      









 Tobacco Cessation: Counseling Given: No









 Alcohol Use  Drinks/Week  oz/Week  Comments

 

 Not Currently      









 Education  Answer  Date Recorded

 

 What is the highest level of school you have completed or the  9th grade  



 highest degree you have received?    









 Financial Resource Strain  Answer  Date Recorded

 

 How hard is it for you to pay for the very basics like  Somewhat hard  2019



 food, housing, medical care, and heating?    









 Food Insecurity  Answer  Date Recorded

 

 Within the past 12 months, you worried that your food would  Never true  2019



 run out before you got money to buy more.    

 

 Within the past 12 months, the food you bought just didn't  Never true  2019



 last and you didn't have money to get more.    









 Transportation Needs  Answer  Date Recorded

 

 In the past 12 months, has lack of transportation kept you from  No  2019



 medical appointments or from getting medications?    

 

 In the past 12 months, has lack of transportation kept you from  No  2019



 meetings, work, or getting things needed for daily living?    









 Sex Assigned at Birth  Date Recorded

 

 Not on file  









 Job Start Date  Occupation  Industry

 

 Not on file  Not on file  Not on file









 Travel History  Travel Start  Travel End









 No recent travel history available.



documented as of this encounter



Last Filed Vital Signs







 Vital Sign  Reading  Time Taken  Comments

 

 Blood Pressure  132/78  2019  8:00 AM CDT  

 

 Pulse  66  2019  8:00 AM CDT  

 

 Temperature  36.4 C (97.6 F)  2019  8:00 AM CDT  

 

 Respiratory Rate  18  2019  8:00 AM CDT  

 

 Oxygen Saturation  94%  2019  8:00 AM CDT  

 

 Inhaled Oxygen Concentration  -  -  

 

 Weight  104.3 kg (230 lb)  2019 10:09 PM CDT  

 

 Height  185.4 cm (6' 1")  2019 10:09 PM CDT  

 

 Body Mass Index  30.34  2019 10:09 PM CDT  



documented in this encounter



Plan of Treatment







 Date  Type  Specialty  Care Team  Description

 

 2019  Office Visit  Family Medicine  Veronica Huff MD



  



       [address] [place] [address] [place] [id number] [phone] (Fax)  









 Health Maintenance  Due Date  Last Done  Comments

 

 EYE EXAM  1981    

 

 LDL-C  1981    

 

 URINE MICROALBUMIN  1981    

 

 FOOT EXAM  1989    

 

 DTaP,Tdap,and Td Vaccines ( -  1990    



 Tdap)      

 

 INFLUENZA VACCINE  2019  

 

 HgA1C  2020  

 

 CREATININE (SERUM)  2020, 2019,  



     2019, Additional history  



     exists  

 

 PNEUMOCOCCAL 0-64 YEARS COMBINED  Completed  2015  



 SERIES      



documented as of this encounter



Procedures







 Procedure Name  Priority  Date/Time  Associated  Comments



       Diagnosis  

 

 POCT GLUCOSE  Routine  2019  7:38    Results for this



 (AUTOMATED)    AM CDT    procedure are in



         the results



         section.

 

 CBC WITH DIFFERENTIAL  Routine  2019  4:00    Results for this



     AM CDT    procedure are in



         the results



         section.

 

 POCT GLUCOSE  Routine  2019  4:00    Results for this



 (AUTOMATED)    AM CDT    procedure are in



         the results



         section.

 

 GLYCOSYLATED  Add-on  2019  4:00    Results for this



 HEMOGLOBIN (A1C)    AM CDT    procedure are in



         the results



         section.

 

 CBC WITH DIFF  Routine  2019  4:00    Results for this



     AM CDT    procedure are in



         the results



         section.

 

 BASIC METABOLIC PANEL  Routine  2019  4:00    Results for this



 (NA, K, CL, CO2,    AM CDT    procedure are in



 GLUCOSE, BUN,        the results



 CREATININE, CA)        section.

 

 POCT GLUCOSE  Routine  2019 11:33    Results for this



 (AUTOMATED)    PM CDT    procedure are in



         the results



         section.

 

 POCT GLUCOSE  Routine  2019 10:21    Results for this



 (AUTOMATED)    PM CDT    procedure are in



         the results



         section.

 

 CBC WITH DIFFERENTIAL  STAT  2019  8:07  Chest wall pain  Results for 
this



     PM CDT    procedure are in



         the results



         section.

 

 CBC WITH DIFF  Routine  2019  8:07  Chest wall pain  Results for this



     PM CDT    procedure are in



         the results



         section.

 

 BASIC METABOLIC PANEL  STAT  2019  8:07  Chest wall pain  Results for 
this



 (NA, K, CL, CO2,    PM CDT    procedure are in



 GLUCOSE, BUN,        the results



 CREATININE, CA)        section.

 

 HEPATIC FUNCTION  STAT  2019  8:07  Chest wall pain  Results for this



 PANEL (98490)    PM CDT    procedure are in



 (ALB,T.PRO,BILI        the results



 T,BU/BC,ALT,AST,ALK        section.



 PHOS)        

 

 TROPONIN I  STAT  2019  8:07  Chest wall pain  Results for this



     PM CDT    procedure are in



         the results



         section.

 

 XR CHEST 2 VW  STAT  2019  7:52  Chest wall pain  Results for this



     PM CDT    procedure are in



         the results



         section.

 

 NOTICE OF PRIVACY  Routine  2019  6:34    



 PRACTICES    PM CDT    



documented in this encounter



Results

POCT GLUCOSE (AUTOMATED) (2019  7:38 AM CDT)





 Component  Value  Ref Range  Performed At  Pathologist Signature

 

 POCT GLU  314 (H)  70 - 110 mg/dL  [facility]  



       LABORATORY  









 Specimen

 

 Blood









 Performing Organization  Address  City/State/Zipcode  Phone Number

 

 [facility]  CLIA: [id number], [address]  [place] [address]  



 LABORATORY  [address]    



GLYCOSYLATED HEMOGLOBIN (A1C) (2019  4:00 AM CDT)





 Component  Value  Ref Range  Performed At  Pathologist Signature

 

 HGB A1C  8.2 (H)  4.0 - 6.0 % NGSP  [facility]  



       LABORATORY  









 Specimen

 

 Blood - ARM, LEFT









 Narrative  Performed At

 

 %A1C (NGSP) Interpretation (ADA)



  [facility] LABORATORY



 4.8-5.6 Normal or (Non-Diabetic Range)



  



 5.7-6.4 Increased Risk (Pre-Diabetic)



  



 >6.5Diabetes Indicated  









 Performing Organization  Address  City/State/Zipcode  Phone Number

 

 [facility]  CLIA: [id number], [address]  [place] [address]  



 LABORATORY  [address]    



POCT GLUCOSE (AUTOMATED) (2019  4:00 AM CDT)





 Component  Value  Ref Range  Performed At  Pathologist Signature

 

 POCT GLU  395 (H)  70 - 110 mg/dL  [facility]  



       LABORATORY  









 Specimen

 

 Blood









 Performing Organization  Address  [facility]  Phone Number

 

 [facility]  CLIA: [id number], [address]  [place] [address]  



 LABORATORY  [address]    



CBC WITH DIFFERENTIAL (2019  4:00 AM CDT)





 Component  Value  Ref Range  Performed At  Pathologist Signature

 

 WBC  2.74 (L)  4.20 - 10.70  [facility]  



     10*3/L  [facility] LABORATORY  

 

 RBC  4.34  4.26 - 5.52  [facility]  



     10*6/L  [facility] LABORATORY  

 

 HGB  13.3  12.2 - 16.4  [facility]  



     g/dL  [facility] LABORATORY  

 

 HCT  37.9 (L)  38.4 - 49.3 %  [facility] LABORATORY  

 

 MCV  87.3  81.7 - 95.6 fL  [facility] LABORATORY  

 

 MCH  30.6  26.1 - 32.7 pg  [facility] LABORATORY  

 

 MCHC  35.1 (H)  31.2 - 35.0  [facility]  



     g/dL  [facility] LABORATORY  

 

 RDW-SD  42.9  38.5 - 51.6 fL  [facility] LABORATORY  

 

 RDW-CV  13.6  12.1 - 15.4 %  [facility] LABORATORY  

 

 PLT  83 (L)  150 - 328  [facility]  



     10*3/L  [facility] LABORATORY  

 

 MPV  12.2  9.8 - 13.0 fL  [facility] LABORATORY  

 

 NRBC/100 WBC  0.0  0.0 - 10.0 /100  [facility]  



     WBCs  [facility] LABORATORY  

 

 NRBC x10^3  <0.01  10*3/L  [facility] LABORATORY  

 

 GRAN MAT (NEUT) %  78.8  %  [facility] LABORATORY  

 

 IMM GRAN %  0.70  %  [facility] LABORATORY  

 

 LYMPH %  16.8  %  [facility] LABORATORY  

 

 MONO %  2.9  %  [facility] LABORATORY  

 

 EOS %  0.4  %  [facility] LABORATORY  

 

 BASO %  0.4  %  [facility] LABORATORY  

 

 GRAN MAT x10^3(ANC)  2.16  1.99 - 6.95  [facility]  



     10*3/uL  [facility] LABORATORY  

 

 IMM GRAN x10^3  <0.03  0.00 - 0.06  [address]/uL  [facility] LABORATORY  

 

 LYMPH x10^3  0.46 (L)  1.09 - 3.23  [address]/uL  [facility] LABORATORY  

 

 MONO x10^3  0.08 (L)  0.36 - 1.02  [place] [address]*3/uL  [facility] LABORATORY  

 

 EOS x10^3  <0.03 (L)  0.06 - 0.53  [address]/[facility] LABORATORY  

 

 BASO x10^3  <0.03  0.01 - 0.09  [address] LABORATORY  









 Specimen

 

 Blood - ARM, LEFT









 Performing Organization  Address  City/State/Zipcode  Phone Number

 

 [facility]  CLIA: [id number], [address]  [place] [address]  



 LABORATORY  [address]    



Basic Metabolic Panel (NA, K, CL, CO2, GLUCOSE, BUN, CREATININE, CA) (2019  4:00 AM CDT)





 Component  Value  Ref Range  Performed At  Pathologist



         Signature

 

 NA  140  135 - 145  [facility]  



     mmol/L  [facility] LABORATORY  

 

 K  4.7  3.5 - 5.0  [facility]  



     mmol/L  [facility] LABORATORY  

 

 CL  112 (H)  98 - 108 mmol/L  [facility] LABORATORY  

 

 CO2 TOTAL  20 (L)  23 - 31 mmol/L  [facility] LABORATORY  

 

 AGAP  8  2 - 16  [facility] LABORATORY  

 

 BUN  16  7 - 23 mg/dL  [facility] LABORATORY  

 

 GLUCOSE  417 (H)  70 - 110 mg/dL  [facility] LABORATORY  

 

 CREATININE  0.55 (L)  0.60 - 1.25  [facility]  



     mg/dL  [facility] LABORATORY  

 

 CALCIUM  9.0  8.6 - 10.6  [facility]  



     mg/dL  [facility] LABORATORY  

 

 eGFR Calculation  159.0  mL/min/1.73m2  [facility]  



 (Non-[facility] LABORATORY  



 American)        

 

 eGFR Calculation  192.7  mL/min/1.73m2  [facility]  



 ()      [facility] LABORATORY  









 Specimen

 

 Blood - ARM, LEFT









 Narrative  Performed At

 

 Association of Glomerular Filtration Rate (GFR)  [facility] 
LABORATORY



 and Staging of Kidney Disease*



  



 +-----------------------+---------------------+-  



 ------------------------+



  



 | GFR (mL/min/1.73 m2)| With Kidney  



 Damage|Without Kidney Damage



  



 +-----------------------+---------------------+-  



 ------------------------+



  



 |>90|  



 Stage one|  



 Normal



  



 +-----------------------+---------------------+-  



 ------------------------+



  



 |60-89|S  



 tage two| Decreased  



 GFR 



  



 +-----------------------+---------------------+-  



 ------------------------+



  



 |30-59|S  



 tage three| Stage  



 three 



  



 +-----------------------+---------------------+-  



 ------------------------+



  



 |15-29|S  



 tage four | Stage  



 four



  



 +-----------------------+---------------------+-  



 ------------------------+



  



 |<15 (or dialysis)|Stage  



 five | Stage  



 five



  



 +-----------------------+---------------------+-  



 ------------------------+



  



 *Each stage assumes the associated GFR level has  



 been in effect for at least three  



 months.Stages 1 to 5, with or without kidney  



 disease, indicate chronic kidney disease.



  



 Notes: Determination of stages one and two (with  



 eGFR >59mL/min/1.73 m2) requires estimation of  



 kidney damage for at least three months as  



 defined by structural or functional  



 abnormalities of the kidney, manifested by  



 either:



  



 Pathological abnormalities or Markers of kidney  



 damage (including abnormalities in the  



 composition of the blood or urine or  



 abnormalities in imaging tests).  









 Performing Organization  Address  [facility]  Phone Number

 

 [facility]  CLIA: [id number], [address] [place] [address]  



 LABORATORY  [address]    



POCT GLUCOSE (AUTOMATED) (2019 11:33 PM CDT)





 Component  Value  Ref Range  Performed At  Pathologist Signature

 

 POCT GLU  451 (HH)  70 - 110 mg/dL  [facility]  



       LABORATORY  









 Specimen

 

 Blood









 Performing Organization  Address  City/State/[facility]  Phone Number

 

 [id number] [address] [place] [address]  



 LABORATORY  [address]    



POCT GLUCOSE (AUTOMATED) (2019 10:21 PM CDT)





 Component  Value  Ref Range  Performed At  Pathologist Signature

 

 POCT GLU  468 (HH)  70 - 110 mg/dL  [facility]  



       LABORATORY  









 Specimen

 

 Blood









 Performing Organization  Address  [facility]  Phone Number

 

 [facility]  CLIA: [id number], [address] [place] [address]  



 LABORATORY  [address]    



CBC WITH DIFFERENTIAL (2019  8:07 PM CDT)





 Component  Value  Ref Range  Performed At  Pathologist Signature

 

 WBC  2.80 (L)  4.20 - 10.70  [facility]  



     10*3/L  [facility] LABORATORY  

 

 RBC  4.23 (L)  4.26 - 5.52  [facility]  



     10*6/L  [facility] LABORATORY  

 

 HGB  12.9  12.2 - 16.4  [facility]  



     g/dL  HOSPITAL LABORATORY  

 

 HCT  37.7 (L)  38.4 - 49.3 %  [facility] LABORATORY  

 

 MCV  89.1  81.7 - 95.6 fL  [facility] LABORATORY  

 

 MCH  30.5  26.1 - 32.7 pg  [facility] LABORATORY  

 

 MCHC  34.2  31.2 - 35.0  [facility]  



     g/dL  HOSPITAL LABORATORY  

 

 RDW-SD  44.0  38.5 - 51.6 fL  [facility] LABORATORY  

 

 RDW-CV  13.6  12.1 - 15.4 %  [facility] LABORATORY  

 

 PLT  75 (L)  150 - 328  [facility]  



     10*3/L  HOSPITAL LABORATORY  

 

 MPV  13.2 (H)  9.8 - 13.0 fL  [facility] LABORATORY  

 

 NRBC/100 WBC  0.0  0.0 - 10.0 /100  [facility]  



     WBCs  [facility] LABORATORY  

 

 NRBC x10^3  <0.01  10*3/L  [facility] LABORATORY  

 

 GRAN MAT (NEUT) %  56.0  %  [facility] LABORATORY  

 

 IMM GRAN %  0.40  %  [facility] LABORATORY  

 

 LYMPH %  25.7  %  [facility] LABORATORY  

 

 MONO %  13.9  %  [facility] LABORATORY  

 

 EOS %  2.9  %  [facility] LABORATORY  

 

 BASO %  1.1  %  [facility] LABORATORY  

 

 GRAN MAT x10^3(ANC)  1.57 (L)  1.99 - 6.95  [facility]  



     10*3/uL  [facility] LABORATORY  

 

 IMM GRAN x10^3  <0.03  0.00 - 0.06  [facility]  



     10*3/uL  HOSPITAL LABORATORY  

 

 LYMPH x10^3  0.72 (L)  1.09 - 3.23  [facility]  



     10*3/uL  HOSPITAL LABORATORY  

 

 MONO x10^3  0.39  0.36 - 1.02  [facility]  



     10*3/uL  HOSPITAL LABORATORY  

 

 EOS x10^3  0.08  0.06 - 0.53  [facility]  



     10*3/uL  HOSPITAL LABORATORY  

 

 BASO x10^3  0.03  0.01 - 0.09  [facility]  



     10*3/uL  [facility] LABORATORY  









 Specimen

 

 Blood - VENOUS









 Performing Organization  Address  City/State/Zipcode  Phone Number

 

 [facility]  CLIA: [id number], [address]  [place] [address]  



 LABORATORY  [address]    



TROPONIN I (2019  8:07 PM CDT)





 Component  Value  Ref Range  Performed At  Pathologist Signature

 

 TROPONIN I  0.011  <=0.034 ng/mL  [facility]  



       LABORATORY  









 Specimen

 

 Blood - VENOUS









 Narrative  Performed At

 

 Equal or Less than 0.034 ng/ml---Normal



  [facility] LABORATORY



 Note: Cardiac troponin begins to rise 3-4 hours  



 after the onset of ischemia. Repeat in 4-6 hours  



 if the sample was drawn within 3-4 hours of the  



 onset of the symptom and found normal. 



  



  



  



 Between 0.035 and 0.120 ng/mL--- Borderline.  



 Questionable myocardial injury or  



 necrosis



  



 Note: Serial measurement may be necessary to  



 confirm or exclude the diagnosis of myocardial  



 injury or necrosis; Clinical correlation  



 (symptoms, EKGs, imaging studies, and others)  



 required; Repeat in 4-6 hours if clinically  



 indicated.



  



  



  



 Equal or Higher than 0.121 ng/mL---Abnormal.  



 Myocardial Injury or Necrosis  



 Likely 



  



 Biotin has been reported to cause a negative  



 bias, interpret results relative to patient's  



 use of  



 biotin.  



  



  



   



   



   









 Performing Organization  Address  City[facility]  Phone Number

 

 [facility]  CLIA: [id number], [address] [place] [address]  



 LABORATORY  [address]    



Hepatic Function Panel (ALB, T.PRO, BILI T, BU/BC, ALT, AST, ALK PHOS) (2019  8:07 PM CDT)





 Component  Value  Ref Range  Performed At  Pathologist [facility]

 

 TOTAL BILI  2.4 (H)  0.1 - 1.1 mg/dL  [facility]  



       LABORATORY  

 

 BILI UNCON  2.1 (H)  0.1 - 1.1 mg/dL  [facility]  



       LABORATORY  

 

 BILI CONJ  0.0  0.0 - 0.3 mg/dL  [facility]  



       LABORATORY  

 

 T PROTEIN  7.4  6.3 - 8.2 g/dL  [facility]  



       LABORATORY  

 

 ALBUMIN  3.6  3.5 - 5.0 g/dL  [facility]  



       LABORATORY  

 

 ALK PHOS  144 (H)  34 - 122 U/L  [facility]  



       LABORATORY  

 

 ALT(SGPT)  71 (H)  9 - 51 U/L  [facility]  



       LABORATORY  

 

 AST(SGOT)  112 (H)  13 - 40 U/L  [facility]  



       LABORATORY  









 Specimen

 

 Blood - VENOUS









 Performing Organization  Address  [facility]  Phone Number

 

 [facility]  CLIA: [id number], [address]  [place] [address]  



 LABORATORY  [address]    



Basic Metabolic Panel (NA, K, CL, CO2, GLUCOSE, BUN, CREATININE, CA) (2019  8:07 PM CDT)





 Component  Value  Ref Range  Performed At  Pathologist



         Signature

 

 NA  134 (L)  135 - 145  [facility]  



     mmol/L  [facility]  

 

 K  4.8  3.5 - 5.0  [facility]  



     mmol/L  [facility]  

 

 CL  103  98 - 108 mmol/L  [facility]  

 

 CO2 TOTAL  20 (L)  23 - 31 mmol/L  [facility]  

 

 AGAP  11  2 - 16  [facility]  

 

 BUN  15  7 - 23 mg/dL  [facility]  

 

 GLUCOSE  711 (HH)  70 - 110 mg/dL  [facility]  

 

 CREATININE  0.61  0.60 - 1.25  [facility]  



     mg/dL  [facility]  

 

 CALCIUM  8.8  8.6 - 10.6  [facility]  



     mg/dL  [facility]  

 

 eGFR Calculation  141.1  mL/min/1.73m2  [facility]  



 ([facility]  



 [employer])        

 

 eGFR Calculation  171.0  mL/min/1.73m2  [facility]  



 ()      [facility]  









 Specimen

 

 Blood - VENOUS









 Narrative  Performed At

 

 [employer] of Glomerular Filtration Rate (GFR)  [facility] 
LABORATORY



 and Staging of Kidney Disease*



  



 +-----------------------+---------------------+-  



 ------------------------+



  



 | GFR (mL/min/1.73 m2)| With Kidney  



 Damage|Without Kidney Damage



  



 +-----------------------+---------------------+-  



 ------------------------+



  



 |>90|  



 Stage one|  



 Normal



  



 +-----------------------+---------------------+-  



 ------------------------+



  



 |60-89|S  



 tage two| Decreased  



 GFR 



  



 +-----------------------+---------------------+-  



 ------------------------+



  



 |30-59|S  



 tage three| Stage  



 three 



  



 +-----------------------+---------------------+-  



 ------------------------+



  



 |15-29|S  



 tage [age] | Stage  



 four



  



 +-----------------------+---------------------+-  



 ------------------------+



  



 |<15 (or dialysis)|Stage  



 five | Stage  



 five



  



 +-----------------------+---------------------+-  



 ------------------------+



  



 *Each stage assumes the associated GFR level has  



 been in effect for at least three  



 months.Stages 1 to 5, with or without kidney  



 disease, indicate chronic kidney disease.



  



 Notes: Determination of stages one and two (with  



 eGFR >59mL/min/1.73 m2) requires estimation of  



 kidney damage for at least three months as  



 defined by structural or functional  



 abnormalities of the kidney, manifested by  



 either:



  



 Pathological abnormalities or Markers of kidney  



 damage (including abnormalities in the  



 composition of the blood or urine or  



 abnormalities in imaging tests).  









 Performing Organization  Address  City/State/Zipcode  Phone Number

 

 [facility]  CLIA: [id number], [id number]  [place] [address]  



 [facility]    



Chest 2 Views (2019  7:52 PM CDT)





 Specimen

 

 









 Narrative  Performed At

 

 HISTORY:Chest wall pain.



  PACS/VR/DOSE



  



  



 TECHNIQUE: PA and lateral views of the chest are obtained. Comparison  



 made



  



 with portable study of 2019.



  



  



  



 FINDINGS: No acute pneumonia detected. No pneumothorax or pleural  



 effusion



  



 or pulmonary congestion. Cardiothoracic ratio of approximately 14.5/32.6  



 cm



  



 is consistent with normal cardiac size.



  



  



  



 Dilated central pulmonary arteries noted. Note also made of multiple old



  



 fractures of right ribs, tips shunt in the liver and multiple coils



  



 utilized for embolization of esophageal/gastric varices. No compression



  



 fracture detected in the thoracic vertebral bodies.



  



  



  



 CONCLUSIONS: No signs of acute cardiopulmonary disease.



  



   









 Procedure Note

 

 [facility], Radiant Results Inft User - 2019  7:57 PM CDT



HISTORY:  Chest wall pain.



 



 TECHNIQUE: PA and lateral views of the chest are obtained. Comparison made



 with portable study of 2019.



 



 FINDINGS: No acute pneumonia detected. No pneumothorax or pleural effusion



 or pulmonary congestion. Cardiothoracic ratio of approximately 14.5/32.6 cm



 is consistent with normal cardiac size.



 



 Dilated central pulmonary arteries noted. Note also made of multiple old



 fractures of right ribs, tips shunt in the liver and multiple coils



 utilized for embolization of esophageal/gastric varices. No compression



 fracture detected in the thoracic vertebral bodies.



 



 CONCLUSIONS: No signs of acute cardiopulmonary disease.









 Performing Organization  Address  City/State/Zipcode  Phone Number

 

 PACS/VR/DOSE      



documented in this encounter



Visit Diagnoses







 Diagnosis

 

 Chest wall pain - Primary







 Painful respiration



documented in this encounter



Administered Medications







 Medication Order  MAR Action  Action Date  Dose  Rate  Site









 acetaminophen (TYLENOL) tablet 500 mg



  



 500 mg, Oral, Q6HPRN, Starting Sun 19 at 2225, Until Discontinued, Routine
,  



 Pain (scale 1-3)  

 

   









 aspirin chewable tablet 81 mg



  Given  2019  9:38 AM CDT  81 mg    



 81 mg, Oral, DAILY, First dose on Mon 19          



 at 0900, Until Discontinued, Routine          









   

 

 dextrose 50 % in water (D50W) injection 25 mL



  



 25 mL, Slow IV Push, PRN, Starting Sun 19 at 2226, Until Discontinued, ASAP
,  



 Blood Glucose < or=70 mg/dL and patient is unable to swallow or has mental 
status  



 changes.  

 

   









 glipiZIDE (GLUCOTROL) tablet 5 mg



  Given  2019  9:38 AM CDT  5 mg    



 5 mg, Oral, BIDAC, First dose on 19 at          



 0830, Until Discontinued, Routine          









   

 

 glucagon (GLUCAGEN DIAGNOSTIC KIT) injection 1 mg



  



 1 mg, Intramuscular, PRN, Starting Sun 19 at 2226, Until Discontinued, ASAP
,  



 Blood Glucose < or=70 mg/dL and patient is unable to swallow or has mental 
changes.  

 

   









 insulin glargine (LANTUS U-100)  Given  2019 11:59 PM CDT  15 Units    
Abdomen-SC



 injection 15 Units



          



 15 Units, Subcutaneous, QHS,          



 First dose on 19 at 0000,          



 Until Discontinued, Routine          









   









 lactulose (CEPHULAC) solution 30 mL



  Given  2019  9:39 AM CDT  30 mL    



 30 mL, Oral, TID, First dose on 19 at          



 0200, Until Discontinued, Routine          









 Given  2019  2:04 AM CDT  30 mL    









   









 morpHINE injection 2 mg



  Given  2019  9:39 AM CDT  2 mg    



 2 mg, Slow IV Push, Q4HPRN, Starting Sun          



 19 at 2309, Until Discontinued, Routine,          



 Pain (scale 7-10)          









 Given  2019  4:24 AM CDT  2 mg    

 

 Given  2019 11:38 PM CDT  2 mg    









   









 ondansetron (ZOFRAN (PF)) injection 4 mg



  Given  2019 12:02 AM CDT  4 mg    



 4 mg, Slow IV Push, Q6HPRN, Starting [place]          



 19 at 2356, Until Discontinued, Routine,          



 Nausea and Vomiting (N/V)          









   









 pantoprazole (PROTONIX) EC tablet 40 mg



  Given  2019  9:39 AM CDT  40 mg    



 40 mg, Oral, DAILY, First dose on 19          



 at 0900, Until Discontinued, Routine          









   









 Sliding Scale Insulin - Lispro  Given  2019  8:24 AM CDT  8 Units    
Abdomen-SC



 (HumaLOG) + Fsbg Testing



          



 Subcutaneous, Q4H, First dose on          



 19 at 0800, Until          



 Discontinued, Routine          









   









 traMADol (ULTRAM) tablet 50 mg



  Given  2019 10:49 PM CDT  50 mg    



 50 mg, Oral, Q8HPRN, Starting Sun 19 at          



 2226, Until 19 at 2225, Routine, Pain          



 (scale 4-6)          









   









 









 Medication Order  MAR Action  Action Date  Dose  Rate  Site

 

 acetaminophen (TYLENOL) tablet  Given  2019  8:04 PM CDT  1,000 mg    



 1,000 mg



          



 1,000 mg, Oral, ONCE, 1 dose,          



 Sun 19 at 2045, Routine          









   









 dexamethasone (DECADRON PHOSPHATE) injection  Given  2019  8:04 PM CDT  
10 mg    



 10 mg



          



 10 mg, Oral, ONCE, 1 dose, Sun 19 at          



 2045, STAT          









   









 insulin regular human (HUMULIN R)  Given  2019  9:33 PM CDT  10 Units    
Abdomen-SC



 injection 10 Units



          



 10 Units, Subcutaneous, ONCE, 1          



 dose, Sun 19 at 2200, ASAP          









   









 ketorolac (TORADOL) injection 30 mg



  Given  2019  8:05 PM CDT  30 mg    



 30 mg, Slow IV Push, ONCE, 1 dose, Sun 19          



 at 2045, ASAP,  approving          



 Restricted medication: MINOR RAMOS          









   









 NaCl 0.9% (NS) bolus infusion  New Bag  2019  9:33 PM CDT  1,000 mL  999 
mL/hr  



 1,000 mL



          



 at 999 mL/hr, 1,000 mL, IV          



 Infusion, ONCE, 1 dose, Sun          



 19 at 2200, STAT          









   









 Sliding Scale Insulin - Lispro  Given  2019  4:03 AM CDT  6 Units    
Abdomen-SC



 (HumaLOG) + Fsbg Testing



          



 Subcutaneous, Q4H, First dose on          



 Mon 19 at 0000, Until          



 Discontinued, Routine          









 Given  2019 11:38 PM CDT  6 Units    Abdomen-SC









   



documented in this encounter



Insurance







 Payer  Benefit Plan /  Subscriber ID  Effective Dates  Phone  Address  Type



   Group          

 

 [employer]  [place]x  2019-Present  [phone]  [address]  Medicaid



   [place]        2004



  



           [place]  



           [address]  









 Guarantor Name  Account Type  Relation to  Date of Birth  Phone  Billing



     Patient      Address

 

 Joe Archuleta  Personal/Family  Self  1970  [phone]  [facility] [address]







         (Home)  [place]



           [id number]



documented as of this encounter
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
## 2019-09-02 NOTE — XMS REPORT
Summary of Care

 Created on:2019



Patient:Joe Archuleta

Sex:Male

:1970

External Reference #:[id number]





Demographics







 Address  [address]



   [place] [address]

 

 Mobile Phone  [phone]

 

 Home Phone  [phone]

 

 Phone  [phone]

 

 Preferred Language  English

 

 Marital Status  

 

 Temple Affiliation  Unknown

 

 Race  White

 

 Ethnic Group   or 









Author







 Organization  [facility]

 

 Address  [address] [place] [address]









Support







 Name  Relationship  Address  Phone

 

 Mandie Blair  Unavailable  [address]  [phone]



     [place] [address]  

 

 Chris Archuleta  Unavailable  Unavailable  [phone]









Care Team Providers







 Name  Role  Phone

 

 Pcp, Patient Does Not Have A  Primary Care Provider  [phone]

 

 Veronica Huff MD  Primary Care Provider  [phone]









Encounter Details







 Date  Type  Department  Care Team  Description

 

 2019  Orders Only  [facility]



  Doctor Unassigned, No  



     [address] [place] [address]



  Name



  



     [place] [address]  [address] [place] [address]  







Allergies

No Known Allergiesdocumented as of this encounter (statuses as of 2019)



Medications

No known medicationsdocumented as of this encounter (statuses as of 2019)



Active Problems







 Problem  Noted Date

 

 Hyperglycemia  2019

 

 Alcoholic cirrhosis  2019

 

 Chronic hepatitis C without hepatic coma  2019

 

 Chronic abdominal pain  2019

 

 Anxiety  2019

 

 Diabetes mellitus type 2 with complications, uncontrolled  2019

 

 Hyperammonemia  2019

 

 Obesity (BMI 30-39.9)  2019

 

 Hepatic encephalopathy  2015

 

 S/P TIPS (transjugular intrahepatic portosystemic shunt)  2015

 

 History of CVA (cerebrovascular accident)  2015

 

 History of MI (myocardial infarction)  2015



documented as of this encounter (statuses as of 2019)



Resolved Problems







 Problem  Noted Date  Resolved Date

 

 History of alcoholism  2019

 

 Hospice care patient  2015



documented as of this encounter (statuses as of 2019)



Immunizations







 Name  Administration Dates  Next Due

 

 Influenza Virus Vaccine Quad IM 3+ YRS  2015  

 

 Pneumococcal Polysaccharide, PPSV23 (PNEUMOVAX)  2015  



documented as of this encounter



Social History







 Tobacco Use  Types  Packs/Day  Years Used  Date

 

 Never Smoker        









 Smokeless Tobacco: Never Used      









 Education  Answer  Date Recorded

 

 What is the highest level of school you have completed or the  9th grade  



 highest degree you have received?    









 Financial Resource Strain  Answer  Date Recorded

 

 How hard is it for you to pay for the very basics like  Somewhat hard  2019



 food, housing, medical care, and heating?    









 Food Insecurity  Answer  Date Recorded

 

 Within the past 12 months, you worried that your food would  Never true  2019



 run out before you got money to buy more.    

 

 Within the past 12 months, the food you bought just didn't  Never true  2019



 last and you didn't have money to get more.    









 Transportation Needs  Answer  Date Recorded

 

 In the past 12 months, has lack of transportation kept you from  No  2019



 medical appointments or from getting medications?    

 

 In the past 12 months, has lack of transportation kept you from  No  2019



 meetings, work, or getting things needed for daily living?    









 Sex Assigned at Birth  Date Recorded

 

 Not on file  









 Job Start Date  Occupation  Industry

 

 Not on file  Not on file  Not on file









 Travel History  Travel Start  Travel End









 No recent travel history available.



documented as of this encounter



Last Filed Vital Signs

Not on filedocumented in this encounter



Plan of Treatment







 Date  Type  Specialty  Care Team  Description

 

 2019  Office Visit  Family Medicine  Veronica Huff MD



  



       [address] [place] [address] [place] [address] [phone] (Fax)  









 Health Maintenance  Due Date  Last Done  Comments

 

 EYE EXAM  1981    

 

 LDL-C  1981    

 

 URINE MICROALBUMIN  1981    

 

 FOOT EXAM  1989    

 

 DTaP,Tdap,and Td Vaccines (1 -  1990    



 Tdap)      

 

 INFLUENZA VACCINE (#1)  2019  

 

 HgA1C  2020, 2019  

 

 CREATININE (SERUM)  2020, 2019,  



     2019, Additional history  



     exists  

 

 PNEUMOCOCCAL 0-64 YEARS COMBINED  Completed  2015  



 SERIES      



documented as of this encounter



Procedures







 Procedure Name  Priority  Date/Time  Associated Diagnosis  Comments

 

 PATIENT QUESTIONNAIRE  Routine  2019 12:01 AM CDT    



documented in this encounter



Results

Not on filedocumented in this encounter



Insurance







 Payer  Benefit Plan /  Subscriber ID  Effective Dates  Phone  Address  Type



   Group          

 

 [employer] MEDICA[place] OF  xxxxxxxxx  2019-Present  [phone]  [address]  [place]icaid [place] [id number] [place], [place]  



           [address]  



documented as of this encounter

## 2019-09-02 NOTE — XMS REPORT
Summary of Care

 Created on:2019



Patient:Joe Archuleta

Sex:Male

:1970

External Reference #:[id number]





Demographics







 Address  [address]



   [place] [address]

 

 Mobile Phone  [phone]

 

 Home Phone  [phone]

 

 Phone  [phone]

 

 Preferred Language  English

 

 Marital Status  

 

 Judaism Affiliation  Unknown

 

 Race  White

 

 Ethnic Group   or 









Author







 Organization  [facility]

 

 Address  [address] [place] [address]









Support







 Name  Relationship  Address  Phone

 

 Mandie Blair  Unavailable  [address]  [phone]



     [place] [address]  

 

 Chris Archuleta  Unavailable  Unavailable  [phone]









Care Team Providers







 Name  Role  Phone

 

 Veronica Huff MD  Primary Care Provider  [phone]









Reason for Visit







 Reason  Comments

 

 Refill Request  Contour next 1 test strip







Encounter Details







 Date  Type  Department  Care Team  Description

 

 2019  Telephone  [facility] Family  Veronica Huff,  Refill Request



     Medicine - Shereen ZEDNEJAS



  (Contour next 1 test



     [address]



  [address]



  strip)



     [place]  



     [id number] [phone] (Fax)  







Allergies

No Known Allergiesdocumented as of this encounter (statuses as of 2019)



Medications







 Medication  Sig  Dispensed  Refills  Start Date  End Date  Status

 

 blood sugar  Use as directed,  100 Strip  0  2019    Active



 diagnostic (CONTOUR  once a day to          



 NEXT TEST STRIPS)  monitor blood          



 strip  glucose for ICD          



   code E11.9          

 

 lactulose 10 gram/15  Take 30 mL by    0      Suspended



 mL solution  mouth 3 (three)          



   times daily as          



   needed for          



   Constipation.          

 

 citalopram 20 mg  Take 20 mg by    0      Suspended



 tablet  mouth daily.          

 

 Insulin Glargine  inject 15 Units    0      Suspended



 (LANTUS SOLOSTAR  under the skin at          



 U-100 INSULIN) 100  bedtime as          



 unit/mL (3 mL)  needed.          



 injection            

 

 Insulin Needles,  Use as directed    0      Suspended



 Disposable, 32 gauge            



 x 5/32" Ndle            

 

 glimepiride 1 mg  Take 1 mg by    0      Suspended



 tablet  mouth daily with          



   breakfast.          



documented as of this encounter (statuses as of 2019)



Active Problems







 Problem  Noted Date

 

 Hyperglycemia  2019

 

 Alcoholic cirrhosis  2019

 

 Chronic hepatitis C without hepatic coma  2019

 

 Chronic abdominal pain  2019

 

 Anxiety  2019

 

 Diabetes mellitus type 2 with complications, uncontrolled  2019

 

 Hyperammonemia  2019

 

 Obesity (BMI 30-39.9)  2019

 

 Hepatic encephalopathy  2015

 

 S/P TIPS (transjugular intrahepatic portosystemic shunt)  2015

 

 History of CVA (cerebrovascular accident)  2015

 

 History of MI (myocardial infarction)  2015



documented as of this encounter (statuses as of 2019)



Resolved Problems







 Problem  Noted Date  Resolved Date

 

 History of alcoholism  2019

 

 Hospice care patient  2015



documented as of this encounter (statuses as of 2019)



Immunizations







 Name  Administration Dates  Next Due

 

 Influenza Virus Vaccine Quad IM 3+ YRS  2015  

 

 Pneumococcal Polysaccharide, PPSV23 (PNEUMOVAX)  2015  



documented as of this encounter



Social History







 Tobacco Use  Types  Packs/Day  Years Used  Date

 

 Never Smoker        









 Smokeless Tobacco: Never Used      









 Alcohol Use  Drinks/Week  oz/Week  Comments

 

 Not Currently      









 Education  Answer  Date Recorded

 

 What is the highest level of school you have completed or the  9th grade  



 highest degree you have received?    









 Financial Resource Strain  Answer  Date Recorded

 

 How hard is it for you to pay for the very basics like  Somewhat hard  2019



 food, housing, medical care, and heating?    









 Food Insecurity  Answer  Date Recorded

 

 Within the past 12 months, you worried that your food would  Never true  2019



 run out before you got money to buy more.    

 

 Within the past 12 months, the food you bought just didn't  Never true  2019



 last and you didn't have money to get more.    









 Transportation Needs  Answer  Date Recorded

 

 In the past 12 months, has lack of transportation kept you from  No  2019



 medical appointments or from getting medications?    

 

 In the past 12 months, has lack of transportation kept you from  No  2019



 meetings, work, or getting things needed for daily living?    









 Sex Assigned at Birth  Date Recorded

 

 Not on file  









 Job Start Date  Occupation  Industry

 

 Not on file  Not on file  Not on file









 Travel History  Travel Start  Travel End









 No recent travel history available.



documented as of this encounter



Last Filed Vital Signs

Not on filedocumented in this encounter



Plan of Treatment







 Date  Type  Specialty  Care Team  Description

 

 2019  Office Visit  Family Medicine  Veronica Huff MD



  



       [address] [place] [address] 



  



       [place] [id number] [phone] (Fax)  









 Health Maintenance  Due Date  Last Done  Comments

 

 EYE EXAM  1981    

 

 LDL-C  1981    

 

 URINE MICROALBUMIN  1981    

 

 FOOT EXAM  1989    

 

 DTaP,Tdap,and Td Vaccines ( -  1990    



 Tdap)      

 

 INFLUENZA VACCINE  2019  

 

 HgA1C  2020, 2019  

 

 CREATININE (SERUM)  2020, 2019,  



     2019, Additional history  



     exists  

 

 PNEUMOCOCCAL 0-64 YEARS COMBINED  Completed  2015  



 SERIES      



documented as of this encounter



Results

Not on filedocumented in this encounter



Insurance







 Payer  Benefit Plan /  Subscriber ID  Effective Dates  Phone  Address  Type



   Group          

 

 [facility]  MEDICAID OF  xxxxxxxxx  2019-Present  [phone]  [address]  Medicaid



   [place]        [id number] [place] [address]  



           [address]  



documented as of this encounter
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
## 2019-09-02 NOTE — XMS REPORT
Summary of Care

 Created on:2019



Patient:Joe Archuleta

Sex:Male

:1970

External Reference #:[id number]





Demographics







 Address  [address] [place] [address]

 

 Mobile Phone  [phone]

 

 Home Phone  [phone]

 

 Phone  [phone]

 

 Email Address  [email or web address]

 

 Preferred Language  English

 

 Marital Status  

 

 Anabaptist Affiliation  Unknown

 

 Race  White

 

 Ethnic Group   or 









Author







 Organization  [facility]

 

 Address  [address] [place] [address]









Support







 Name  Relationship  Address  Phone

 

 Mandie Blair  Unavailable  [address]  [phone]



     [place] [address]  









Care Team Providers







 Name  Role  Phone

 

 Veronica Huff MD  Primary Care Provider  [phone]









Reason for Visit







 Reason  Comments

 

 Notification  







Encounter Details







 Date  Type  Department  Care Team  Description

 

 2019  Telephone  [facility] Family Medicine  Veronica Huff MD



  Notification



     - [address] [address]



  



     [place] [address] [phone] (Fax)  







Allergies

No Known Allergiesdocumented as of this encounter (statuses as of 2019)



Medications







 Medication  Sig  Dispensed  Refills  Start Date  End Date  Status

 

 furosemide 40 mg  TAKE 1 TABLET BY    0  2019    Active



 tablet  MOUTH ONCE DAILY          

 

 spironolactone 100 mg  TAKE 1 TABLET BY    1  2019    Active



 tablet  MOUTH ONCE DAILY          

 

 Insulin Glargine  inject 15 Units  6 mL  2019    Active



 (LANTUS SOLOSTAR  under the skin at          



 U-100 INSULIN) 100  bedtime.          



 unit/mL (3 mL)            



 injectionIndications:            



 Diabetes mellitus            



 type 2 with            



 complications,            



 uncontrolled            

 

 Insulin Needles,  Use as directed  100 Each  2019    Active



 Disposable, (PEN  daily to inject          



 NEEDLE) 32 gauge x  insulin          



 "            



 NdleIndications:            



 Diabetes mellitus            



 type 2 with            



 complications,            



 uncontrolled            

 

 glimepiride 1 mg  Take 1 tablet by  30 tablet  5  2019    Active



 tabletIndications:  mouth daily with          



 Diabetes mellitus  breakfast.          



 type 2 with            



 complications,            



 uncontrolled            

 

 pantoprazole 40 mg EC  Take 1 tablet by    0  2019    Active



 tablet  mouth daily.          

 

 lactulose 10 gram/15  Take 30 mL by mouth  2700 mL  1  2019    Active



 mL  3 (three) times          



 solutionIndications:  daily as needed for          



 Hepatic  Constipation or          



 encephalopathy  Encephalophathy.          

 

 ENEMA BAG  Use as directed  1 Each  5  2019    Active



 MiscIndications:            



 Hepatic            



 encephalopathy,            



 Hyperammonemia            

 

 citalopram 20 mg  Take 1 tablet by  30 tablet  2  2019    Active



 tabletIndications:  mouth daily.          



 Anxiety            



documented as of this encounter (statuses as of 2019)



Active Problems







 Problem  Noted Date

 

 Hyperammonemia  2019

 

 Obesity (BMI 30-39.9)  2019

 

 Hepatic encephalopathy  2015

 

 S/P TIPS (transjugular intrahepatic portosystemic shunt)  2015

 

 Hospice care patient  2015

 

 History of CVA (cerebrovascular accident)  2015

 

 History of MI (myocardial infarction)  2015



documented as of this encounter (statuses as of 2019)



Immunizations







 Name  Administration Dates  Next Due

 

 Influenza Virus Vaccine Quad IM 3+ YRS  2015  

 

 Pneumococcal Polysaccharide, PPSV23 (PNEUMOVAX)  2015  



documented as of this encounter



Social History







 Tobacco Use  Types  Packs/Day  Years Used  Date

 

 Never Smoker        









 Smokeless Tobacco: Never Used      









 Alcohol Use  Drinks/Week  oz/Week  Comments

 

 Not Currently      









 Education  Answer  Date Recorded

 

 What is the highest level of school you have completed or the  9th grade  



 highest degree you have received?    









 Financial Resource Strain  Answer  Date Recorded

 

 How hard is it for you to pay for the very basics like  Somewhat hard  2019



 food, housing, medical care, and heating?    









 Food Insecurity  Answer  Date Recorded

 

 Within the past 12 months, you worried that your food would  Never true  2019



 run out before you got money to buy more.    

 

 Within the past 12 months, the food you bought just didn't  Never true  2019



 last and you didn't have money to get more.    









 Transportation Needs  Answer  Date Recorded

 

 In the past 12 months, has lack of transportation kept you from  No  2019



 medical appointments or from getting medications?    

 

 In the past 12 months, has lack of transportation kept you from  No  2019



 meetings, work, or getting things needed for daily living?    









 Sex Assigned at Birth  Date Recorded

 

 Not on file  









 Job Start Date  Occupation  Industry

 

 Not on file  Not on file  Not on file









 Travel History  Travel Start  Travel End









 No recent travel history available.



documented as of this encounter



Last Filed Vital Signs

Not on filedocumented in this encounter



Plan of Treatment







 Date  Type  Specialty  Care Team  Description

 

 2019  Office Visit  Family Medicine  Veronica Huff MD



  



       [address] [place] [address] [place] [address] [phone] (Fax)  









 Health Maintenance  Due Date  Last Done  Comments

 

 EYE EXAM  1981    

 

 LDL-C  1981    

 

 URINE MICROALBUMIN  1981    

 

 FOOT EXAM  1989    

 

 DTaP,Tdap,and Td Vaccines (1 -  1990    



 Tdap)      

 

 INFLUENZA VACCINE  2019  

 

 HgA1C  2020  

 

 CREATININE (SERUM)  2020, 2019,  



     2019, Additional history  



     exists  

 

 PNEUMOCOCCAL 0-64 YEARS COMBINED  Completed  2015  



 SERIES      



documented as of this encounter



Results

Not on filedocumented in this encounter



Insurance







 Payer  Benefit Plan /  Subscriber ID  Effective Dates  Phone  Address  Type



   Group          

 

 [employer] MEDICAID OF  xxxxxxxxx  2019-Present  [phone]  [address]  Medicaid [place]        [id number] [place] [address]  



           [address]  



documented as of this encounter

## 2019-09-02 NOTE — XMS REPORT
Summary of Care

 Created on:2019



Patient:Joe Archuleta

Sex:Male

:1970

External Reference #:[id number]





Demographics







 Address  [address] [place] [address]

 

 Mobile Phone  [phone]

 

 Home Phone  [phone]

 

 Phone  [phone]

 

 Email Address  [email or web address]

 

 Preferred Language  English

 

 Marital Status  

 

 Sikh Affiliation  Unknown

 

 Race  White

 

 Ethnic Group   or 









Author







 Organization  [facility]

 

 Address  [address] [place] [address]









Support







 Name  Relationship  Address  Phone

 

 Mandie Blair  Unavailable  [address]  [phone]



     [place] [address]  









Care Team Providers







 Name  Role  Phone

 

 Veronica Huff MD  Primary Care Provider  [phone]









Reason for Referral

 (ASAP)





 Status  Reason  Specialty  Diagnoses /  Referred By  Referred To



       Procedures  Contact  Contact

 

 Authorized  Location  Gastroenterology  Diagnoses



Hepatic encephalopathy



Chronic hepatitis C without hepatic coma



Chronic abdominal pain  Amee Huff



   Preference    



Procedures



CONSULT/REFERRAL GASTROENTEROLOGY  yoan Funez, Abimael HAIR MD MD







         [address] [place] [address]  [address]







         [address]



  [place],



         Phone:  [place]



         [phone]







         Fax:  Phone:



         [phone]







           Fax:



           [phone]



           6









Reason for Visit







 Reason  Comments

 

 Follow-up  F-Up from ER







Encounter Details







 Date  Type  Department  Care Team  Description

 

 2019  Office Visit  [facility] Family  Veronica Huff  Diabetes 
mellitus type 2 with complications, uncontrolled (Primary Dx);



     Medicine - Shereen MCKEON MD



  Hepatic encephalopathy;



     [address]



  [address] [place] [address] 



  Chronic hepatitis C without hepatic coma;



     [place], [place]  Chronic abdominal pain;



     [phone]



  Anxiety;



     [phone]



  Hyperammonemia;



       [phone] (Fax)  History of alcoholism







Allergies

No Known Allergiesdocumented as of this encounter (statuses as of 2019)



Medications







 Medication  Sig  Dispensed  Refills  Start Date  End Date  Status

 

 furosemide 40 mg  TAKE 1 TABLET BY    0  2019    Active



 tablet  MOUTH ONCE DAILY          

 

 spironolactone 100  TAKE 1 TABLET BY    1  2019    Active



 mg tablet  MOUTH ONCE DAILY          

 

 Insulin Glargine  inject 15 Units  6 mL  2019    Active



 (LANTUS SOLOSTAR  under the skin at          



 U-100 INSULIN) 100  bedtime.          



 unit/mL (3 mL)            



 injectionIndication            



 s: Diabetes            



 mellitus type 2            



 with complications,            



 uncontrolled            

 

 Insulin Needles,  Use as directed  100 Each  2019    Active



 Disposable, (PEN  daily to inject          



 NEEDLE) 32 gauge x  insulin          



 "            



 NdleIndications:            



 Diabetes mellitus            



 type 2 with            



 complications,            



 uncontrolled            

 

 glimepiride 1 mg  Take 1 tablet by  30 tablet  5  2019    Active



 tabletIndications:  mouth daily with          



 Diabetes mellitus  breakfast.          



 type 2 with            



 complications,            



 uncontrolled            

 

 pantoprazole 40 mg  Take 1 tablet by    0  2019    Active



 EC tablet  mouth daily.          

 

 lactulose 10  Take 30 mL by  2700 mL  1  2019    Active



 gram/15 mL  mouth 3 (three)          



 solutionIndications  times daily as          



 : Hepatic  needed for          



 encephalopathy  Constipation or          



   Encephalophathy.          

 

 ENEMA BAG  Use as directed  1 Each  2019    Active



 MiscIndications:            



 Hepatic            



 encephalopathy,            



 Hyperammonemia            

 

 citalopram 20 mg  Take 1 tablet by  30 tablet  2  2019    Active



 tabletIndications:  mouth daily.          



 Anxiety            

 

 metFORMIN 500 mg  TAKE 1 TABLET BY    0  2019  Discontinued



 tablet  MOUTH ONCE DAILY        19  

 

 busPIRone 10 mg  Take 10 mg by    0  2019  Discontinued



 tablet  mouth 2 (two)        19  



   times daily.          

 

 LACTULOSE ORAL  Take  by mouth.    0  2016  Discontinued



           19  



documented as of this encounter (statuses as of 2019)



Active Problems







 Problem  Noted Date

 

 History of alcoholism  2019

 

 Chronic hepatitis C without hepatic coma  2019

 

 Chronic abdominal pain  2019

 

 Anxiety  2019

 

 Diabetes mellitus type 2 with complications, uncontrolled  2019

 

 Hyperammonemia  2019

 

 Obesity (BMI 30-39.9)  2019

 

 Hepatic encephalopathy  2015

 

 S/P TIPS (transjugular intrahepatic portosystemic shunt)  2015

 

 History of CVA (cerebrovascular accident)  2015

 

 History of MI (myocardial infarction)  2015



documented as of this encounter (statuses as of 2019)



Resolved Problems







 Problem  Noted Date  Resolved Date

 

 Hospice care patient  2015



documented as of this encounter (statuses as of 2019)



Immunizations







 Name  Administration Dates  Next Due

 

 Influenza Virus Vaccine Quad IM 3+ YRS  2015  

 

 Pneumococcal Polysaccharide, PPSV23 (PNEUMOVAX)  2015  



documented as of this encounter



Social History







 Tobacco Use  Types  Packs/Day  Years Used  Date

 

 Never Smoker        









 Smokeless Tobacco: Never Used      









 Alcohol Use  Drinks/Week  oz/Week  Comments

 

 Not Currently      









 Education  Answer  Date Recorded

 

 What is the highest level of school you have completed or the  9th grade  



 highest degree you have received?    









 Financial Resource Strain  Answer  Date Recorded

 

 How hard is it for you to pay for the very basics like  Somewhat hard  2019



 food, housing, medical care, and heating?    









 Food Insecurity  Answer  Date Recorded

 

 Within the past 12 months, you worried that your food would  Never true  2019



 run out before you got money to buy more.    

 

 Within the past 12 months, the food you bought just didn't  Never true  2019



 last and you didn't have money to get more.    









 Transportation Needs  Answer  Date Recorded

 

 In the past 12 months, has lack of transportation kept you from  No  2019



 medical appointments or from getting medications?    

 

 In the past 12 months, has lack of transportation kept you from  No  2019



 meetings, work, or getting things needed for daily living?    









 Sex Assigned at Birth  Date Recorded

 

 Not on file  









 Job Start Date  Occupation  Industry

 

 Not on file  Not on file  Not on file









 Travel History  Travel Start  Travel End









 No recent travel history available.



documented as of this encounter



Last Filed Vital Signs







 Vital Sign  Reading  Time Taken  Comments

 

 Blood Pressure  137/80  2019 11:22 AM CDT  

 

 Pulse  70  2019 11:22 AM CDT  

 

 Temperature  37.1 C (98.7 F)  2019 11:22 AM CDT  

 

 Respiratory Rate  -  -  

 

 Oxygen Saturation  -  -  

 

 Inhaled Oxygen Concentration  -  -  

 

 Weight  98.4 kg (217 lb)  2019 11:22 AM CDT  

 

 Height  185.4 cm (6' 1")  2019 11:22 AM CDT  

 

 Body Mass Index  28.63  2019 11:22 AM CDT  



documented in this encounter



Patient Instructions

Patient InstructionsVeronica Huff MD - 2019 11:30 AM CDT

Ammonia

Does this test have other names?

Blood ammonia test, NH3

What is this test?

This test checks the level of ammonia in your blood. The test helps find out 
why you may have changes in consciousness and also helps diagnose a liver 
disease called hepatic encephalopathy. This disease affects how your brain works
, because of excess toxins, or poisons, in your body.

Your liver may not work properly if you have high levels of ammonia in your 
blood. Ammonia is a chemical made by bacteria in your intestines and your body'
s cells while you process protein. Your body treats ammonia as a waste product. 
It turns it into an amino acid called glutamine and a chemical compound called 
urea. Your bloodstream moves the urea to your kidneys, where it is eliminated 
in your urine.

But ammonia will build up in your body if you can't get rid of urea. This can 
sometimes happen if you have kidney or liver failure. It can also happen if you 
have a urea cycle disorder, a genetic disorder that means your body is missing 
one of the enzymes that remove ammonia from the blood. The ammonia blood test 
is the gold standard for diagnosing urea cycle disorders.

Too much ammonia in your body can cause psychological problems like confusion, 
tiredness, and possibly coma or death.

A child's reaction to too much ammonia can include seizures, breathing 
difficulties, and potentiallydeath.

Why do I need this test?

You might have this test if you have abnormal neurological changes or you enter 
a coma unexpectedly.You might also have this test if your healthcare provider 
suspects that you have a urea cycle disorder or Reye's syndrome, a potentially 
fatal disease that affects the brain and liver. Children may have this test if 
they frequently vomit or are very tired within a week after a virus-related 
illness.

What other tests might I have along with this test?

If your healthcare provider suspects that you have a urea cycle disorder, he or 
she may order other tests that look at ammonia levels in your body.

What do my test results mean?

Many things may affect your lab test results. These include the method each lab 
uses to do the test.Even if your test results are different from the normal 
value, you may not have a problem. To learn what the results mean for you, talk 
with your healthcare provider.

Test results are given in micrograms per deciliter (mcg/dL).

Normal ranges are:

 Age 0 to 10 days (enzymatic): 170 to 341 mcg/dL

 Infants and toddlers, from 10 days to 2 years old (enzymatic): 68 to 136 mcg/
dL

 Children, older than 2 years (enzymatic): 19 to 60 mcg/dL

 Adults: 10 to 80 mcg/dL

If your test results are higher than normal, it can mean that you have:

 Liver disease

 Reye's syndrome

People who have a portacaval shunt in their liver to treat high blood pressure 
may also have higher levels of ammonia.

Levels that are lower than normal can mean that your kidneys aren't removing 
waste as they should.

How is this test done?

The test requires a blood sample, which is drawn through a needle from a vein 
in your arm.

Does this test pose any risks?

Taking a blood sample with a needle carries risks that include bleeding, 
infection, bruising, or feeling dizzy. When the needle pricks your arm, you may 
feel a slight stinging sensation or pain. Afterward, the site may be slightly 
sore.

What might affect my test results?

Medications such as polymyxin B, diuretics, valproic acid and methicillin can 
cause higher-than-normal results. Other medications, including tetracycline, 
lactulose, monoamine oxidase inhibitors, or neomycin, can cause results that 
are lower than normal.

How do I get ready for this test?

You should avoid exercising or smoking cigarettes before this test, but no 
other preparation is needed. Be sure your healthcare provider knows about all 
medicines, herbs, vitamins, and supplements you are taking. This includes 
medicines that don't need a prescription and any illicit drugs you may use.

 8482-0665 [employer]. [address] [place] [address]. All rights reserved. This information is not intended as a substitute 
for professional medical care. Always follow your healthcare professional's 
instructions.



Electronically signed by Veronica Huff MD at 2019 12:11 PM CDT

documented in this encounter



Progress Notes

Veronica Huff MD - 2019 11:30 AM CDTFormatting of this note might 
be different from the original.

Cc:

Chief Complaint

Patient presents with

 Follow-up

  F-Up from ER-liver disease



HPI

Joe Archuleta is a 49 year old male with a h/o alcoholic cirrhosis, hepatic 
encephalopathy, chronicHep C, s/p TIPS, and recently dx'ed DM2 who presents as 
a new patient to establish care and for ER follow-up.  He is accompanied by his 
wife.  The patient presented to [facility] ER this morning with complaint of 
vomiting and confusion.  The patient underwent the following evaluations:  
Labs.  The patient was diagnosed with hepatic encephalopathy and uncontrolled 
DM2 with hyperglycemia.  Treatments in the ER included:  Lactulose, insulin, 
and IV fluids.  At discharge the patient was instructed to takethe following 
medications:  No new medications were prescribed, he was told to continue his 
chronic meds.  The patient was told to follow-up with us this morning for his 
already scheduled new patient appt.  Since the ER visit the patient has 
experienced the following:  He is feeling better overall than he was prior to 
his ER visit but he reports a continuation of a myriad of symptoms including 
anxiety and restlessness, headaches, hyperglycemia, and chronic abdominal pain.
  His wife reports he has problems with having regular bowel movements even 
when he takes his lactulose regularly.  She says she usually have to give him 
lactulose enemas to get him cleared-out and requests an enema bag so she zeeshan 
this PRN.  She says his chronic abdominal pain is due to "a swollen pancreas" 
and he used to be on methadone for pain management [place] 
[place].  He says the buspirone prescribed for his anxiety "does nothing" and he 
did much better on Xanax in the past.  He was just diagnosed with diabetes 
about 3 weeks ago and he was started on metformin.  They report his glucoses 
have remained in the 300s-400s despite him taking the metformin as prescribed.  
He admits he doesn't follow a diabeticdiet and he has polyphagia.  He says he 
has avoided drugs since his last ER a couple of weeks ago and he has been 
abstinent from alcohol x about 6 months.  He says he recently moved to the area 
to "start over" after a prison stint.  He says he wants to "clean up" and get 
his chronic Hep C treated and hopefully qualify for a liver transplant.  He has 
not established with a GI/Hepatology specialist yetand requests a referral.  He 
says at this time he is unable to see a specialist out of town due to lack of 
transportation.



Allergies

Joe has No Known Allergies.



Medications

Outpatient Medications Prior to Visit

Medication Sig Dispense Refill

 furosemide 40 mg tablet TAKE 1 TABLET BY MOUTH ONCE DAILY  0

 spironolactone 100 mg tablet TAKE 1 TABLET BY MOUTH ONCE DAILY  1

 pantoprazole 40 mg EC tablet Take 1 tablet by mouth daily.  0

 busPIRone 10 mg tablet Take 10 mg by mouth 2 (two) times daily.  0

 LACTULOSE ORAL Take  by mouth.

 metFORMIN 500 mg tablet TAKE 1 TABLET BY MOUTH ONCE DAILY  0

 lactulose 300 mL (Added to water or saline 700 mL) enema 1 Enema



No facility-administered medications prior to visit.



Histories

Past Medical History:

Diagnosis Date

 Anxiety

 Cirrhosis

 Depression

 Diabetes mellitus

 Esophageal varices

 Hepatitis

 Hepatitis C

 History of urinary tract infection

 Hypertension

 Memory loss

 Myocardial infarction 

 Small bowel obstruction

 Stroke 

 left-side weakness; improved after 1 year



Past Surgical History:

Procedure Laterality Date

 EXPLORATORY LAPAROTOMY

 SBO; lysis of adhesions; no resection

 LAPAROSCOPIC CHOLECYSTECTOMY

 OPEN APPENDECTOMY

 TRANSJUGULAR INTRAHEPATIC POROSYSTEMIC SHUNT



Social History



Socioeconomic History

 Marital status: 

  Spouse name: Miguel Archuleta

 Number of children: Not on file

 Years of education: Not on file

 Highest education level: 9th grade

Occupational History

 Occupation: unemployed

Social Needs

 Financial resource strain: Somewhat hard

 Food insecurity:

  Worry: Never true

  Inability: Never true

 Transportation needs:

  Medical: No

  Non-medical: No

Tobacco Use

 Smoking status: Never Smoker

 Smokeless tobacco: Never Used

Substance and Sexual Activity

 Alcohol use: Not Currently

 Drug use: Not on file

 Sexual activity: Not on file

Lifestyle

 Physical activity:

  Days per week: Not on file

  Minutes per session: Not on file

 Stress: Not on file

Relationships

 Social connections:

  Talks on phone: Not on file

  Gets together: Not on file

  Attends Islam service: Not on file

  Active member of club or organization: Not on file

  Attends meetings of clubs or organizations: Not on file

  Relationship status: Not on file

 Intimate partner violence:

  Fear of current or ex partner: Not on file

  Emotionally abused: Not on file

  Physically abused: Not on file

  Forced sexual activity: Not on file

Other Topics Concern

 Not on file

Social History Narrative

 Not on file



Family History

Problem Relation Age of Onset

 No Significant Medical Problems Mother

 No Significant Medical Problems Father



Review of Systems

Constitutional: Positive for unexpected weight change and weight gain.

HENT: Negative.

Eyes: Negative.

Respiratory: Negative.

Cardiovascular: Negative.

Gastrointestinal: Positive for abdominal pain and constipation.

Genitourinary: Negative.

Musculoskeletal: Negative.

Skin: Negative.

Neurological: Positive for headaches.

Psychiatric/Behavioral: Positive for agitation. The patient is nervous/anxious.

Endocrine: Positive for polyphagia and weight gain.



Vital Signs

/80 (BP Location: Left arm, Patient Position: Sitting, BP CUFF SIZE: 
Adult Medium)  | Pulse 70 | Temp 37.1 C (98.7 F) (Oral)  | Ht 6' 1" (1.854 m
)  | Wt 217 lb (98.4 kg)  | BMI 28.63 kg/m



Physical Exam

Constitutional: He is oriented to person, place, and time. He appears well-
developed and well-nourished. No distress.

HENT:

Head: Normocephalic and atraumatic.

Mouth/Throat: Mucous membranes are normal.

Eyes: Pupils are equal, round, and reactive to light. Conjunctivae are normal. 
No scleral icterus.

Neck: Neck supple. No thyromegaly present.

Cardiovascular: Normal rate, regular rhythm, normal heart sounds and intact 
distal pulses. Exam reveals no gallop and no friction rub.

No murmur heard.

Pulmonary/Chest: Effort normal and breath sounds normal. He has no wheezes. He 
has no rales.

Abdominal: Soft. Bowel sounds are normal. He exhibits distension (very slight). 
He exhibits no mass.There is no tenderness. There is no rebound and no guarding.

Musculoskeletal: He exhibits no edema.

Lymphadenopathy:

  He has no cervical adenopathy.

Neurological: He is alert and oriented to person, place, and time.

Skin: Skin is warm and dry. No rash noted. No pallor.

Psychiatric: His speech is normal. His mood appears anxious. He is agitated and 
hyperactive. Thoughtcontent is paranoid. Thought content is not delusional. 
Cognition and memory are impaired. He expresses impulsivity. He expresses no 
homicidal and no suicidal ideation.

Nursing note and vitals reviewed.



Labs

Admission on 2019, Discharged on 2019

Component Date Value

 NA 2019 140

 K 2019 4.5

 CL 2019 110*

 CO2 TOTAL 2019 22*

 AGAP 2019 8

 BUN 2019 14

 GLUCOSE 2019 362*

 CREATININE 2019 0.58*

 TOTAL BILI 2019 1.9*

 CALCIUM 2019 9.4

 T PROTEIN 2019 7.2

 ALBUMIN 2019 3.5

 ALK PHOS 2019 152*

 ALT(SGPT) 2019 72*

 AST(SGOT) 2019 86*

 eGFR Calculation (Non-Af* 2019 149.5

 eGFR Calculation (Karin* 2019 181.2

 AMMONIA 2019 303*

 WBC 2019 3.48*

 RBC 2019 4.59

 HGB 2019 14.2

 HCT 2019 39.9

 MCV 2019 86.9

 MCH 2019 30.9

 MCHC 2019 35.6*

 RDW-SD 2019 43.8

 RDW-CV 2019 14.0

 PLT 2019 77*

 MPV 2019 12.5

 IPF % 2019 3.9

 NRBC/100 WBC 2019 0.0

 NRBC x10^3 2019 &lt;0.01

 GRAN MAT (NEUT) % 2019 52.9

 IMM GRAN % 2019 0.30

 LYMPH % 2019 27.6

 MONO % 2019 13.8

 EOS % 2019 4.3

 BASO % 2019 1.1

 GRAN MAT x10^3(ANC) 2019 1.84*

 IMM GRAN x10^3 2019 &lt;0.03

 LYMPH x10^3 2019 0.96*

 MONO x10^3 2019 0.48

 EOS x10^3 2019 0.15

 BASO x10^3 2019 0.04

 TEARDROP CELLS 2019 2+*

Admission on 2019, Discharged on 2019

Component Date Value

 NA 2019 140

 K 2019 4.5

 CL 2019 111*

 CO2 TOTAL 2019 21*

 AGAP 2019 8

 BUN 2019 14

 GLUCOSE 2019 329*

 CREATININE 2019 0.61

 TOTAL BILI 2019 2.2*

 CALCIUM 2019 8.5*

 T PROTEIN 2019 7.1

 ALBUMIN 2019 3.4*

 ALK PHOS 2019 152*

 ALT(SGPT) 2019 69*

 AST(SGOT) 2019 80*

 eGFR Calculation (Non-Af* 2019 141.1

 eGFR Calculation (Karin* 2019 171.0

 APPEARANCE 2019 Slightly Hazy*

 COLOR 2019 Yellow

 PH 2019 8.0

 SP GRAVITY 2019 1.015

 GLU U QUAL 2019 &gt;1000 mg/dL*

 BLOOD 2019 Negative

 KETONES 2019 Negative

 PROTEIN 2019 Negative

 UROBILIN 2019 2.0 mg/dL*

 BILIRUBIN 2019 Negative

 NITRITE 2019 Negative

 LEUK BUCKY 2019 Negative

 RBC/HPF 2019 0

 WBC/HPF 2019 1

 BACTERIA 2019 Negative

 AMMONIA 2019 171*

 WBC 2019 3.53*

 RBC 2019 4.65

 HGB 2019 14.3

 HCT 2019 41.2

 MCV 2019 88.6

 MCH 2019 30.8

 MCHC 2019 34.7

 RDW-SD 2019 43.8

 RDW-CV 2019 13.6

 PLT 2019 79*

 MPV 2019 12.3

 IPF % 2019 3.4

 NRBC/100 WBC 2019 0.0

 NRBC x10^3 2019 &lt;0.01

 GRAN MAT (NEUT) % 2019 67.2

 IMM GRAN % 2019 0.30

 LYMPH % 2019 18.1

 MONO % 2019 10.5

 EOS % 2019 2.8

 BASO % 2019 1.1

 GRAN MAT x10^3(ANC) 2019 2.37

 IMM GRAN x10^3 2019 &lt;0.03

 LYMPH x10^3 2019 0.64*

 MONO x10^3 2019 0.37

 EOS x10^3 2019 0.10

 BASO x10^3 2019 0.04

Admission on 2019, Discharged on 2019

Component Date Value

 NA 2019 139

 K 2019 4.0

 CL 2019 112*

 CO2 TOTAL 2019 21*

 AGAP 2019 6

 BUN 2019 14

 GLUCOSE 2019 444*

 CREATININE 2019 0.58*

 TOTAL BILI 2019 1.2*

 CALCIUM 2019 8.0*

 T PROTEIN 2019 5.9*

 ALBUMIN 2019 2.7*

 ALK PHOS 2019 121

 ALT(SGPT) 2019 53*

 AST(SGOT) 2019 66*

 eGFR Calculation (Non-Af* 2019 149.5

 eGFR Calculation (Karin* 2019 181.2

 LIPASE 2019 116

 APPEARANCE 2019 Clear

 COLOR 2019 Yellow

 PH 2019 6.0

 SP GRAVITY 2019 1.015

 GLU U QUAL 2019 &gt;1000 mg/dL*

 BLOOD 2019 Trace*

 KETONES 2019 Negative

 PROTEIN 2019 Negative

 UROBILIN 2019 2.0 mg/dL*

 BILIRUBIN 2019 Negative

 NITRITE 2019 Negative

 LEUK BUCKY 2019 Negative

 RBC/HPF 2019 3

 WBC/HPF 2019 3

 BACTERIA 2019 Negative

 SQ EPITH 2019 2

 PH 2019 7.44*

 PCO2 CHEMA 2019 33*

 PO2 CHEMA 2019 70*

 HCO3 CHEMA 2019 22*

 AC VBE(BEAKER) 2019 -1.7

 WBC 2019 3.12*

 RBC 2019 3.95*

 HGB 2019 12.1*

 HCT 2019 35.3*

 MCV 2019 89.4

 MCH 2019 30.6

 MCHC 2019 34.3

 RDW-SD 2019 44.8

 RDW-CV 2019 13.8

 PLT 2019 70*

 MPV 2019 12.6

 IPF % 2019 3.3

 NRBC/100 WBC 2019 0.0

 NRBC x10^3 2019 &lt;0.01

 GRAN MAT (NEUT) % 2019 51.3

 IMM GRAN % 2019 0.00

 LYMPH % 2019 28.5

 MONO % 2019 15.4

 EOS % 2019 3.8

 BASO % 2019 1.0

 GRAN MAT x10^3(ANC) 2019 1.60*

 IMM GRAN x10^3 2019 &lt;0.03

 LYMPH x10^3 2019 0.89*

 MONO x10^3 2019 0.48

 EOS x10^3 2019 0.12

 BASO x10^3 2019 0.03

 POCT Glu (age&gt;30days) 2019 401*

 POCT Glu (age&gt;30days) 2019 361*

 POCT GLU 2019 401*

 POCT GLU 2019 361*

Admission on 2019, Discharged on 2019

Component Date Value

 APPEARANCE 2019 Clear

 COLOR 2019 Yellow

 PH 2019 6.0

 SP GRAVITY 2019 1.025

 GLU U QUAL 2019 Negative

 BLOOD 2019 Negative

 KETONES 2019 Negative

 PROTEIN 2019 Negative

 UROBILIN 2019 1.0 mg/dL

 BILIRUBIN 2019 Negative

 NITRITE 2019 Negative

 LEUK BUCKY 2019 Negative

 RBC/HPF 2019 0

 WBC/HPF 2019 0

 BACTERIA 2019 Negative

 NA 2019 141

 K 2019 4.6

 CL 2019 113*

 CO2 TOTAL 2019 24

 AGAP 2019 4

 BUN 2019 14

 GLUCOSE 2019 269*

 CREATININE 2019 0.46*

 CALCIUM 2019 8.4*

 eGFR Calculation (Non-Af* 2019 195.4

 eGFR Calculation (Karin* 2019 236.8

 TOTAL BILI 2019 1.4*

 BILI UNCON 2019 1.3*

 BILI CONJ 2019 0.0

 T PROTEIN 2019 6.1*

 ALBUMIN 2019 2.7*

 ALK PHOS 2019 105

 ALT(SGPT) 2019 47

 AST(SGOT) 2019 79*

 LIPASE 2019 181

 TROPONIN I 2019 0.008

 APTT Patient 2019 38

 PROTIME PATIENT 2019 16.6*

 INR 2019 1.4

 ALCOHOL 2019 &lt;10

 BENZO U 2019 Negative

 BECKY U 2019 Negative

 AMPHET 2019 Negative

 THC 2019 Negative

 METHADONE 2019 Negative

 Meth U 2019 Presumptive Positive*

 OPIATES 2019 Negative

 Cocaine Metabolite 2019 Negative

 PROPOXY 2019 Negative

 Tric U 2019 Negative

 PCP 2019 Negative

 OXYCOD 2019 Negative

 WBC 2019 2.30*

 RBC 2019 3.92*

 HGB 2019 12.0*

 HCT 2019 35.3*

 MCV 2019 90.1

 MCH 2019 30.6

 MCHC 2019 34.0

 RDW-SD 2019 45.0

 RDW-CV 2019 14.0

 PLT 2019 68*

 MPV 2019 12.9

 IPF % 2019 3.5

 NRBC/100 WBC 2019 0.0

 NRBC x10^3 2019 &lt;0.01

 GRAN MAT (NEUT) % 2019 43.4

 IMM GRAN % 2019 0.00

 LYMPH % 2019 28.3

 MONO % 2019 20.9

 EOS % 2019 6.1

 BASO % 2019 1.3

 GRAN MAT x10^3(ANC) 2019 1.00*

 IMM GRAN x10^3 2019 &lt;0.03

 LYMPH x10^3 2019 0.65*

 MONO x10^3 2019 0.48

 EOS x10^3 2019 0.14

 BASO x10^3 2019 0.03

 AMMONIA 2019 90*

 HGB A1C 2019 7.1*

 NA 2019 141

 K 2019 4.2

 CL 2019 114*

 CO2 TOTAL 2019 24

 AGAP 2019 3

 BUN 2019 13

 GLUCOSE 2019 157*

 CREATININE 2019 0.51*

 CALCIUM 2019 8.2*

 eGFR Calculation (Non-Af* 2019 173.5

 eGFR Calculation (Karin* 2019 210.2

 PROTIME PATIENT 2019 15.9*

 INR 2019 1.3

 MAGNESIUM 2019 1.9

 AMMONIA 2019 91*

 WBC 2019 3.04*

 RBC 2019 4.06*

 HGB 2019 12.6

 HCT 2019 36.6*

 MCV 2019 90.1

 MCH 2019 31.0

 MCHC 2019 34.4

 RDW-SD 2019 45.7

 RDW-CV 2019 14.2

 PLT 2019 84*

 MPV 2019 11.8

 NRBC/100 WBC 2019 0.0

 NRBC x10^3 2019 &lt;0.01

 GRAN MAT (NEUT) % 2019 42.2

 IMM GRAN % 2019 0.30

 LYMPH % 2019 33.2

 MONO % 2019 15.8

 EOS % 2019 7.2

 BASO % 2019 1.3

 GRAN MAT x10^3(ANC) 2019 1.28*

 IMM GRAN x10^3 2019 &lt;0.03

 LYMPH x10^3 2019 1.01*

 MONO x10^3 2019 0.48

 EOS x10^3 2019 0.22

 BASO x10^3 2019 0.04

 POCT GLU 2019 212*

 POCT GLU 2019 147*

 TOTAL BILI 2019 1.4*

 BILI UNCON 2019 1.3*

 BILI CONJ 2019 0.0

 T PROTEIN 2019 6.2*

 ALBUMIN 2019 2.8*

 ALK PHOS 2019 107

 ALT(SGPT) 2019 54*

 AST(SGOT) 2019 74*

 POCT GLU 2019 216*

 Amphetamine-LCMS 2019 159*

 Amphetamine-Creatinine N* 2019 125

 Amphetamine-Interpretati* 2019 Positive*

 METHAMPH-LCMS 2019 468*

 METHAMPH-CREATININE NORM* 2019 369

 METHAMPH-INTERPREPRETATI* 2019 Positive*

 BENZOYLE-INTERPRETATION 2019 Negative

 PHENCYCLID-INTERPRETATION 2019 Negative

 CREAT U 2019 126.9

 POCT GLU 2019 277*

 POCT GLU 2019 240*

 NA 2019 143

 K 2019 4.3

 CL 2019 112*

 CO2 TOTAL 2019 26

 AGAP 2019 5

 BUN 2019 14

 GLUCOSE 2019 246*

 CREATININE 2019 0.58*

 TOTAL BILI 2019 1.2*

 CALCIUM 2019 8.5*

 T PROTEIN 2019 6.2*

 ALBUMIN 2019 2.8*

 ALK PHOS 2019 120

 ALT(SGPT) 2019 59*

 AST(SGOT) 2019 73*

 eGFR Calculation (Non-Af* 2019 149.5

 eGFR Calculation (Karin* 2019 181.2

 AMMONIA 2019 78*

 WBC 2019 3.31*

 RBC 2019 4.47

 HGB 2019 13.6

 HCT 2019 40.5

 MCV 2019 90.6

 MCH 2019 30.4

 MCHC 2019 33.6

 RDW-SD 2019 44.5

 RDW-CV 2019 14.1

 PLT 2019 86*

 MPV 2019 12.3

 NRBC/100 WBC 2019 0.0

 NRBC x10^3 2019 &lt;0.01

 GRAN MAT (NEUT) % 2019 46.6

 IMM GRAN % 2019 0.00

 LYMPH % 2019 32.0

 MONO % 2019 13.3

 EOS % 2019 6.9

 BASO % 2019 1.2

 GRAN MAT x10^3(ANC) 2019 1.54*

 IMM GRAN x10^3 2019 &lt;0.03

 LYMPH x10^3 2019 1.06*

 MONO x10^3 2019 0.44

 EOS x10^3 2019 0.23

 BASO x10^3 2019 0.04

 POCT GLU 2019 172*

 POCT GLU 2019 318*



Radiology

Ct Head Wo Contrast



Result Date: 2019

No acute intracranial findings. I, Pa Valdez MD., have reviewed 
this study and agree with the above report.



Assessment/Plan

Joe was seen today for a new patient visit and ER follow-up.  Will request the 
patient's outside medical records for my review.  The available medical records 
in Epic were reviewed.



Diagnoses and all orders for this visit:



Hepatic encephalopathy, Chronic hepatitis C without hepatic coma, Chronic 
abdominal pain, Hyperammonemia, History of alcoholism

I'm surprised he wasn't comatose with an ammonia level in the 300s while in the 
ED, but he says he is feeling better and is quite lucid now, so I didn't refer 
him back to the hospital.  Recommended evaluation and management by a GI/
Hepatology specialist ASAP, referral intiated.  Compliance with his medications 
including his lactulose was stressed.  Rx'ed an enema bag to facilitate regular 
stooling per the spouse's request.  Lactulose titration was reviewed again with 
them.  Strong ER precautions were given for confusion, severe lethargy, 
worsening abdominal pain, intractable vomiting, fever, etc.

-     CONSULT/REFERRAL GASTROENTEROLOGY

-     lactulose 10 gram/15 mL solution; Take 30 mL by mouth 3 (three) times 
daily as needed for Constipation or Encephalophathy.

-     ENEMA BAG Misc; Use as directed



Anxiety, History of substance abuse

Discussed the medication treatment options for the patient's anxiety disorder.  
I decided on a trialof citalopram over buspirone given buspirone shouldn't be 
used in cases of severe hepatic impairment.  The potential side effects of this 
drug class/medication were reviewed and the patient voiced understanding.  The 
patient refused referral for counseling or Psychiatry care at this time.  We 
discussed healthy ways of coping with anxiety and chronic illness.  Thyroid 
function testing is due, will getthis done next visit.  Close follow-up has 
been scheduled but the patient understands he can follow-up even sooner if his 
anxiety symptoms don't improve or if other mental health issues develop.  
Continued abstinence from EtOH and illicit drugs was also emphasized.

-     citalopram 20 mg tablet; Take 1 tablet by mouth daily.



Diabetes mellitus type 2 with complications, uncontrolled

Discontinue metformin given it shouldn't be used in cases of severe hepatic 
impairment.  Rx'ed low dose glimepiride and Lantus insulin instead.  Reviewed 
the principles of following a diabetic diet including the concept of glycemic 
index.  Exercise as tolerated.  Self monitor glucose once daily beforebreakfast 
and bring record to each visit.   See the Ophthalmologist at least annually for 
diabetic eye exam.  See the Podiatrist for routine foot care and diabetic shoes 
if appropriate.  Close follow-up.

-     Insulin Glargine (LANTUS SOLOSTAR U-100 INSULIN) 100 unit/mL (3 mL) 
injection; inject 15 Unitsunder the skin at bedtime.

-     Insulin Needles, Disposable, (PEN NEEDLE) 32 gauge x 5/32" Ndle; Use as 
directed daily to inject insulin

-     glimepiride 1 mg tablet; Take 1 tablet by mouth daily with breakfast



Plan of care, desired health behaviors, goals, Ddx, and any prescribed 
medications were discussed with the patient.  This visit did not involve 
counseling and coordination that comprised more than 50% of the visit time.   
Education resources and self-management tools were provided and reviewed with 
the AVS.  Patient/guardian/family verbalized understanding and agrees to the 
plan of care.  Barriers tocare:  Finances, transportation.  Ability to manage 
care:  Fair.  Advanced care planning (living will) information was not given/
offered to the patient to review for discussion at a future visit.  If 
applicable, the [facility] database was accessed to review any controlled 
substance prescription claimsdata.  If the patient is taking prescribed 
medications, the "[employer] prescription claims data inEpic was reviewed to 
assess patient compliance with the medication treatment plan.



Follow-up:  Return in about 3 weeks (around 2019) for diabetes and anxiety 
follow-up.  Follow-up sooner if any problems or concerns.



Scribe Attestation

Desi HILL , am scribing for, and in the presence of, Veronica Huff MD who performed the services described here-in.



Desi Rendon,

2019, 11:59 AM



Physician Attestation

LIZ, Veronica Huff MD, personally performed the services described in this 
documentation , as scribed by, Desi Rendon in my presence and it is both 
accurate and complete.



Veronica Huff MD

2019, 11:59 AM

Electronically signed by Veronica Huff MD at 2019 11:45 AM 
CDTdocumented in this encounter



Plan of Treatment







 Date  Type  Specialty  Care Team  Description

 

 2019  Office Visit  Family Medicine  Veronica Huff MD



  



       [address] [place] [address] [place] [id number] [phone] (Fax)  









 Health Maintenance  Due Date  Last Done  Comments

 

 EYE EXAM  1981    

 

 LDL-C  1981    

 

 URINE MICROALBUMIN  1981    

 

 FOOT EXAM  1989    

 

 DTaP,Tdap,and Td Vaccines (1 -  1990    



 Tdap)      

 

 INFLUENZA VACCINE  2019  

 

 HgA1C  2020  

 

 CREATININE (SERUM)  2020, 2019,  



     2019, Additional history  



     exists  

 

 PNEUMOCOCCAL 0-64 YEARS COMBINED  Completed  2015  



 SERIES      



documented as of this encounter



Results

Not on filedocumented in this encounter



Visit Diagnoses







 Diagnosis

 

 Diabetes mellitus type 2 with complications, uncontrolled - Primary







 Type II or unspecified type diabetes mellitus with unspecified complication,



 uncontrolled

 

 Hepatic encephalopathy

 

 Chronic hepatitis C without hepatic coma

 

 Chronic abdominal pain







 Abdominal pain, unspecified site

 

 Anxiety







 Anxiety state, unspecified

 

 Hyperammonemia







 Disorders of urea cycle metabolism

 

 History of alcoholism







 Personal history of alcoholism



documented in this encounter



Insurance







 Payer  Benefit Plan /  Subscriber ID  Effective Dates  Phone  Address  Type



   Group          

 

 [employer]  xxxxxxxxx  2019-Present  [phone]  [address] [place]        [id number] [place] [address]  



           [id number]  









 Guarantor Name  Account Type  Relation to  Date of Birth  Phone  Billing



     Patient      Address

 

 Joe Archuleta  Personal/Family  Self  1970  [phone]  [address]







         (Home)  [place]



           [id number]



documented as of this encounter

## 2019-09-03 PROCEDURE — 2W3RX1Z IMMOBILIZATION OF LEFT LOWER LEG USING SPLINT: ICD-10-PCS

## 2019-09-03 PROCEDURE — 30233R1 TRANSFUSION OF NONAUTOLOGOUS PLATELETS INTO PERIPHERAL VEIN, PERCUTANEOUS APPROACH: ICD-10-PCS

## 2019-09-03 PROCEDURE — 5A1935Z RESPIRATORY VENTILATION, LESS THAN 24 CONSECUTIVE HOURS: ICD-10-PCS

## 2019-09-03 PROCEDURE — 2W3QX1Z IMMOBILIZATION OF RIGHT LOWER LEG USING SPLINT: ICD-10-PCS

## 2019-09-03 PROCEDURE — 30233N1 TRANSFUSION OF NONAUTOLOGOUS RED BLOOD CELLS INTO PERIPHERAL VEIN, PERCUTANEOUS APPROACH: ICD-10-PCS

## 2019-09-03 PROCEDURE — 0BH17EZ INSERTION OF ENDOTRACHEAL AIRWAY INTO TRACHEA, VIA NATURAL OR ARTIFICIAL OPENING: ICD-10-PCS

## 2019-09-03 NOTE — RAD REPORT
EXAM DESCRIPTION:  RAD - Pelvis - 9/2/2019 9:42 pm

 

CLINICAL HISTORY:  Pelvic pain status post injury

 

FINDINGS:  Avulsion fracture involves the lesser trochanter of the right femur. The fracture extends 
into the proximal diaphysis. Marked displacement of fracture fragments present.

 

Comminuted fractures involving right superior and right inferior pubic rami.

 

Mildly displaced fractures involve the left inferior and superior pubic rami.

 

No dislocation noted

## 2019-09-03 NOTE — RAD REPORT
EXAM DESCRIPTION:  José Single View9/2/2019 9:42 pm

 

CLINICAL HISTORY:  Chest pain

 

COMPARISON:  2016

 

FINDINGS:  Endotracheal tube with its tip well above the elizabeth.

 

The lungs appear clear of acute infiltrate. Prominence of the pulmonary arteries is unchanged

 

Mildly displaced fracture involves the proximal right clavicle. Comminuted right scapular fracture

## 2019-09-03 NOTE — RAD REPORT
EXAM DESCRIPTION:  RAD - Femur Right - 9/2/2019 9:42 pm

 

CLINICAL HISTORY:  Right leg pain

 

FINDINGS:  Avulsion fracture involves the lesser trochanter of the right femur. The fracture extends 
into the proximal diaphysis. Marked displacement of fracture fragments present.

 

Comminuted fractures involving right superior and right inferior pubic rami.

 

Mildly displaced fractures involve the left inferior and superior pubic rami.

 

No dislocation noted

## 2019-09-03 NOTE — RAD REPORT
EXAM DESCRIPTION:  RAD - Wrist Right 3 View - 9/2/2019 9:43 pm

 

CLINICAL HISTORY:  Right wrist pain status post injury

 

FINDINGS:  Old fracture involves the fifth metacarpal neck.

 

No acute fracture or dislocation is seen.

## 2019-09-03 NOTE — RAD REPORT
EXAM DESCRIPTION:  Rebecca Mckenzie Left9/2/2019 9:42 pm

 

CLINICAL HISTORY:   Left leg pain status post injury

 

FINDINGS:   Segmental and comminuted fractures are present throughout the left tibia with moderate di
splacement of fracture fragments and angulation present at the fracture sites

 

Segmental comminuted fracture involves the mid left fibula. Mildly displaced spiral fracture involves
 the proximal left fibula.